# Patient Record
Sex: FEMALE | Race: WHITE | NOT HISPANIC OR LATINO | URBAN - NONMETROPOLITAN AREA
[De-identification: names, ages, dates, MRNs, and addresses within clinical notes are randomized per-mention and may not be internally consistent; named-entity substitution may affect disease eponyms.]

---

## 2019-03-14 ENCOUNTER — IMPORTED ENCOUNTER (OUTPATIENT)
Dept: URBAN - NONMETROPOLITAN AREA CLINIC 1 | Facility: CLINIC | Age: 64
End: 2019-03-14

## 2019-03-14 PROCEDURE — 92004 COMPRE OPH EXAM NEW PT 1/>: CPT

## 2019-03-14 PROCEDURE — 92015 DETERMINE REFRACTIVE STATE: CPT

## 2019-03-14 NOTE — PATIENT DISCUSSION
Compound Hyperopic Astigmatism OU w/Presbyopia-  discussed findings w/patient-  new spectacle Rx issued-  continue to monitor yearly or prnCataracts OU-  discussed findings w/patient-  no treatment indicated at this time-  UV protection recommended-  continue to monitor yearly or prnFuch's Corneal Dystrophy OU-  discussed findings w/patient-  start Rhett 128 gtts QID OU -  continue to monitorPapilloma x 2 OD-  discussed findings w/patient-  patient requests evaluation for removal at this time-  continue to monitor as per Alcus Manisha

## 2019-04-08 PROBLEM — H52.223: Noted: 2019-04-08

## 2019-04-08 PROBLEM — H02.821: Noted: 2019-04-08

## 2019-04-08 PROBLEM — H52.03: Noted: 2019-04-08

## 2019-04-08 PROBLEM — H02.822: Noted: 2019-04-08

## 2019-04-08 PROBLEM — H18.51: Noted: 2019-04-08

## 2019-04-08 PROBLEM — H52.4: Noted: 2019-04-08

## 2019-04-08 PROBLEM — H25.13: Noted: 2019-04-08

## 2019-04-09 ENCOUNTER — IMPORTED ENCOUNTER (OUTPATIENT)
Dept: URBAN - NONMETROPOLITAN AREA CLINIC 1 | Facility: CLINIC | Age: 64
End: 2019-04-09

## 2019-04-09 PROBLEM — H52.223: Noted: 2019-04-09

## 2019-04-09 PROBLEM — H25.13: Noted: 2019-04-09

## 2019-04-09 PROBLEM — H02.822: Noted: 2019-04-09

## 2019-04-09 PROBLEM — H52.4: Noted: 2019-04-09

## 2019-04-09 PROBLEM — H18.51: Noted: 2019-04-09

## 2019-04-09 PROBLEM — H02.821: Noted: 2019-04-09

## 2019-04-09 PROBLEM — H52.03: Noted: 2019-04-09

## 2019-04-09 PROCEDURE — 99213 OFFICE O/P EST LOW 20 MIN: CPT

## 2019-04-09 NOTE — PATIENT DISCUSSION
Fuch's Corneal Dystrophy OU-  discussed findings w/patient-  patient complains of his eyes aching since she started the drops and ointment will have the patient d/c drops and jessa for 2 weeks then recheck her Rx at next appointment -  d/c Rhett 128 gtts -  d/c Rhett 128 jessa -  start Systane Complete BID OU samples -  RTC 2 week f/u w/ Refraction Cataracts OU-  discussed findings w/patient-  no treatment indicated at this time-  UV protection recommended-  continue to monitor yearly or prnPapilloma x 2 OD-  discussed findings w/patient-  patient requests evaluation for removal at this time-  continue to monitor as per JSCompound Hyperopic Astigmatism OU w/Presbyopia-  discussed findings w/patient-  will recheck Rx at next appointment -  monitor 2 week f/u w/ Refraction

## 2019-04-23 ENCOUNTER — IMPORTED ENCOUNTER (OUTPATIENT)
Dept: URBAN - NONMETROPOLITAN AREA CLINIC 1 | Facility: CLINIC | Age: 64
End: 2019-04-23

## 2019-04-23 PROCEDURE — 99213 OFFICE O/P EST LOW 20 MIN: CPT

## 2019-04-23 NOTE — PATIENT DISCUSSION
Fuch's Corneal Dystrophy OU-  discussed findings w/patient-  patient has no pain-  d/c Rhett 128 gtts -  d/c Rhett 128 jessa -  continue Systane Complete BID OU samples -  start Freshkote tid-  RTC 2-3  week f/u FuchsCataracts OU-  discussed findings w/patient-  no treatment indicated at this time-  UV protection recommended-  continue to monitor yearly or prnPapilloma x 2 OD-  discussed findings w/patient-  patient requests evaluation for removal at this time-  continue to monitor as per Pita [facility]

## 2019-04-29 ENCOUNTER — IMPORTED ENCOUNTER (OUTPATIENT)
Dept: URBAN - NONMETROPOLITAN AREA CLINIC 1 | Facility: CLINIC | Age: 64
End: 2019-04-29

## 2019-04-29 PROBLEM — H52.4: Noted: 2019-04-29

## 2019-04-29 PROBLEM — H25.13: Noted: 2019-04-29

## 2019-04-29 PROBLEM — H52.223: Noted: 2019-04-29

## 2019-04-29 PROBLEM — H52.03: Noted: 2019-04-29

## 2019-04-29 PROBLEM — L82.0: Noted: 2019-04-29

## 2019-04-29 PROBLEM — H18.51: Noted: 2019-04-29

## 2019-04-29 PROCEDURE — 99212 OFFICE O/P EST SF 10 MIN: CPT

## 2019-04-29 NOTE — PATIENT DISCUSSION
Excision of Keratosis: -Excision of a lid keratosis  involves the surgical removal of part or all of a lesion. Risk include infection inflammation bleeding tightness around the suture and the need for more surgery. I have discussed the above procedure possible alternative methods of treatment and risk factors involved with the patient and/or family members. The patient wishes to proceed with excision of lid keratosis x 2 on right upper lid and 3rd keratosis on right lower lid margin. Continue to follow up w/ Dr. Sammi Mota for treatment of fuchs dystrophy.

## 2019-05-23 ENCOUNTER — IMPORTED ENCOUNTER (OUTPATIENT)
Dept: URBAN - NONMETROPOLITAN AREA CLINIC 1 | Facility: CLINIC | Age: 64
End: 2019-05-23

## 2019-05-23 PROBLEM — H52.4: Noted: 2019-05-23

## 2019-05-23 PROBLEM — H18.51: Noted: 2019-05-23

## 2019-05-23 PROBLEM — H52.223: Noted: 2019-05-23

## 2019-05-23 PROBLEM — H25.13: Noted: 2019-05-23

## 2019-05-23 PROBLEM — H52.03: Noted: 2019-05-23

## 2019-05-23 PROCEDURE — 92015 DETERMINE REFRACTIVE STATE: CPT

## 2019-05-23 PROCEDURE — 99213 OFFICE O/P EST LOW 20 MIN: CPT

## 2019-05-23 NOTE — PATIENT DISCUSSION
Fuch's Dystrophy-  discussed findings w/patient-  continue Fresh Kote QID OU-  continue Rhett 128 5% QHS OU-  monitor as scheduled or prnCompound Hyperopic Astigmatism OU w/Presbyopia-  discussed findings w/patient-  new spectacle Rx issued-  monitor yearly or prn

## 2019-07-22 ENCOUNTER — IMPORTED ENCOUNTER (OUTPATIENT)
Dept: URBAN - NONMETROPOLITAN AREA CLINIC 1 | Facility: CLINIC | Age: 64
End: 2019-07-22

## 2019-07-22 PROCEDURE — 67840 REMOVE EYELID LESION: CPT

## 2019-07-22 NOTE — PATIENT DISCUSSION
Keratosis x 1 RUL w/ second keratosis x 1 RLL. R & B's discussed . Pt elects to have procedure today. 1 week suture removal w/ Dr. Mando Hwang.

## 2019-07-23 PROBLEM — H52.223: Noted: 2019-07-23

## 2019-07-23 PROBLEM — H25.13: Noted: 2019-07-23

## 2019-07-23 PROBLEM — H52.4: Noted: 2019-07-23

## 2019-07-23 PROBLEM — H18.51: Noted: 2019-07-23

## 2019-07-23 PROBLEM — H52.03: Noted: 2019-07-23

## 2019-07-23 PROBLEM — L82.0: Noted: 2019-07-23

## 2019-07-30 ENCOUNTER — IMPORTED ENCOUNTER (OUTPATIENT)
Dept: URBAN - NONMETROPOLITAN AREA CLINIC 1 | Facility: CLINIC | Age: 64
End: 2019-07-30

## 2019-07-30 PROBLEM — H52.03: Noted: 2019-07-23

## 2019-07-30 PROBLEM — H52.223: Noted: 2019-07-23

## 2019-07-30 PROBLEM — H18.51: Noted: 2019-07-23

## 2019-07-30 PROBLEM — H52.4: Noted: 2019-07-23

## 2019-07-30 PROBLEM — H25.13: Noted: 2019-07-23

## 2019-07-30 PROBLEM — Z48.89: Noted: 2019-07-30

## 2019-07-30 PROCEDURE — 99024 POSTOP FOLLOW-UP VISIT: CPT

## 2019-07-30 NOTE — PATIENT DISCUSSION
s/p Keratosis x 1 RUL w/ second keratosis x 1 RLL-  discussed findings w/patient-  sutures not found on clincial exam-  d/c Erythromycin jessa-  continue Freshkote samples issued -  monitor in March for Routine

## 2021-03-22 LAB — MAMMOGRAPHY, EXTERNAL: NORMAL

## 2021-06-25 ENCOUNTER — APPOINTMENT (OUTPATIENT)
Dept: CT IMAGING | Age: 66
End: 2021-06-25
Attending: STUDENT IN AN ORGANIZED HEALTH CARE EDUCATION/TRAINING PROGRAM
Payer: COMMERCIAL

## 2021-06-25 ENCOUNTER — HOSPITAL ENCOUNTER (EMERGENCY)
Age: 66
Discharge: HOME | End: 2021-06-25
Attending: EMERGENCY MEDICINE
Payer: COMMERCIAL

## 2021-06-25 VITALS
RESPIRATION RATE: 16 BRPM | DIASTOLIC BLOOD PRESSURE: 83 MMHG | TEMPERATURE: 96.8 F | SYSTOLIC BLOOD PRESSURE: 174 MMHG | OXYGEN SATURATION: 96 % | WEIGHT: 230 LBS | BODY MASS INDEX: 34.07 KG/M2 | HEART RATE: 87 BPM | HEIGHT: 69 IN

## 2021-06-25 DIAGNOSIS — M54.16 LUMBAR RADICULOPATHY: Primary | ICD-10-CM

## 2021-06-25 LAB
ALBUMIN SERPL-MCNC: 3.9 G/DL (ref 3.5–5)
ALBUMIN/GLOB SERPL: 1.4 {RATIO} (ref 1.1–2.2)
ALP SERPL-CCNC: 125 U/L (ref 45–117)
ALT SERPL-CCNC: 20 U/L (ref 12–78)
ANION GAP SERPL CALC-SCNC: 5 MMOL/L (ref 5–15)
APPEARANCE UR: CLEAR
AST SERPL-CCNC: 10 U/L (ref 15–37)
BACTERIA URNS QL MICRO: NEGATIVE /HPF
BASOPHILS # BLD: 0 K/UL (ref 0–0.1)
BASOPHILS NFR BLD: 0 % (ref 0–1)
BILIRUB SERPL-MCNC: 0.3 MG/DL (ref 0.2–1)
BILIRUB UR QL: NEGATIVE
BUN SERPL-MCNC: 17 MG/DL (ref 6–20)
BUN/CREAT SERPL: 20 (ref 12–20)
CALCIUM SERPL-MCNC: 9.1 MG/DL (ref 8.5–10.1)
CHLORIDE SERPL-SCNC: 105 MMOL/L (ref 97–108)
CO2 SERPL-SCNC: 33 MMOL/L (ref 21–32)
COLOR UR: ABNORMAL
CREAT SERPL-MCNC: 0.85 MG/DL (ref 0.55–1.02)
DIFFERENTIAL METHOD BLD: NORMAL
EOSINOPHIL # BLD: 0.1 K/UL (ref 0–0.4)
EOSINOPHIL NFR BLD: 2 % (ref 0–7)
EPITH CASTS URNS QL MICRO: ABNORMAL /LPF
ERYTHROCYTE [DISTWIDTH] IN BLOOD BY AUTOMATED COUNT: 12.2 % (ref 11.5–14.5)
GLOBULIN SER CALC-MCNC: 2.8 G/DL (ref 2–4)
GLUCOSE SERPL-MCNC: 99 MG/DL (ref 65–100)
GLUCOSE UR STRIP.AUTO-MCNC: NEGATIVE MG/DL
HCT VFR BLD AUTO: 40.3 % (ref 35–47)
HGB BLD-MCNC: 13.8 G/DL (ref 11.5–16)
HGB UR QL STRIP: NEGATIVE
HYALINE CASTS URNS QL MICRO: ABNORMAL /LPF (ref 0–5)
IMM GRANULOCYTES # BLD AUTO: 0 K/UL (ref 0–0.04)
IMM GRANULOCYTES NFR BLD AUTO: 0 % (ref 0–0.5)
KETONES UR QL STRIP.AUTO: ABNORMAL MG/DL
LEUKOCYTE ESTERASE UR QL STRIP.AUTO: NEGATIVE
LYMPHOCYTES # BLD: 1.4 K/UL (ref 0.8–3.5)
LYMPHOCYTES NFR BLD: 25 % (ref 12–49)
MCH RBC QN AUTO: 32.5 PG (ref 26–34)
MCHC RBC AUTO-ENTMCNC: 34.2 G/DL (ref 30–36.5)
MCV RBC AUTO: 95 FL (ref 80–99)
MONOCYTES # BLD: 0.7 K/UL (ref 0–1)
MONOCYTES NFR BLD: 13 % (ref 5–13)
NEUTS SEG # BLD: 3.4 K/UL (ref 1.8–8)
NEUTS SEG NFR BLD: 60 % (ref 32–75)
NITRITE UR QL STRIP.AUTO: NEGATIVE
NRBC # BLD: 0 K/UL (ref 0–0.01)
NRBC BLD-RTO: 0 PER 100 WBC
PH UR STRIP: 5 [PH] (ref 5–8)
PLATELET # BLD AUTO: 206 K/UL (ref 150–400)
PMV BLD AUTO: 10 FL (ref 8.9–12.9)
POTASSIUM SERPL-SCNC: 3.2 MMOL/L (ref 3.5–5.1)
PROT SERPL-MCNC: 6.7 G/DL (ref 6.4–8.2)
PROT UR STRIP-MCNC: NEGATIVE MG/DL
RBC # BLD AUTO: 4.24 M/UL (ref 3.8–5.2)
RBC #/AREA URNS HPF: ABNORMAL /HPF (ref 0–5)
SODIUM SERPL-SCNC: 143 MMOL/L (ref 136–145)
SP GR UR REFRACTOMETRY: 1.03 (ref 1–1.03)
UR CULT HOLD, URHOLD: NORMAL
UROBILINOGEN UR QL STRIP.AUTO: 0.2 EU/DL (ref 0.2–1)
WBC # BLD AUTO: 5.7 K/UL (ref 3.6–11)
WBC URNS QL MICRO: ABNORMAL /HPF (ref 0–4)

## 2021-06-25 PROCEDURE — 80053 COMPREHEN METABOLIC PANEL: CPT

## 2021-06-25 PROCEDURE — 81001 URINALYSIS AUTO W/SCOPE: CPT

## 2021-06-25 PROCEDURE — 99283 EMERGENCY DEPT VISIT LOW MDM: CPT

## 2021-06-25 PROCEDURE — 99281 EMR DPT VST MAYX REQ PHY/QHP: CPT

## 2021-06-25 PROCEDURE — 74177 CT ABD & PELVIS W/CONTRAST: CPT

## 2021-06-25 PROCEDURE — 74011000636 HC RX REV CODE- 636: Performed by: RADIOLOGY

## 2021-06-25 PROCEDURE — 85025 COMPLETE CBC W/AUTO DIFF WBC: CPT

## 2021-06-25 PROCEDURE — 36415 COLL VENOUS BLD VENIPUNCTURE: CPT

## 2021-06-25 RX ORDER — HYDROCODONE BITARTRATE AND ACETAMINOPHEN 5; 325 MG/1; MG/1
1 TABLET ORAL
Qty: 6 TABLET | Refills: 0 | Status: SHIPPED | OUTPATIENT
Start: 2021-06-25 | End: 2021-06-28

## 2021-06-25 RX ORDER — PREDNISONE 20 MG/1
TABLET ORAL
Qty: 20 TABLET | Refills: 0 | Status: SHIPPED | OUTPATIENT
Start: 2021-06-25 | End: 2022-02-18 | Stop reason: ALTCHOICE

## 2021-06-25 RX ADMIN — IOPAMIDOL 100 ML: 755 INJECTION, SOLUTION INTRAVENOUS at 17:23

## 2021-06-25 NOTE — ED PROVIDER NOTES
67-year-old female presents with left back pain that radiates to her left leg. She has had numbness and tingling in her left toes. She was recently treated for UTI with Keflex. She has been on nitrofurantoin for suppression for the past 2 years. She denies any saddle anesthesia. She denies any fevers or chills. She does state that she is been having a hard time urinating. She denies any bowel incontinence. Flank Pain          No past medical history on file. No past surgical history on file. No family history on file. Social History     Socioeconomic History    Marital status:      Spouse name: Not on file    Number of children: Not on file    Years of education: Not on file    Highest education level: Not on file   Occupational History    Not on file   Tobacco Use    Smoking status: Not on file   Substance and Sexual Activity    Alcohol use: Not on file    Drug use: Not on file    Sexual activity: Not on file   Other Topics Concern    Not on file   Social History Narrative    Not on file     Social Determinants of Health     Financial Resource Strain:     Difficulty of Paying Living Expenses:    Food Insecurity:     Worried About Running Out of Food in the Last Year:     [address] in the Last Year:    Transportation Needs:     Lack of Transportation (Medical):  Lack of Transportation (Non-Medical):    Physical Activity:     Days of Exercise per Week:     Minutes of Exercise per Session:    Stress:     Feeling of Stress :    Social Connections:     Frequency of Communication with Friends and Family:     Frequency of Social Gatherings with Friends and Family:     Attends Buddhist Services:     Active Member of Clubs or Organizations:     Attends Club or Organization Meetings:     Marital Status:    Intimate Partner Violence:     Fear of Current or Ex-Partner:     Emotionally Abused:     Physically Abused:     Sexually Abused:           ALLERGIES: Patient has no known allergies. Review of Systems   Genitourinary: Positive for flank pain. All other systems reviewed and are negative. Vitals:    06/25/21 1512   BP: (!) 174/83   Pulse: 87   Resp: 16   Temp: 96.8 °F (36 °C)   SpO2: 96%   Weight: 104.3 kg (230 lb)   Height: 5' 9\" (1.753 m)            Physical Exam  Vitals and nursing note reviewed. Constitutional:       General: She is not in acute distress. HENT:      Head: Normocephalic and atraumatic. Mouth/Throat:      Mouth: Mucous membranes are moist.   Eyes:      General: No scleral icterus. Conjunctiva/sclera: Conjunctivae normal.      Pupils: Pupils are equal, round, and reactive to light. Neck:      Trachea: No tracheal deviation. Cardiovascular:      Rate and Rhythm: Normal rate and regular rhythm. Pulmonary:      Effort: Pulmonary effort is normal. No respiratory distress. Breath sounds: No wheezing or rales. Abdominal:      General: There is no distension. Palpations: Abdomen is soft. Tenderness: There is no abdominal tenderness. Genitourinary:     Comments: deferred  Musculoskeletal:         General: Tenderness (Left lumbar musculature) present. No deformity. Cervical back: Neck supple. Skin:     General: Skin is warm and dry. Neurological:      General: No focal deficit present. Mental Status: She is alert. Sensory: No sensory deficit. Motor: No weakness. Comments: Normal gait   Psychiatric:         Mood and Affect: Mood normal.          MDM  Number of Diagnoses or Management Options  Lumbar radiculopathy  Diagnosis management comments: Postvoid residual normal.  Patient likely suffering from herniated disc versus spinal stenosis. Severe disease on CT. Refer to Ortho. Procedures      7:47 PM  Patient re-evaluated. All questions answered. Patient appropriate for discharge. Given return precautions and follow up instructions.      LABORATORY TESTS:  Labs Reviewed METABOLIC PANEL, COMPREHENSIVE - Abnormal; Notable for the following components:       Result Value    Potassium 3.2 (*)     CO2 33 (*)     AST (SGOT) 10 (*)     Alk. phosphatase 125 (*)     All other components within normal limits   URINALYSIS W/MICROSCOPIC - Abnormal; Notable for the following components:    Ketone TRACE (*)     All other components within normal limits   URINE CULTURE HOLD SAMPLE   CBC WITH AUTOMATED DIFF   SAMPLES BEING HELD       IMAGING RESULTS:  CT ABD PELV W CONT   Final Result   No evidence for hydronephrosis or pyelonephritis or abscess   Incidental hepatic steatosis   Severe degenerative disc disease and osteoarthritis as described          MEDICATIONS GIVEN:  Medications   iopamidoL (ISOVUE-370) 76 % injection 100 mL (100 mL IntraVENous Given 6/25/21 8853)       IMPRESSION:  1. Lumbar radiculopathy        PLAN:  1. Current Discharge Medication List      START taking these medications    Details   predniSONE (DELTASONE) 20 mg tablet Take 3 tablets once daily for 3 days, Take 2 tablets once daily for 3 days, Take 1 tablets once daily for 3 days, Take 1/2 tablet once daily for 3 days  Qty: 20 Tablet, Refills: 0  Start date: 6/25/2021           2. Follow-up Information     Follow up With Specialties Details Why Contact Info    Thomas Upton MD Orthopedic Surgery Schedule an appointment as soon as possible for a visit   [id number] [facility] [phone]      [facility] EMERGENCY DEPT Emergency Medicine  If symptoms worsen or new concerns [address] [place] [address]  [phone]        3. Return to ED for new or worsening symptoms       Emerald Manzo MD

## 2021-06-25 NOTE — ED TRIAGE NOTES
Pt arrives with the c.c of left flank pain for the last couple weeks pt [facility] with [facility]i and started on abx treatment took seven day course; pt reports still having left side flank pain that radiates to grown and left leg.

## 2022-02-17 NOTE — PROGRESS NOTES
[address] [facility], [place] [address]   Office [phone], Fax [phone]    Subjective:     Chief Complaint   Patient presents with   [address]     Patient is coming in to establish care. Needs pre-op for hip surgery, left - March 22nd, 2022. Dr. Ted [facility]es at [facility] in [place], [address] [facility] (Fax: [phone]). Patient last PCP in [place], [place] last physical 2 years ago. History provided by patient     Brown Severance is a 73 y.o. female with PMHx epilepsy, recurrent UTIs, endometriosis, OA presents to establish care. Previous PCP was in [place] and last physical was about 2 years ago. Her only concerns today LUE edema for years (worsening recently) and facial diaphoresis for many years. Of note she is establishing care because she needs a pre-op visit in the future for L hip arthroplasty. She is also seeing a cardiologist for clearance because she was told she had an \"irregular EKG\" and a \"slight murmur\". They have ordered an echo, which will be done next week. Epilepsy  - Follows with Dr. Salena Catherine, neurologist at [place] [address] with lamictal 100mg tid, lyrica 75mg tid   - Has focal seizures about once per month  - Had laser ablation surgery to R hippocampus about 5 years ago- which made seizures worse but since following with Dr. Salena Catherine has made medication adjustments and overall is doing better     Recurrent UTIs  - On prophylactic macrobid       Medication reviewed. Current Outpatient Medications:     lamoTRIgine (LaMICtal) 100 mg tablet, TAKE 3 TABLETS BY MOUTH DAILY. FINAL DOSE: 1AM, 2PM, Disp: , Rfl:     pregabalin (LYRICA) 75 mg capsule, TAKE 3 CAPSULES BY MOUTH DAILY. 2 IN MORNING, 1 IN EVENING, Disp: , Rfl:     nitrofurantoin, macrocrystal-monohydrate, (MACROBID) 100 mg capsule, Take 100 mg by mouth daily. , Disp: , Rfl:     meloxicam (MOBIC) 15 mg tablet, Take 15 mg by mouth daily. , Disp: , Rfl:     hydroCHLOROthiazide (HYDRODIURIL) 25 mg tablet, , Disp: , Rfl:    aspirin (ASPIRIN) 325 mg tablet, Take 325 mg by mouth., Disp: , Rfl:      Allergy reviewed. No Known Allergies     Past Medical History:   Diagnosis Date    Arthritis     Seizure ([address]        Social History     Socioeconomic History    Marital status:    Tobacco Use    Smoking status: Former Smoker     Packs/day: 2.00     Years: 30.00     Pack years: 60.00     Quit date: 2012     Years since quitting: 10.1    Smokeless tobacco: Never Used   Vaping Use    Vaping Use: Never used   Substance and Sexual Activity    Alcohol use: Yes     Comment: occ    Drug use: Not Currently    Sexual activity: Yes     Review of Systems   Constitutional: Negative for chills and fever. HENT: Negative for congestion and sore throat. Respiratory: Negative for cough. Cardiovascular: Negative for chest pain, palpitations and leg swelling. Gastrointestinal: Negative for abdominal pain, nausea and vomiting. Genitourinary: Negative for dysuria, frequency and urgency. Skin: Negative for rash. Neurological: Negative for dizziness and headaches. Psychiatric/Behavioral: Negative for depression. The patient is not nervous/anxious. Objective:   Vitals - reviewed  Visit Vitals  /75 (BP 1 Location: Right arm, BP Patient Position: Sitting, BP Cuff Size: Large adult)   Pulse 77   Temp 98.5 °F (36.9 °C) (Oral)   Resp 20   Ht 5' 9\" (1.753 m)   Wt 218 lb (98.9 kg)   SpO2 95%   BMI 32.19 kg/m²        Physical exam:   GEN: NAD. Alert. Well nourished. EYES:  Conjunctiva clear  NECK:  Supple; no masses; thyroid normal           LUNGS: Respirations unlabored; CTAB. no wheeze, rales, rhonchi   CARDIOVASCULAR: Regular, rate, and rhythm without murmurs, gallops or rubs   NEUROLOGIC:  No focal neurologic deficits. Strength and sensation grossly intact. MSK: FROM in all extremities (both passive and active). Good tone. No vertebral tenderness. EXT: Well perfused. LUE edematous compared to RUE. No erythema. PSYCH: appropriate mood and affect. Good insight and judgement. Cooperative. SKIN: No obvious rashes or lesions. Assessment and orders:       ICD-10-CM ICD-9-CM    1. Encounter to establish care  Z76.89 V65.8    2. Edema of left upper extremity  R60.0 782.3 DUPLEX UPPER EXT VENOUS LEFT   3. Encounter for lipid screening for cardiovascular disease  Z13.220 V77.91 LIPID PANEL    Z13.6 V81.2 LIPID PANEL   4. Obesity (BMI 30-39. 9)  E66.9 278.00 HEMOGLOBIN A1C WITH EAG      METABOLIC PANEL, BASIC      REFERRAL TO DIETITIAN      METABOLIC PANEL, BASIC      HEMOGLOBIN A1C WITH EAG   5. Benign hypertension  I77 349.5 METABOLIC PANEL, BASIC      METABOLIC PANEL, BASIC   6. Diaphoresis  R61 780.8 TSH 3RD GENERATION      TSH 3RD GENERATION   7. Chronic fatigue  R53.82 780.79 CBC W/O DIFF      CBC W/O DIFF   8. EKG abnormality  R94.31 794.31    9. Seizures ([address]  R56.9 780.39        Encounter to establish care  - This provider to be patients PCP  - Release of records signed   - Labs as above  - Mychart set up    LUE edema  - Duplex    Diaphoresis: unclear etiology, had hysterectomy 30 years ago so unlikely hot flashes 2/2 menopausal state  - TSH    Seizures  - Follow up with neurology    EKG abnormality  - Follow up with cards      Follow up in 2 weeks for preop for L hip arthroplasty    Pt was discussed with Dr Deidre Aguiar (attending physician). I have reviewed patient medical and social history and medications. I have reviewed pertinent labs results and other data. I have discussed the diagnosis with the patient and the intended plan as seen in the above orders. The patient has received an after-visit summary and questions were answered concerning future plans. I have discussed medication side effects and warnings with the patient as well.     Annalee Montoya,   Resident [facility]  02/18/22

## 2022-02-18 ENCOUNTER — OFFICE VISIT (OUTPATIENT)
Dept: FAMILY MEDICINE CLINIC | Age: 67
End: 2022-02-18
Payer: COMMERCIAL

## 2022-02-18 VITALS
RESPIRATION RATE: 20 BRPM | DIASTOLIC BLOOD PRESSURE: 75 MMHG | HEART RATE: 77 BPM | TEMPERATURE: 98.5 F | HEIGHT: 69 IN | BODY MASS INDEX: 32.29 KG/M2 | OXYGEN SATURATION: 95 % | SYSTOLIC BLOOD PRESSURE: 111 MMHG | WEIGHT: 218 LBS

## 2022-02-18 DIAGNOSIS — R56.9 SEIZURES (HCC): ICD-10-CM

## 2022-02-18 DIAGNOSIS — R94.31 EKG ABNORMALITY: ICD-10-CM

## 2022-02-18 DIAGNOSIS — R61 DIAPHORESIS: ICD-10-CM

## 2022-02-18 DIAGNOSIS — Z13.6 ENCOUNTER FOR LIPID SCREENING FOR CARDIOVASCULAR DISEASE: ICD-10-CM

## 2022-02-18 DIAGNOSIS — R53.82 CHRONIC FATIGUE: ICD-10-CM

## 2022-02-18 DIAGNOSIS — Z76.89 ENCOUNTER TO ESTABLISH CARE: Primary | ICD-10-CM

## 2022-02-18 DIAGNOSIS — Z13.220 ENCOUNTER FOR LIPID SCREENING FOR CARDIOVASCULAR DISEASE: ICD-10-CM

## 2022-02-18 DIAGNOSIS — R60.0 EDEMA OF LEFT UPPER EXTREMITY: ICD-10-CM

## 2022-02-18 DIAGNOSIS — I10 BENIGN HYPERTENSION: ICD-10-CM

## 2022-02-18 DIAGNOSIS — E66.9 OBESITY (BMI 30-39.9): ICD-10-CM

## 2022-02-18 PROBLEM — G40.109 FOCAL EPILEPSY (HCC): Status: ACTIVE | Noted: 2021-08-10

## 2022-02-18 PROBLEM — N80.9 ENDOMETRIOSIS: Status: ACTIVE | Noted: 2022-02-18

## 2022-02-18 PROBLEM — E78.5 HYPERLIPIDEMIA: Status: ACTIVE | Noted: 2021-08-10

## 2022-02-18 LAB
ANION GAP SERPL CALC-SCNC: 6 MMOL/L (ref 5–15)
BUN SERPL-MCNC: 23 MG/DL (ref 6–20)
BUN/CREAT SERPL: 34 (ref 12–20)
CALCIUM SERPL-MCNC: 10.1 MG/DL (ref 8.5–10.1)
CHLORIDE SERPL-SCNC: 103 MMOL/L (ref 97–108)
CHOLEST SERPL-MCNC: 224 MG/DL
CO2 SERPL-SCNC: 31 MMOL/L (ref 21–32)
CREAT SERPL-MCNC: 0.67 MG/DL (ref 0.55–1.02)
ERYTHROCYTE [DISTWIDTH] IN BLOOD BY AUTOMATED COUNT: 12.7 % (ref 11.5–14.5)
EST. AVERAGE GLUCOSE BLD GHB EST-MCNC: 103 MG/DL
GLUCOSE SERPL-MCNC: 112 MG/DL (ref 65–100)
HBA1C MFR BLD: 5.2 % (ref 4–5.6)
HCT VFR BLD AUTO: 45 % (ref 35–47)
HDLC SERPL-MCNC: 61 MG/DL
HDLC SERPL: 3.7 {RATIO} (ref 0–5)
HGB BLD-MCNC: 15.1 G/DL (ref 11.5–16)
LDLC SERPL CALC-MCNC: 123.8 MG/DL (ref 0–100)
MCH RBC QN AUTO: 30.6 PG (ref 26–34)
MCHC RBC AUTO-ENTMCNC: 33.6 G/DL (ref 30–36.5)
MCV RBC AUTO: 91.3 FL (ref 80–99)
NRBC # BLD: 0 K/UL (ref 0–0.01)
NRBC BLD-RTO: 0 PER 100 WBC
PLATELET # BLD AUTO: 222 K/UL (ref 150–400)
PMV BLD AUTO: 10.5 FL (ref 8.9–12.9)
POTASSIUM SERPL-SCNC: 3.5 MMOL/L (ref 3.5–5.1)
RBC # BLD AUTO: 4.93 M/UL (ref 3.8–5.2)
SODIUM SERPL-SCNC: 140 MMOL/L (ref 136–145)
TRIGL SERPL-MCNC: 196 MG/DL (ref ?–150)
TSH SERPL DL<=0.05 MIU/L-ACNC: 1.16 UIU/ML (ref 0.36–3.74)
VLDLC SERPL CALC-MCNC: 39.2 MG/DL
WBC # BLD AUTO: 5.2 K/UL (ref 3.6–11)

## 2022-02-18 PROCEDURE — 99203 OFFICE O/P NEW LOW 30 MIN: CPT | Performed by: STUDENT IN AN ORGANIZED HEALTH CARE EDUCATION/TRAINING PROGRAM

## 2022-02-18 RX ORDER — MELOXICAM 15 MG/1
15 TABLET ORAL DAILY
COMMUNITY
Start: 2021-12-15

## 2022-02-18 RX ORDER — HYDROCHLOROTHIAZIDE 25 MG/1
50 TABLET ORAL 2 TIMES DAILY
COMMUNITY
Start: 2022-02-16

## 2022-02-18 RX ORDER — NITROFURANTOIN 25; 75 MG/1; MG/1
100 CAPSULE ORAL DAILY
COMMUNITY
Start: 2022-02-02

## 2022-02-18 RX ORDER — PREGABALIN 75 MG/1
CAPSULE ORAL
COMMUNITY
Start: 2022-01-06

## 2022-02-18 RX ORDER — LAMOTRIGINE 100 MG/1
TABLET ORAL
COMMUNITY
Start: 2022-02-01

## 2022-02-18 RX ORDER — ASPIRIN 325 MG
325 TABLET ORAL
COMMUNITY

## 2022-02-18 NOTE — PROGRESS NOTES
Identified Patient with two Patient identifiers (Name and ). Two Patient Identifiers confirmed. Reviewed record in preparation for visit and have obtained necessary documentation. Chief Complaint   Patient presents with   [address]     Patient is coming in to establish care. Needs pre-op for hip surgery, left - 2022. Dr. Michelle Parson at [facility] in [place], [address] [facility] (Fax: [phone]). Patient last PCP in [place], [place] last physical 2 years ago. Visit Vitals  /75 (BP 1 Location: Right arm, BP Patient Position: Sitting, BP Cuff Size: Large adult)   Pulse 77   Temp 98.5 °F (36.9 °C) (Oral)   Resp 20   Ht 5' 9\" (1.753 m)   Wt 218 lb (98.9 kg)   SpO2 95%   BMI 32.19 kg/m²       1. Have you been to the ER, urgent care clinic since your last visit? Hospitalized since your last visit? No NEW PATIENT    2. Have you seen or consulted any other health care providers outside of the [address] [place] [address] since your last visit? Include any pap smears or colon screening.  No NEW PATIENT

## 2022-02-18 NOTE — PATIENT INSTRUCTIONS
Learning About the [facility] Diet  What is the Mediterranean diet? The Mediterranean diet is a style of eating rather than a diet plan. It features foods eaten in [place], [place], [place] and [place], and other countries along the [facility]. It emphasizes eating foods like fish, fruits, vegetables, beans, high-fiber breads and whole grains, nuts, and olive oil. This style of eating includes limited red meat, cheese, and sweets. Why choose the Mediterranean diet? A Mediterranean-style diet may improve heart health. It contains more fat than other heart-healthy diets. But the fats are mainly from nuts, unsaturated oils (such as fish oils and olive oil), and certain nut or seed oils (such as canola, soybean, or flaxseed oil). These fats may help protect the heart and blood vessels. How can you get started on the Mediterranean diet? Here are some things you can do to switch to a more Mediterranean way of eating. What to eat  · Eat a variety of fruits and vegetables each day, such as grapes, blueberries, tomatoes, broccoli, peppers, figs, olives, spinach, eggplant, beans, lentils, and chickpeas. · Eat a variety of whole-grain foods each day, such as oats, brown rice, and whole wheat bread, pasta, and couscous. · Eat fish at least 2 times a week. Try tuna, salmon, mackerel, lake trout, herring, or sardines. · Eat moderate amounts of low-fat dairy products, such as milk, cheese, or yogurt. · Eat moderate amounts of poultry and eggs. · Choose healthy (unsaturated) fats, such as nuts, olive oil, and certain nut or seed oils like canola, soybean, and flaxseed. · Limit unhealthy (saturated) fats, such as butter, palm oil, and coconut oil. And limit fats found in animal products, such as meat and dairy products made with whole milk. Try to eat red meat only a few times a month in very small amounts. · Limit sweets and desserts to only a few times a week.  This includes sugar-sweetened drinks like soda. The Mediterranean diet may also include red wine with your meal1 glass each day for women and up to 2 glasses a day for men. Tips for eating at home  · Use herbs, spices, garlic, lemon zest, and citrus juice instead of salt to add flavor to foods. · Add avocado slices to your sandwich instead of clarke. · Have fish for lunch or dinner instead of red meat. Brush the fish with olive oil, and broil or grill it. · Sprinkle your salad with seeds or nuts instead of cheese. · Cook with olive or canola oil instead of butter or oils that are high in saturated fat. · Switch from 2% milk or whole milk to 1% or fat-free milk. · Dip raw vegetables in a vinaigrette dressing or hummus instead of dips made from mayonnaise or sour cream.  · Have a piece of fruit for dessert instead of a piece of cake. Try baked apples, or have some dried fruit. Tips for eating out  · Try broiled, grilled, baked, or poached fish instead of having it fried or breaded. · Ask your  to have your meals prepared with olive oil instead of butter. · Order dishes made with marinara sauce or sauces made from olive oil. Avoid sauces made from cream or mayonnaise. · Choose whole-grain breads, whole wheat pasta and pizza crust, brown rice, beans, and lentils. · Cut back on butter or margarine on bread. Instead, you can dip your bread in a small amount of olive oil. · Ask for a side salad or grilled vegetables instead of french fries or chips. Where can you learn more? Go to http://www.kiser.com/  Enter O407 in the search box to learn more about \"Learning About the Mediterranean Diet. \"  Current as of: December 17, 2020               Content Version: 13.0  © 0271-1886 Healthwise[employer]. Care instructions adapted under license by [employer] (which disclaims liability or warranty for this information).  If you have questions about a medical condition or this instruction, always ask your healthcare professional. [address] any warranty or liability for your use of this information.

## 2022-02-21 NOTE — PROGRESS NOTES
CBC wnl  BMP with mildly elevated glucose of 112 but A1C wnl  Lipid panel abn - ASCVD 5.7% with . Work on lifestyle modifications. TSH nl.

## 2022-02-25 ENCOUNTER — HOSPITAL ENCOUNTER (OUTPATIENT)
Dept: VASCULAR SURGERY | Age: 67
Discharge: HOME | End: 2022-02-25
Attending: STUDENT IN AN ORGANIZED HEALTH CARE EDUCATION/TRAINING PROGRAM
Payer: COMMERCIAL

## 2022-02-25 DIAGNOSIS — R60.0 EDEMA OF LEFT UPPER EXTREMITY: ICD-10-CM

## 2022-02-25 PROCEDURE — 93971 EXTREMITY STUDY: CPT

## 2022-03-01 ENCOUNTER — DOCUMENTATION ONLY (OUTPATIENT)
Dept: FAMILY MEDICINE CLINIC | Age: 67
End: 2022-03-01

## 2022-03-01 NOTE — PROGRESS NOTES
Received records from cardiologist, Dr. Sumaya Neff. He noted 2/6 ERIN L upper sternal border and ordered an echo. EKG was also attached to the note that was faxed over. EKG NSR, LAFB, LVH, poor r wave progression, no acute ST changes. Full note placed in scan pile.      Tri Reyes, [address] [place] [address] Resident

## 2022-03-09 ENCOUNTER — OFFICE VISIT (OUTPATIENT)
Dept: FAMILY MEDICINE CLINIC | Age: 67
End: 2022-03-09
Payer: COMMERCIAL

## 2022-03-09 VITALS
HEART RATE: 79 BPM | WEIGHT: 220 LBS | BODY MASS INDEX: 32.58 KG/M2 | SYSTOLIC BLOOD PRESSURE: 138 MMHG | RESPIRATION RATE: 16 BRPM | OXYGEN SATURATION: 96 % | HEIGHT: 69 IN | DIASTOLIC BLOOD PRESSURE: 68 MMHG

## 2022-03-09 DIAGNOSIS — M16.12 ARTHRITIS OF LEFT HIP: Primary | ICD-10-CM

## 2022-03-09 DIAGNOSIS — Z01.818 PRE-OP EXAM: ICD-10-CM

## 2022-03-09 DIAGNOSIS — E78.5 HYPERLIPIDEMIA, UNSPECIFIED HYPERLIPIDEMIA TYPE: ICD-10-CM

## 2022-03-09 PROBLEM — Z86.73 HISTORY OF TIAS: Status: ACTIVE | Noted: 2022-03-09

## 2022-03-09 PROCEDURE — 1090F PRES/ABSN URINE INCON ASSESS: CPT | Performed by: STUDENT IN AN ORGANIZED HEALTH CARE EDUCATION/TRAINING PROGRAM

## 2022-03-09 PROCEDURE — G8536 NO DOC ELDER MAL SCRN: HCPCS | Performed by: STUDENT IN AN ORGANIZED HEALTH CARE EDUCATION/TRAINING PROGRAM

## 2022-03-09 PROCEDURE — G9899 SCRN MAM PERF RSLTS DOC: HCPCS | Performed by: STUDENT IN AN ORGANIZED HEALTH CARE EDUCATION/TRAINING PROGRAM

## 2022-03-09 PROCEDURE — 99213 OFFICE O/P EST LOW 20 MIN: CPT | Performed by: STUDENT IN AN ORGANIZED HEALTH CARE EDUCATION/TRAINING PROGRAM

## 2022-03-09 PROCEDURE — G8432 DEP SCR NOT DOC, RNG: HCPCS | Performed by: STUDENT IN AN ORGANIZED HEALTH CARE EDUCATION/TRAINING PROGRAM

## 2022-03-09 PROCEDURE — 1101F PT FALLS ASSESS-DOCD LE1/YR: CPT | Performed by: STUDENT IN AN ORGANIZED HEALTH CARE EDUCATION/TRAINING PROGRAM

## 2022-03-09 PROCEDURE — G8427 DOCREV CUR MEDS BY ELIG CLIN: HCPCS | Performed by: STUDENT IN AN ORGANIZED HEALTH CARE EDUCATION/TRAINING PROGRAM

## 2022-03-09 PROCEDURE — G8754 DIAS BP LESS 90: HCPCS | Performed by: STUDENT IN AN ORGANIZED HEALTH CARE EDUCATION/TRAINING PROGRAM

## 2022-03-09 PROCEDURE — 3017F COLORECTAL CA SCREEN DOC REV: CPT | Performed by: STUDENT IN AN ORGANIZED HEALTH CARE EDUCATION/TRAINING PROGRAM

## 2022-03-09 PROCEDURE — G8752 SYS BP LESS 140: HCPCS | Performed by: STUDENT IN AN ORGANIZED HEALTH CARE EDUCATION/TRAINING PROGRAM

## 2022-03-09 PROCEDURE — G8400 PT W/DXA NO RESULTS DOC: HCPCS | Performed by: STUDENT IN AN ORGANIZED HEALTH CARE EDUCATION/TRAINING PROGRAM

## 2022-03-09 PROCEDURE — G8417 CALC BMI ABV UP PARAM F/U: HCPCS | Performed by: STUDENT IN AN ORGANIZED HEALTH CARE EDUCATION/TRAINING PROGRAM

## 2022-03-09 RX ORDER — ATORVASTATIN CALCIUM 40 MG/1
40 TABLET, FILM COATED ORAL DAILY
Qty: 30 TABLET | Refills: 2 | Status: SHIPPED | OUTPATIENT
Start: 2022-03-09 | End: 2022-06-07

## 2022-03-09 RX ORDER — ESTRADIOL 0.1 MG/G
CREAM VAGINAL
COMMUNITY
Start: 2021-09-01

## 2022-03-09 NOTE — PROGRESS NOTES
Lisa Jerome is an 73 y.o. female who presents for preoperative clearance. Planned procedure: L Hip replacement  Surgeon: Dr. Juan Carlos Rucker     History of injury: no injury- indication for surgery is arthritis     Risk Assessment using Revised Mera cardiac risk index (RCRI):  High-risk type of surgery (examples include vascular surgery and any open intraperitoneal or intrathoracic procedures): none  History of ischemic heart disease (i.e. MI or a positive exercise test, current complaint of chest pain, use of nitrate therapy, or ECG with pathological Q waves): none (of note- was seen by cardiology also and cleared for surgery)  History of HF: none   History of cerebrovascular disease: previously has had TIAs  Diabetes mellitus requiring treatment with insulin: none  Preoperative serum creatinine >2.0 mg/dL (177 µmol/L): none    Assessment of Cardiac Functional Status:   Can take care of self, such as eat, dress, or use the toilet (1 MET): yes  Can walk up a flight of steps or a hill or walk on level ground at 3 to 4 mph (4 METs): yes with difficulty   Can do heavy work around the house such as scrubbing floors or lifting or moving heavy furniture or climb two flights of stairs (between 4 and 10 METs): no  Can participate in strenuous sports such as swimming, singles tennis, football, basketball, and skiing (>10 METs): no    Allergies - reviewed:   No Known Allergies      Medications - reviewed:   Current Outpatient Medications   Medication Sig    mv,Ca,min-folic acid-vit K1 438-31 mcg tab Take 1 Tablet by mouth daily.  estradioL (ESTRACE) 0.01 % (0.1 mg/gram) vaginal cream APPLY A SMALL AMOUNT INTO VAGINA TWICE A WEEK    atorvastatin (LIPITOR) 40 mg tablet Take 1 Tablet by mouth daily.  lamoTRIgine (LaMICtal) 100 mg tablet TAKE 3 TABLETS BY MOUTH DAILY. FINAL DOSE: 1AM, 2PM    pregabalin (LYRICA) 75 mg capsule TAKE 3 CAPSULES BY MOUTH DAILY.  2 IN MORNING, 1 IN EVENING    nitrofurantoin, macrocrystal-monohydrate, (MACROBID) 100 mg capsule Take 100 mg by mouth daily.  meloxicam (MOBIC) 15 mg tablet Take 15 mg by mouth daily.  hydroCHLOROthiazide (HYDRODIURIL) 25 mg tablet     aspirin (ASPIRIN) 325 mg tablet Take 325 mg by mouth. No current facility-administered medications for this visit. Past Medical History - reviewed:  Past Medical History:   Diagnosis Date    Arthritis     Seizure ([facility] [address]      Past Surgical History - reviewed:   Past Surgical History:   Procedure Laterality Date    HX CHOLECYSTECTOMY      HX DENTAL TRANSPLANT      HX HYSTERECTOMY      HX ORTHOPAEDIC      knee left and right     Social History - reviewed:  Social History     Socioeconomic History    Marital status:      Spouse name: Not on file    Number of children: Not on file    Years of education: Not on file    Highest education level: Not on file   Occupational History    Not on file   Tobacco Use    Smoking status: Former Smoker     Packs/day: 2.00     Years: 30.00     Pack years: 60.00     Quit date: 2012     Years since quitting: 10.1    Smokeless tobacco: Never Used   Vaping Use    Vaping Use: Never used   Substance and Sexual Activity    Alcohol use: Yes     Comment: occ    Drug use: Not Currently    Sexual activity: Yes   Other Topics Concern    Not on file   Social History Narrative    Not on file     Social Determinants of Health     Financial Resource Strain:     Difficulty of Paying Living Expenses: Not on file   Food Insecurity:     Worried About [address] in the Last Year: Not on file    [address] in the Last Year: Not on file   Transportation Needs:     Lack of Transportation (Medical): Not on file    Lack of Transportation (Non-Medical):  Not on file   Physical Activity:     Days of Exercise per Week: Not on file    Minutes of Exercise per Session: Not on file   Stress:     Feeling of Stress : Not on file   Social Connections:     Frequency of Communication with Friends and Family: Not on file    Frequency of Social Gatherings with Friends and Family: Not on file    Attends Denominational Services: Not on file    Active Member of Clubs or Organizations: Not on file    Attends Club or Organization Meetings: Not on file    Marital Status: Not on file   Intimate Partner Violence:     Fear of Current or Ex-Partner: Not on file    Emotionally Abused: Not on file    Physically Abused: Not on file    Sexually Abused: Not on file   Housing Stability:     Unable to Pay for Housing in the Last Year: Not on file    Number of [place] in the Last Year: Not on file    Unstable Housing in the Last Year: Not on file     Family History - reviewed:  Family History   Problem Relation Age of Onset    Cancer Mother     Heart Disease Mother     Cancer Father      Immunizations - reviewed:   Immunization History   Administered Date(s) Administered    COVID-19, Rashaad Avitia, Primary or Immunocompromised Series, MRNA, PF, 100mcg/0.5mL 01/25/2022    COVID-19, Pfizer Purple top, DILUTE for use, 12+ yrs, 30mcg/0.3mL dose 04/29/2021, 05/20/2021     ROS  Review of Systems   Constitutional: Negative for chills and fever. HENT: Negative for congestion and sore throat. Respiratory: Negative for cough and shortness of breath. Cardiovascular: Negative for chest pain and palpitations. Gastrointestinal: Negative for abdominal pain, constipation, diarrhea, nausea and vomiting. Genitourinary: Negative for dysuria and urgency. Skin: Negative for rash. Neurological: Negative for dizziness and headaches. Physical Exam  Visit Vitals  /68 (BP 1 Location: Left arm, BP Patient Position: Sitting, BP Cuff Size: Adult long)   Pulse 79   Resp 16   Ht 5' 9\" (1.753 m)   Wt 220 lb (99.8 kg)   SpO2 96%   BMI 32.49 kg/m²       Physical Exam  Constitutional:       Appearance: Normal appearance. HENT:      Head: Normocephalic and atraumatic.       Mouth/Throat: Mouth: Mucous membranes are moist.      Pharynx: No oropharyngeal exudate. Eyes:      General: No scleral icterus. Conjunctiva/sclera: Conjunctivae normal.   Cardiovascular:      Rate and Rhythm: Normal rate and regular rhythm. Heart sounds: Murmur heard. Pulmonary:      Effort: Pulmonary effort is normal.      Breath sounds: Normal breath sounds. No wheezing, rhonchi or rales. Abdominal:      General: Abdomen is flat. There is no distension. Palpations: Abdomen is soft. Tenderness: There is no abdominal tenderness. There is no guarding. Skin:     General: Skin is warm and dry. Neurological:      General: No focal deficit present. Mental Status: She is alert and oriented to person, place, and time. Cranial Nerves: No cranial nerve deficit. Psychiatric:         Mood and Affect: Mood normal.         Behavior: Behavior normal.       Assessment/Plan    ICD-10-CM ICD-9-CM    1. Arthritis of left hip  M16.12 716.95    2. Pre-op exam  Z01.818 V72.84 CBC W/O DIFF      METABOLIC PANEL, BASIC   3. Hyperlipidemia, unspecified hyperlipidemia type  E78.5 272.4 atorvastatin (LIPITOR) 40 mg tablet       Miguel Dnucan is scheduled to have noncardiac surgery and has been evaluated for the risk of a cardiovascular perioperative cardiac event. female is cleared for surgery and the estimated risk of an adverse cardiac event using the Revised Mera Cardiac Risk Index (RCRI) is 6%. She has also been seen by cardiology recently due to an abnormal EKG. She has followed up with them and been cleared for surgery by them as well. They told her the EKG was not worrisome, they also did an echocardiogram for a slight murmur that they heard but she was told the results were normal. We will check a CBC, BMP today per request of preop forms and will send results with this note once they are back. We did also discuss her HLD and ASCVD risk of 8.8%.  She has previously been on a statin but was stopped due to her cholesterol improving in the past. She is agreeable to restart lipitor today. We will recheck lipid panel in 2-3 months. I have discussed the diagnosis with the patient and the intended plan as seen in the above orders. The patient has received an after-visit summary and questions were answered concerning future plans. I have discussed medication side effects and warnings with the patient as well.       Patrick Clifton, DO

## 2022-03-09 NOTE — PROGRESS NOTES
Chief Complaint   Patient presents with    Pre-op Exam     L hip replacement scheduled for 3/22/22.      Visit Vitals  /68 (BP 1 Location: Left arm, BP Patient Position: Sitting, BP Cuff Size: Adult long)   Pulse 79   Resp 16   Ht 5' 9\" (1.753 m)   Wt 220 lb (99.8 kg)   SpO2 96%   BMI 32.49 kg/m²

## 2022-03-10 LAB
ANION GAP SERPL CALC-SCNC: 3 MMOL/L (ref 5–15)
BUN SERPL-MCNC: 21 MG/DL (ref 6–20)
BUN/CREAT SERPL: 29 (ref 12–20)
CALCIUM SERPL-MCNC: 9.9 MG/DL (ref 8.5–10.1)
CHLORIDE SERPL-SCNC: 101 MMOL/L (ref 97–108)
CO2 SERPL-SCNC: 36 MMOL/L (ref 21–32)
CREAT SERPL-MCNC: 0.72 MG/DL (ref 0.55–1.02)
ERYTHROCYTE [DISTWIDTH] IN BLOOD BY AUTOMATED COUNT: 12.5 % (ref 11.5–14.5)
GLUCOSE SERPL-MCNC: 106 MG/DL (ref 65–100)
HCT VFR BLD AUTO: 44.5 % (ref 35–47)
HGB BLD-MCNC: 14.6 G/DL (ref 11.5–16)
MCH RBC QN AUTO: 30.5 PG (ref 26–34)
MCHC RBC AUTO-ENTMCNC: 32.8 G/DL (ref 30–36.5)
MCV RBC AUTO: 93.1 FL (ref 80–99)
NRBC # BLD: 0 K/UL (ref 0–0.01)
NRBC BLD-RTO: 0 PER 100 WBC
PLATELET # BLD AUTO: 221 K/UL (ref 150–400)
PMV BLD AUTO: 10.8 FL (ref 8.9–12.9)
POTASSIUM SERPL-SCNC: 3.6 MMOL/L (ref 3.5–5.1)
RBC # BLD AUTO: 4.78 M/UL (ref 3.8–5.2)
SODIUM SERPL-SCNC: 140 MMOL/L (ref 136–145)
WBC # BLD AUTO: 5 K/UL (ref 3.6–11)

## 2022-03-18 PROBLEM — I10 BENIGN HYPERTENSION: Status: ACTIVE | Noted: 2021-08-10

## 2022-03-18 PROBLEM — Z86.73 HISTORY OF TIAS: Status: ACTIVE | Noted: 2022-03-09

## 2022-03-19 PROBLEM — G40.109 FOCAL EPILEPSY (HCC): Status: ACTIVE | Noted: 2021-08-10

## 2022-03-20 PROBLEM — N80.9 ENDOMETRIOSIS: Status: ACTIVE | Noted: 2022-02-18

## 2022-03-20 PROBLEM — E78.5 HYPERLIPIDEMIA: Status: ACTIVE | Noted: 2021-08-10

## 2022-04-10 ASSESSMENT — VISUAL ACUITY
OD_SC: 20/30
OD_SC: 20/30-2
OD_SC: 20/40
OU_SC: 20/25-2
OS_SC: 20/20
OS_SC: 20/40+
OD_PH: 20/25-2
OU_SC: 20/30-2
OS_SC: 20/50-
OD_SC: 20/40-
OS_SC: 20/40
OD_SC: 20/30-1
OS_SC: 20/30-1
OU_CC: 20/30+
OS_SC: 20/30-1
OS_SC: 20/25-2
OS_GLARE: 20/400
OD_SC: 20/40-1

## 2022-04-10 ASSESSMENT — TONOMETRY
OS_IOP_MMHG: 14
OD_IOP_MMHG: 16
OS_IOP_MMHG: 14
OD_IOP_MMHG: 16
OS_IOP_MMHG: 15
OD_IOP_MMHG: 14
OS_IOP_MMHG: 12
OD_IOP_MMHG: 12

## 2022-04-21 ENCOUNTER — OFFICE VISIT (OUTPATIENT)
Dept: FAMILY MEDICINE CLINIC | Age: 67
End: 2022-04-21
Payer: COMMERCIAL

## 2022-04-21 VITALS
BODY MASS INDEX: 32.94 KG/M2 | WEIGHT: 222.4 LBS | RESPIRATION RATE: 16 BRPM | HEIGHT: 69 IN | HEART RATE: 77 BPM | DIASTOLIC BLOOD PRESSURE: 76 MMHG | SYSTOLIC BLOOD PRESSURE: 118 MMHG | OXYGEN SATURATION: 95 %

## 2022-04-21 DIAGNOSIS — Z12.31 ENCOUNTER FOR SCREENING MAMMOGRAM FOR MALIGNANT NEOPLASM OF BREAST: ICD-10-CM

## 2022-04-21 DIAGNOSIS — N95.1 HOT FLASHES DUE TO MENOPAUSE: ICD-10-CM

## 2022-04-21 DIAGNOSIS — M79.89 LEFT UPPER EXTREMITY SWELLING: Primary | ICD-10-CM

## 2022-04-21 PROBLEM — F33.0 MAJOR DEPRESSIVE DISORDER, RECURRENT, MILD (HCC): Status: ACTIVE | Noted: 2022-04-21

## 2022-04-21 PROCEDURE — 99214 OFFICE O/P EST MOD 30 MIN: CPT | Performed by: STUDENT IN AN ORGANIZED HEALTH CARE EDUCATION/TRAINING PROGRAM

## 2022-04-21 RX ORDER — VENLAFAXINE HYDROCHLORIDE 37.5 MG/1
37.5 CAPSULE, EXTENDED RELEASE ORAL DAILY
Qty: 30 CAPSULE | Refills: 1 | Status: SHIPPED | OUTPATIENT
Start: 2022-04-21 | End: 2022-06-15

## 2022-04-21 NOTE — PROGRESS NOTES
[address] [facility] [address] [place] [address]   Office [phone], Fax [phone]    Subjective:     Chief Complaint   Patient presents with    Swelling     History provided by patient     Griffin Grajeda is a 73 y.o. female presents for LUE edema and hot flashes. LUE edema  - Present for years but worsening over the last few months. No injury or previous surgeries to that shoulder or chest wall   - Had duplex that was negative for DVT previously  - No erythema or skin changes  - Last mammogram just over 1 year ago and normal per patient     Hot flashes  - Started at 62, worse in the past 2 years  - LMP in her 35s after ?partial hysterectomy. She thinks her ovaries may have been left behind but she is not 100% sure  - TSH recently wnl      Medication reviewed. Current Outpatient Medications:     venlafaxine-SR (EFFEXOR-XR) 37.5 mg capsule, Take 1 Capsule by mouth daily. , Disp: 30 Capsule, Rfl: 1    mv,Ca,min-folic acid-vit K1 860-95 mcg tab, Take 1 Tablet by mouth daily. , Disp: , Rfl:     atorvastatin (LIPITOR) 40 mg tablet, Take 1 Tablet by mouth daily. , Disp: 30 Tablet, Rfl: 2    lamoTRIgine (LaMICtal) 100 mg tablet, TAKE 3 TABLETS BY MOUTH DAILY. FINAL DOSE: 1AM, 2PM, Disp: , Rfl:     pregabalin (LYRICA) 75 mg capsule, TAKE 3 CAPSULES BY MOUTH DAILY. 2 IN MORNING, 1 IN EVENING, Disp: , Rfl:     nitrofurantoin, macrocrystal-monohydrate, (MACROBID) 100 mg capsule, Take 100 mg by mouth daily. , Disp: , Rfl:     meloxicam (MOBIC) 15 mg tablet, Take 15 mg by mouth daily. , Disp: , Rfl:     hydroCHLOROthiazide (HYDRODIURIL) 25 mg tablet, Take 50 mg by mouth two (2) times a day., Disp: , Rfl:     aspirin (ASPIRIN) 325 mg tablet, Take 325 mg by mouth., Disp: , Rfl:     estradioL (ESTRACE) 0.01 % (0.1 mg/gram) vaginal cream, APPLY A SMALL AMOUNT INTO VAGINA TWICE A WEEK, Disp: , Rfl:      Allergy reviewed.   No Known Allergies     Past Medical History:   Diagnosis Date    Arthritis     Seizure ([facility] [address] Social History     Socioeconomic History    Marital status:    Tobacco Use    Smoking status: Former Smoker     Packs/day: 2.00     Years: 30.00     Pack years: 60.00     Quit date: 2012     Years since quitting: 10.3    Smokeless tobacco: Never Used   Vaping Use    Vaping Use: Never used   Substance and Sexual Activity    Alcohol use: Yes     Comment: occ    Drug use: Not Currently    Sexual activity: Yes       Review of Systems   Constitutional: Negative for chills and fever.        + hot flashes   HENT: Negative for congestion and sore throat. Respiratory: Negative for cough and shortness of breath. Cardiovascular: Negative for chest pain, palpitations and leg swelling. Gastrointestinal: Negative for abdominal pain, diarrhea, nausea and vomiting. Musculoskeletal: Negative for myalgias. Skin: Negative for rash. Neurological: Negative for dizziness and headaches. Objective:   Vitals - reviewed  Visit Vitals  /76 (BP 1 Location: Right arm, BP Patient Position: Sitting)   Pulse 77   Resp 16   Ht 5' 9\" (1.753 m)   Wt 222 lb 6.4 oz (100.9 kg)   SpO2 95%   BMI 32.84 kg/m²        Physical exam:   GEN: NAD. Alert. Well nourished. EYES:  Conjunctiva clear  NECK:  Supple; no masses; thyroid normal           LUNGS: Respirations unlabored; CTAB. no wheeze, rales, rhonchi   CARDIOVASCULAR: Regular, rate, and rhythm. + 2/6 murmur JOSE border  ABDOMEN: Soft; NT, ND. +bowel sounds; no rebound tenderness, no guarding. no masses or organomegaly  NEUROLOGIC:  No focal neurologic deficits. Strength and sensation grossly intact. MSK: FROM in all extremities (both passive and active). Good tone. + LUE non-pitting edema  EXT: Well perfused. No edema. No erythema. PSYCH: appropriate mood and affect. Good insight and judgement. Cooperative. SKIN: No obvious rashes or lesions. Assessment and orders:       ICD-10-CM ICD-9-CM    1.  Left upper extremity swelling  M79.89 729.81 REFERRAL TO LYMPHEDEMA CLINIC   2. Hot flashes due to menopause  N95.1 627.2 venlafaxine-SR (EFFEXOR-XR) 37.5 mg capsule   3. Encounter for screening mammogram for malignant neoplasm of breast  Z12.31 V76.12 YUMIKO 3D CHUY W MAMMO BI SCREENING INCL CAD     LUE edema: Likely 2/2 lymphedema   - Compression sleeve to LUE   - Referral to lymphedema clinic  - Mammogram to rule out breast cancer as etiology. Unlikely as patient had normal mammo a little over 1 year ago     Hot flashes: TSH recently wnl, possibly 2/2 menopausal symptoms  - Effexor 37.5mg daily      Follow up in 4 weeks    Pt was discussed and seen with Dr Thao Singh (attending physician). I have reviewed patient medical and social history and medications. I have reviewed pertinent labs results and other data. I have discussed the diagnosis with the patient and the intended plan as seen in the above orders. The patient has received an after-visit summary and questions were answered concerning future plans. I have discussed medication side effects and warnings with the patient as well.     Bev Castellon,   Resident [facility]  04/21/22

## 2022-04-21 NOTE — PATIENT INSTRUCTIONS
Lymphedema: Care Instructions  Your Care Instructions     Lymphedema is fluid that builds up in the arms or legs. It is often caused by surgery to remove lymph nodes during cancer treatment, especially breast cancer surgery, which can cause fluid to build up in the arm. It can happen after radiation treatment to an area that involves lymph nodes. It also can be caused by a fractured bone or surgery to fix a fracture. And some medicines also can cause lymphedema. Some people get it for unknown reasons. Normally, lymph nodes trap bacteria and other substances as fluid flows through them. Then, the white cells in the body's defense, or immune, system can destroy the substances. But if there are few or no lymph nodesor if the lymph system in an arm or leg has been damagedfluid can build up in the affected arm or leg. You can take simple steps at home to help treat or prevent fluid buildup. Treatment may include raising the arm or leg to let gravity drain the fluid. You also can wear compression stockings or sleeves. Follow-up care is a key part of your treatment and safety. Be sure to make and go to all appointments, and call your doctor if you are having problems. It's also a good idea to know your test results and keep a list of the medicines you take. How can you care for yourself at home? · Wear a compression stocking or sleeve as your doctor suggests. It can help keep fluid from pooling in an arm or leg. Wear it during air travel. · Prop up the swollen arm or leg on a pillow anytime you sit or lie down. Try to keep it above the level of your heart. This will help reduce swelling. · Avoid crossing your legs if your legs are swollen. · Get some exercise on most days of the week. Increase the intensity of exercise slowly. Water aerobics can help reduce swelling by helping fluid move around. Wear your compression stocking or sleeve during exercise, but not during water exercise.   · See a physical therapist. He or she can teach you how to do self-massage to help fluid move around. You also can learn what activities would be best for you. · Keep your feet clean and wear clean socks or stockings every day. Check your feet often for signs of infection, such as redness or heat. Do not walk barefoot. · If you have had lymph nodes removed from under your arm:  ? Do not have blood drawn from the arm on the side of the lymph node surgery. ? Do not allow a blood pressure cuff to be placed on that arm. If you are in the hospital, make sure your nurse and other hospital staff know of your condition. ? Wear gloves when gardening or doing other activities that may lead to cuts on your fingers or hands. · If you have had lymph nodes removed from your groin:  ? Bathe your feet daily in lukewarm, not hot, water. Use a mild soap that has a moisturizer, or use a moisturizer separately. ? Check your feet for blisters or cuts. ? Wear comfortable and supportive shoes that fit properly. ? Wear the correct size of panty hose and stockings. Avoid garters or knee-high or thigh-high stockings. · Ask your doctor how to treat any cuts, scratches, insect bites, or other injuries that may occur. · Use sunscreen and insect repellent when outdoors to protect your skin from sunburn and insect bites. · Wear medical alert jewelry that says you have lymphedema. You can buy these at most drugstores and on the Internet. When should you call for help? Call your doctor now or seek immediate medical care if:    · You have signs of infection, such as:  ? Increased pain, swelling, warmth, or redness. ? Red streaks leading from the area. ? Pus draining from the area. ? A fever. Watch closely for changes in your health, and be sure to contact your doctor if:    · You have new or worse symptoms from lymphedema.     · You do not get better as expected. Where can you learn more?   Go to http://www.gray.com/  Enter V398 in the search box to learn more about \"Lymphedema: Care Instructions. \"  Current as of: September 8, 2021               Content Version: 13.2  © 6331-7936 Healthwise, Incorporated. Care instructions adapted under license by [employer] (which disclaims liability or warranty for this information). If you have questions about a medical condition or this instruction, always ask your healthcare professional. [place] [address] any warranty or liability for your use of this information.

## 2022-04-21 NOTE — PROGRESS NOTES
Arslan Olsen is a 73 y.o. female    Chief Complaint   Patient presents with    Swelling       1. Have you been to the ER, urgent care clinic since your last visit? Hospitalized since your last visit? No  2. Have you seen or consulted any other health care providers outside of the [address] [place] [address] since your last visit? Include any pap smears or colon screening.  No    Visit Vitals  /76 (BP 1 Location: Right arm, BP Patient Position: Sitting)   Pulse 77   Resp 16   Ht 5' 9\" (1.753 m)   Wt 222 lb 6.4 oz (100.9 kg)   SpO2 95%   BMI 32.84 kg/m²       Health Maintenance Due   Topic Date Due    Hepatitis C Screening  Never done    DTaP/Tdap/Td series (1 - Tdap) Never done    Colorectal Cancer Screening Combo  Never done    Shingrix Vaccine Age 50> (1 of 2) Never done    Low dose CT lung screening  Never done    Bone Densitometry (Dexa) Screening  Never done    Pneumococcal 65+ years (1 - PCV) Never done       Patient states edema has been worse within last h, noticed it around her surgery

## 2022-05-04 ENCOUNTER — TELEPHONE (OUTPATIENT)
Dept: FAMILY MEDICINE CLINIC | Age: 67
End: 2022-05-04

## 2022-05-04 DIAGNOSIS — M79.89 LEFT UPPER EXTREMITY SWELLING: Primary | ICD-10-CM

## 2022-05-04 NOTE — TELEPHONE ENCOUNTER
----- Message from [facility] [place] sent at 5/3/2022  9:55 AM EDT -----  Subject: Message to Provider    QUESTIONS  Information for Provider? Patient referral for Lymphedema clinic needs to   say Occupational Therapy, not PT, office needs a new referral with changes   to be able to schedule patient. Fax number [phone]  ---------------------------------------------------------------------------  --------------  Josias GRADY  What is the best way for the office to contact you? OK to leave message on   voicemail  Preferred Call Back Phone Number? [phone]  ---------------------------------------------------------------------------  --------------  SCRIPT ANSWERS  Relationship to Patient? Third Party  Third Party Type? Physical Therapy Office? Representative Name?  Selma Vargas

## 2022-05-16 ENCOUNTER — HOSPITAL ENCOUNTER (OUTPATIENT)
Dept: MAMMOGRAPHY | Age: 67
Discharge: HOME | End: 2022-05-16
Attending: STUDENT IN AN ORGANIZED HEALTH CARE EDUCATION/TRAINING PROGRAM
Payer: COMMERCIAL

## 2022-05-16 DIAGNOSIS — Z12.31 ENCOUNTER FOR SCREENING MAMMOGRAM FOR MALIGNANT NEOPLASM OF BREAST: ICD-10-CM

## 2022-05-16 PROCEDURE — 77063 BREAST TOMOSYNTHESIS BI: CPT

## 2022-05-20 ENCOUNTER — OFFICE VISIT (OUTPATIENT)
Dept: FAMILY MEDICINE CLINIC | Age: 67
End: 2022-05-20
Payer: COMMERCIAL

## 2022-05-20 VITALS
DIASTOLIC BLOOD PRESSURE: 78 MMHG | HEIGHT: 69 IN | RESPIRATION RATE: 18 BRPM | OXYGEN SATURATION: 96 % | SYSTOLIC BLOOD PRESSURE: 131 MMHG | BODY MASS INDEX: 33.03 KG/M2 | HEART RATE: 84 BPM | WEIGHT: 223 LBS

## 2022-05-20 DIAGNOSIS — N95.1 HOT FLASHES DUE TO MENOPAUSE: Primary | ICD-10-CM

## 2022-05-20 DIAGNOSIS — F33.0 MAJOR DEPRESSIVE DISORDER, RECURRENT, MILD (HCC): ICD-10-CM

## 2022-05-20 DIAGNOSIS — I89.0 LYMPHEDEMA OF ARM: ICD-10-CM

## 2022-05-20 PROCEDURE — 1090F PRES/ABSN URINE INCON ASSESS: CPT | Performed by: STUDENT IN AN ORGANIZED HEALTH CARE EDUCATION/TRAINING PROGRAM

## 2022-05-20 PROCEDURE — G8752 SYS BP LESS 140: HCPCS | Performed by: STUDENT IN AN ORGANIZED HEALTH CARE EDUCATION/TRAINING PROGRAM

## 2022-05-20 PROCEDURE — G8536 NO DOC ELDER MAL SCRN: HCPCS | Performed by: STUDENT IN AN ORGANIZED HEALTH CARE EDUCATION/TRAINING PROGRAM

## 2022-05-20 PROCEDURE — 3017F COLORECTAL CA SCREEN DOC REV: CPT | Performed by: STUDENT IN AN ORGANIZED HEALTH CARE EDUCATION/TRAINING PROGRAM

## 2022-05-20 PROCEDURE — G8400 PT W/DXA NO RESULTS DOC: HCPCS | Performed by: STUDENT IN AN ORGANIZED HEALTH CARE EDUCATION/TRAINING PROGRAM

## 2022-05-20 PROCEDURE — 1101F PT FALLS ASSESS-DOCD LE1/YR: CPT | Performed by: STUDENT IN AN ORGANIZED HEALTH CARE EDUCATION/TRAINING PROGRAM

## 2022-05-20 PROCEDURE — G8417 CALC BMI ABV UP PARAM F/U: HCPCS | Performed by: STUDENT IN AN ORGANIZED HEALTH CARE EDUCATION/TRAINING PROGRAM

## 2022-05-20 PROCEDURE — 99214 OFFICE O/P EST MOD 30 MIN: CPT | Performed by: STUDENT IN AN ORGANIZED HEALTH CARE EDUCATION/TRAINING PROGRAM

## 2022-05-20 PROCEDURE — G8754 DIAS BP LESS 90: HCPCS | Performed by: STUDENT IN AN ORGANIZED HEALTH CARE EDUCATION/TRAINING PROGRAM

## 2022-05-20 PROCEDURE — G8427 DOCREV CUR MEDS BY ELIG CLIN: HCPCS | Performed by: STUDENT IN AN ORGANIZED HEALTH CARE EDUCATION/TRAINING PROGRAM

## 2022-05-20 PROCEDURE — G9899 SCRN MAM PERF RSLTS DOC: HCPCS | Performed by: STUDENT IN AN ORGANIZED HEALTH CARE EDUCATION/TRAINING PROGRAM

## 2022-05-20 PROCEDURE — G9717 DOC PT DX DEP/BP F/U NT REQ: HCPCS | Performed by: STUDENT IN AN ORGANIZED HEALTH CARE EDUCATION/TRAINING PROGRAM

## 2022-05-20 RX ORDER — VENLAFAXINE HYDROCHLORIDE 75 MG/1
CAPSULE, EXTENDED RELEASE ORAL
Qty: 60 CAPSULE | Refills: 0 | Status: SHIPPED | OUTPATIENT
Start: 2022-05-27 | End: 2022-06-15

## 2022-05-20 NOTE — PROGRESS NOTES
[address] [facility] [address] [address]   Office [phone], Fax [phone]    Subjective:     Chief Complaint   Patient presents with    Follow-up     follow up on hot flashes and LUE swelling. had mammo 5/16/22. on the waitlist for [facility] and [facility] lymphedema clinics     History provided by patient     Windy Beach is a 73 y.o. female presents for LUE edema and hot flashes. LUE edema  - Present for years but worsening over the last few months. No injury or previous surgeries to that shoulder or chest wall. Has had duplex that was negative for DVT previously  - Last mammogram 5/2022 and normal  - Was seen 4 weeks ago for this and she was referred to lymphedema clinic and encouraged to wear compression sleeve, which she has not been doing. She is on a waitlist for - 2 month    Hot flashes  - Started at 62, worse in the past 2 years  - LMP in her 35s after ?partial hysterectomy. She thinks her ovaries may have been left behind but she is not 100% sure  - TSH recently wnl  - Started on effexor 37.5 four weeks ago with no improvement of symptoms. She stopped taking it 4 days ago      Medication reviewed. Current Outpatient Medications:     [START ON 5/27/2022] venlafaxine-SR (EFFEXOR-XR) 75 mg capsule, Take 1 tab daily. If no improvement in symptoms after 1 week, you may start taking 2 tabs daily, Disp: 60 Capsule, Rfl: 0    venlafaxine-SR (EFFEXOR-XR) 37.5 mg capsule, Take 1 Capsule by mouth daily. , Disp: 30 Capsule, Rfl: 1    mv,Ca,min-folic acid-vit K1 658-94 mcg tab, Take 1 Tablet by mouth daily. , Disp: , Rfl:     estradioL (ESTRACE) 0.01 % (0.1 mg/gram) vaginal cream, APPLY A SMALL AMOUNT INTO VAGINA TWICE A WEEK, Disp: , Rfl:     atorvastatin (LIPITOR) 40 mg tablet, Take 1 Tablet by mouth daily. , Disp: 30 Tablet, Rfl: 2    lamoTRIgine (LaMICtal) 100 mg tablet, TAKE 3 TABLETS BY MOUTH DAILY.  FINAL DOSE: 1AM, 2PM, Disp: , Rfl:     pregabalin (LYRICA) 75 mg capsule, TAKE 3 CAPSULES BY MOUTH DAILY. 2 IN MORNING, 1 IN EVENING, Disp: , Rfl:     nitrofurantoin, macrocrystal-monohydrate, (MACROBID) 100 mg capsule, Take 100 mg by mouth daily. , Disp: , Rfl:     meloxicam (MOBIC) 15 mg tablet, Take 15 mg by mouth daily. , Disp: , Rfl:     hydroCHLOROthiazide (HYDRODIURIL) 25 mg tablet, Take 50 mg by mouth two (2) times a day., Disp: , Rfl:     aspirin (ASPIRIN) 325 mg tablet, Take 325 mg by mouth., Disp: , Rfl:      Allergy reviewed. No Known Allergies     Past Medical History:   Diagnosis Date    Arthritis     Seizure ([facility] [address]        Social History     Socioeconomic History    Marital status:    Tobacco Use    Smoking status: Former Smoker     Packs/day: 2.00     Years: 30.00     Pack years: 60.00     Quit date: 2012     Years since quitting: 10.3    Smokeless tobacco: Never Used   Vaping Use    Vaping Use: Never used   Substance and Sexual Activity    Alcohol use: Yes     Comment: occ    Drug use: Not Currently    Sexual activity: Yes       Review of Systems   Constitutional: Negative for chills and fever.        + hot flashes   HENT: Negative for congestion and sore throat. Respiratory: Negative for cough and shortness of breath. Cardiovascular: Negative for chest pain, palpitations and leg swelling. Gastrointestinal: Negative for abdominal pain, diarrhea, nausea and vomiting. Musculoskeletal: Negative for myalgias. Skin: Negative for rash. Neurological: Negative for dizziness and headaches. Objective:   Vitals - reviewed  Visit Vitals  /78 (BP 1 Location: Right upper arm, BP Patient Position: Sitting, BP Cuff Size: Large adult)   Pulse 84   Resp 18   Ht 5' 9\" (1.753 m)   Wt 223 lb (101.2 kg)   SpO2 96%   BMI 32.93 kg/m²        Physical exam:   GEN: NAD. Alert. Well nourished. EYES:  Conjunctiva clear  NECK:  Supple; no masses; thyroid normal           LUNGS: Respirations unlabored; CTAB.  no wheeze, rales, rhonchi   CARDIOVASCULAR: Regular, rate, and rhythm. + 2/6 murmur JOSE border  ABDOMEN: Soft; NT, ND. +bowel sounds; no rebound tenderness, no guarding. no masses or organomegaly  NEUROLOGIC:  No focal neurologic deficits. Strength and sensation grossly intact. MSK: FROM in all extremities (both passive and active). Good tone. + LUE non-pitting edema  EXT: Well perfused. No edema. No erythema. PSYCH: appropriate mood and affect. Good insight and judgement. Cooperative. SKIN: No obvious rashes or lesions. Assessment and orders:       ICD-10-CM ICD-9-CM    1. Hot flashes due to menopause  N95.1 627.2 venlafaxine-SR (EFFEXOR-XR) 75 mg capsule   2. Major depressive disorder, recurrent, mild (HCC)  F33.0 296.31    3. Lymphedema of arm  I89.0 457.1      LUE edema: Likely 2/2 lymphedema   - Compression sleeve to LUE   - Referral to lymphedema clinic    Hot flashes: TSH recently wnl, possibly 2/2 menopausal symptoms  - Restart effexor 37.5mg daily for 1 week then increase Effexor to 75mg daily, then can increase to 150mg daily after another week if still feel like symptoms could improve  - Follow up in 4 weeks for hot flashes        Pt was discussed and seen with Dr Ting Cano (attending physician). I have reviewed patient medical and social history and medications. I have reviewed pertinent labs results and other data. I have discussed the diagnosis with the patient and the intended plan as seen in the above orders. The patient has received an after-visit summary and questions were answered concerning future plans. I have discussed medication side effects and warnings with the patient as well.     Enzo Nance DO  Resident [facility]  05/20/22

## 2022-05-20 NOTE — PROGRESS NOTES
Olga Cadena is a 73 y.o. female    Chief Complaint   Patient presents with    Follow-up     follow up on hot flashes and LUE swelling. had mammo 5/16/22. on the waitlist for [facility] and [facility] lymphedema clinics       1. \"Have you been to the ER, urgent care clinic since your last visit? Hospitalized since your last visit? \" No    2. \"Have you seen or consulted any other health care providers outside of the [address] [place] [address] since your last visit? \" No     3. For patients aged 39-70: Has the patient had a colonoscopy / FIT/ Cologuard? No      If the patient is female:    4. For patients aged 41-77: Has the patient had a mammogram within the past 2 years? Yes - no Care Gap present      5. For patients aged 21-65: Has the patient had a pap smear? NA - based on age or sex    Vitals:    05/20/22 1308   BP: 131/78   BP 1 Location: Right upper arm   BP Patient Position: Sitting   BP Cuff Size: Large adult   Pulse: 84   Resp: 18   Height: 5' 9\" (1.753 m)   Weight: 223 lb (101.2 kg)   SpO2: 96%             Health Maintenance Due   Topic Date Due    Hepatitis C Screening  Never done    DTaP/Tdap/Td series (1 - Tdap) Never done    Colorectal Cancer Screening Combo  Never done    Low dose CT lung screening  Never done    Bone Densitometry (Dexa) Screening  Never done    Pneumococcal 65+ years (1 - PCV) Never done         Medication Reconciliation completed, changes noted.   Please update medication list.

## 2022-05-20 NOTE — PATIENT INSTRUCTIONS
Hot Flashes During Menopause: Care Instructions  Overview     A hot flash is a sudden feeling of intense body heat. Your head, neck, and chest may get red. Your heartbeat may speed up, and you may feel anxious. You may find that hot flashes occur more often in warm rooms or during stressful times. Hot flashes and other symptoms are a normal response to the hormone changes that occur before your menstrual cycle goes away completely (menopause). Hot flashes often get better and go away with time. Making lifestyle changes or taking medicine may help with symptoms. Follow-up care is a key part of your treatment and safety. Be sure to make and go to all appointments, and call your doctor if you are having problems. It's also a good idea to know your test results and keep a list of the medicines you take. How can you care for yourself at home? · If you decide to take medicine to treat hot flashes, take it exactly as prescribed. Call your doctor if you think you are having a problem with your medicine. You will get more details on the specific medicine your doctor prescribes. · Learn to meditate. Sit quietly and focus on your breathing. Try to practice each day. Books, classes, and tapes can help you start a program.  · Wear natural fabrics, such as cotton and silk. Dress in layers so you can take off clothes as needed. · Keep the room temperature cool, or use a fan. You are more likely to have a hot flash when you are too warm than when you are cool. · Use fewer blankets when you sleep at night. · Drink cold fluids rather than hot ones. · Limit food and drinks that make your symptoms worse. This may include things like caffeine, alcohol, or spicy foods. · Do not smoke. Smoking can make hot flashes worse. If you need help quitting, talk to your doctor about stop-smoking programs and medicines. These can increase your chances of quitting for good.   · Get at least 30 minutes of exercise on most days of the week. Walking is a good choice. You also may want to do other activities, such as running, swimming, cycling, or playing tennis or team sports. Where can you learn more? Go to http://www.gray.com/  Enter F700 in the search box to learn more about \"Hot Flashes During Menopause: Care Instructions. \"  Current as of: November 22, 2021               Content Version: 13.2  © 6089-5602 Healthwise, [employer] Care instructions adapted under license by [employer] (which disclaims liability or warranty for this information). If you have questions about a medical condition or this instruction, always ask your healthcare professional. [address] any warranty or liability for your use of this information.

## 2022-05-31 ENCOUNTER — HOSPITAL ENCOUNTER (OUTPATIENT)
Dept: NUTRITION | Age: 67
Discharge: HOME | End: 2022-05-31
Payer: COMMERCIAL

## 2022-05-31 DIAGNOSIS — E66.9 OBESITY, UNSPECIFIED CLASSIFICATION, UNSPECIFIED OBESITY TYPE, UNSPECIFIED WHETHER SERIOUS COMORBIDITY PRESENT: ICD-10-CM

## 2022-05-31 DIAGNOSIS — E78.5 HYPERLIPIDEMIA, UNSPECIFIED HYPERLIPIDEMIA TYPE: ICD-10-CM

## 2022-05-31 PROCEDURE — 97802 MEDICAL NUTRITION INDIV IN: CPT | Performed by: DIETITIAN, REGISTERED

## 2022-05-31 NOTE — PROGRESS NOTES
[address] [facility]     Nutrition Assessment - Medical Nutrition Therapy   Outpatient Initial Evaluation         Patient Name: Alirio Scott : 1955   Treatment Diagnosis: E66.9 (ICD-10-CM) - Obesity, unspecified   Referral Source: Bella Kumar DO Start of [facility] [place]): 2022     In time:   930am             Out time:   1030am   Total Treatment Time (min):   60     Gender: female Age: 73 y.o. Ht: 69 In Wt: 223 lb 101.2 kg   BMI: 33 (class I obesity)  BMR   Male  BMR Female 1621     Past Medical History:  Patient Active Problem List   Diagnosis Code    Endometriosis N80.9    Benign hypertension I10    Hyperlipidemia E78.5    Focal epilepsy ([address] G40.109    Osteoarthritis of knee M17.10    Seizures ([facility] [address] R56.9    History of TIAs Z86.73    Major depressive disorder, recurrent, mild F33.0    Lymphedema of arm I89.0        Pertinent Medications:     Current Outpatient Medications:     venlafaxine-SR (EFFEXOR-XR) 75 mg capsule, Take 1 tab daily. If no improvement in symptoms after 1 week, you may start taking 2 tabs daily, Disp: 60 Capsule, Rfl: 0    venlafaxine-SR (EFFEXOR-XR) 37.5 mg capsule, Take 1 Capsule by mouth daily. , Disp: 30 Capsule, Rfl: 1    mv,Ca,min-folic acid-vit K1 172-65 mcg tab, Take 1 Tablet by mouth daily. , Disp: , Rfl:     estradioL (ESTRACE) 0.01 % (0.1 mg/gram) vaginal cream, APPLY A SMALL AMOUNT INTO VAGINA TWICE A WEEK, Disp: , Rfl:     atorvastatin (LIPITOR) 40 mg tablet, Take 1 Tablet by mouth daily. , Disp: 30 Tablet, Rfl: 2    lamoTRIgine (LaMICtal) 100 mg tablet, TAKE 3 TABLETS BY MOUTH DAILY. FINAL DOSE: 1AM, 2PM, Disp: , Rfl:     pregabalin (LYRICA) 75 mg capsule, TAKE 3 CAPSULES BY MOUTH DAILY. 2 IN MORNING, 1 IN EVENING, Disp: , Rfl:     nitrofurantoin, macrocrystal-monohydrate, (MACROBID) 100 mg capsule, Take 100 mg by mouth daily. , Disp: , Rfl:     meloxicam (MOBIC) 15 mg tablet, Take 15 mg by mouth daily. , Disp: , Rfl:   hydroCHLOROthiazide (HYDRODIURIL) 25 mg tablet, Take 50 mg by mouth two (2) times a day., Disp: , Rfl:     aspirin (ASPIRIN) 325 mg tablet, Take 325 mg by mouth., Disp: , Rfl:      Biochemical Data:   Lab Results   Component Value Date/Time    Hemoglobin A1c 5.2 02/18/2022 10:30 AM     Lab Results   Component Value Date/Time    Sodium 140 03/09/2022 11:35 AM    Potassium 3.6 03/09/2022 11:35 AM    Chloride 101 03/09/2022 11:35 AM    CO2 36 (H) 03/09/2022 11:35 AM    Anion gap 3 (L) 03/09/2022 11:35 AM    Glucose 106 (H) 03/09/2022 11:35 AM    BUN 21 (H) 03/09/2022 11:35 AM    Creatinine 0.72 03/09/2022 11:35 AM    BUN/Creatinine ratio 29 (H) 03/09/2022 11:35 AM    GFR est AA >60 03/09/2022 11:35 AM    GFR est non-AA >60 03/09/2022 11:35 AM    Calcium 9.9 03/09/2022 11:35 AM    Bilirubin, total 0.3 06/25/2021 03:34 PM    Alk. phosphatase 125 (H) 06/25/2021 03:34 PM    Protein, total 6.7 06/25/2021 03:34 PM    Albumin 3.9 06/25/2021 03:34 PM    Globulin 2.8 06/25/2021 03:34 PM    A-G Ratio 1.4 06/25/2021 03:34 PM    ALT (SGPT) 20 06/25/2021 03:34 PM    AST (SGOT) 10 (L) 06/25/2021 03:34 PM     Lab Results   Component Value Date/Time    Cholesterol, total 224 (H) 02/18/2022 10:30 AM    HDL Cholesterol 61 02/18/2022 10:30 AM    LDL, calculated 123.8 (H) 02/18/2022 10:30 AM    VLDL, calculated 39.2 02/18/2022 10:30 AM    Triglyceride 196 (H) 02/18/2022 10:30 AM    CHOL/HDL Ratio 3.7 02/18/2022 10:30 AM        Assessment:   Pt is a 74 yo female seen today for obesity. Pt recent lab results show chol 224 (H),  (H), and  (H). Pt and  noted that she has been weight stable over the past 3 years. Pt has had difficulty losing weight since her hysterectomy. Pt has recently changed how she has been eating, working to cut out snack foods like chips. Pt is now only eating 2 meals a day and has been drinking more water or sugar free drink choices.    Recent hip surgery and has just finished up 6 weeks of physical therapy. Pt is still very limited in terms of mobility but has been attempting to get in some walking each day. Pt has also recently joined the [facility] and plans to start doing exercises within some of their programming. Pt is also limited with arthritis which seems to be worsening. Lots of swelling on left side alyson body and wearing compression sleeve, water pill and lyphedema clinic (waiting to get in). Wt Readings from Last 3 Encounters:   05/20/22 101.2 kg (223 lb)   04/21/22 100.9 kg (222 lb 6.4 oz)   03/09/22 99.8 kg (220 lb)            Food & Nutrition: B- 10am: Spinach, banana, pear, strawberry, greek yogurt as a smoothie   S-   L-   S-   D- 7pm:  Fish and rice with vegetable, salad; Salad and vegetable with every meal   S- Ice cream, small piece of pie   Drinks: coffee w 2 tbsp cream, 1.5 big glasses of water daily, green tea with truvia      Pt recently cut back on intake of chips as she was having many servings of chips throughout the day. Estimate Needs:    Equation( [x] MSJ ; []  HBE; [] Rosenda Pizarro; [] other)  * Activity Factor (1.3) - 500   Calories:  1600 Protein: 100 Carbs: 180 Fat: 53   Kcal/day  g/day  g/day  g/day        percent: 25  45  30               Nutrition Diagnosis Obesity R/T excessive energy intake AEB BMI >30     Food and nutrition related knowledge deficit R/T lack of prior education for healthy weight loss AEB request for counseling   Nutrition Intervention &  Education: Educated pt on the basics of healthy weight loss and lowering cholesterol and the rationale for dietary modifications and increased activity. Educated pt on lean proteins, healthy fats, non-starchy vegetables, and complex carbohydrate food sources. Discussed limiting carbohydrates, label reading, meal timing, and appropriate serving sizes. Encouraged pt to avoid sugary beverages. Reviewed meal builder tool, calorie and macro breakdown as well as exercise parameters.    Discussed low sodium recommendations and foods associated with inflammation   Handouts Provided: []  Carbohydrates  []  Protein  [x]  Non-starchy Vegatbles  []  Food Label  []  Meal and Snack Ideas  []  Food Journals []  Diabetes  []  Cholesterol  []  Sodium  []  Gen Nutr Guidelines  []  SBGM Guidelines  [x]  Others: Inflammation, Meal builder    Information Reviewed with: Pt &     Readiness to Change Stage: []  Pre-contemplative    []  Contemplative  [x]  Preparation               []  Action                  []  Maintenance   Potential Barriers to Learning: []  Decline in memory    []  Language barrier   []  Other:  []  Emotional                  [x]  Limited mobility     []  None  []  Lack of motivation     [] Vision, hearing or cognitive impairment   Expected Compliance: Pt compliance is fair given the other medical challenges pt is currently having to prioritize. Nutritional Goal - To promote lifestyle changes to result in:    [x]  Weight loss  []  Improved diabetic control  [x]  Decreased cholesterol levels  []  Decreased blood pressure  []  Weight maintenance []  Preventing any interactions associated with food allergies  []  Adequate weight gain toward goal weight  []  Other:        Patient Goals:   - Improve consistency of meal timing by going no longer than 4 hours without a meal or snack   - Improve physical activity w/goal to do light exercise for 30 minutes 5 times per week.   - Incorporate more protein foods into meals and snacks during the day      Dietitian Signature: Harlin Cockayne, RDN Date: 5/31/2022   Follow-up: Call as needed Time: 930 AM

## 2022-06-05 DIAGNOSIS — E78.5 HYPERLIPIDEMIA, UNSPECIFIED HYPERLIPIDEMIA TYPE: ICD-10-CM

## 2022-06-07 DIAGNOSIS — E78.5 HYPERLIPIDEMIA, UNSPECIFIED HYPERLIPIDEMIA TYPE: Primary | ICD-10-CM

## 2022-06-07 RX ORDER — ATORVASTATIN CALCIUM 40 MG/1
TABLET, FILM COATED ORAL
Qty: 90 TABLET | Refills: 0 | Status: SHIPPED | OUTPATIENT
Start: 2022-06-07

## 2022-06-09 ENCOUNTER — TELEPHONE (OUTPATIENT)
Dept: FAMILY MEDICINE CLINIC | Age: 67
End: 2022-06-09

## 2022-06-09 ENCOUNTER — LAB ONLY (OUTPATIENT)
Dept: FAMILY MEDICINE CLINIC | Age: 67
End: 2022-06-09

## 2022-06-09 DIAGNOSIS — E78.5 HYPERLIPIDEMIA, UNSPECIFIED HYPERLIPIDEMIA TYPE: ICD-10-CM

## 2022-06-09 LAB
CHOLEST SERPL-MCNC: 189 MG/DL
HDLC SERPL-MCNC: 43 MG/DL
HDLC SERPL: 4.4 {RATIO} (ref 0–5)
LDLC SERPL CALC-MCNC: 89 MG/DL (ref 0–100)
TRIGL SERPL-MCNC: 285 MG/DL (ref ?–150)
VLDLC SERPL CALC-MCNC: 57 MG/DL

## 2022-06-09 NOTE — TELEPHONE ENCOUNTER
Called Pt to schedule lab appt per Dr. Precious Roper. PT hung up the 1st time. The 2nd time I called back it went to  and Left a message asking the pt to call us back to schedule her Lab appt.

## 2022-06-09 NOTE — TELEPHONE ENCOUNTER
----- Message from Claudia Malhotra DO sent at 6/7/2022  8:22 AM EDT -----  Regarding: schedule lab visit  Please schedule patient lab visit to recheck cholesterol as soon as she is able. Thanks!

## 2022-06-21 ENCOUNTER — OFFICE VISIT (OUTPATIENT)
Dept: FAMILY MEDICINE CLINIC | Age: 67
End: 2022-06-21
Payer: COMMERCIAL

## 2022-06-21 VITALS
WEIGHT: 218 LBS | HEART RATE: 83 BPM | SYSTOLIC BLOOD PRESSURE: 149 MMHG | OXYGEN SATURATION: 95 % | BODY MASS INDEX: 32.19 KG/M2 | DIASTOLIC BLOOD PRESSURE: 81 MMHG

## 2022-06-21 DIAGNOSIS — I89.0 LYMPHEDEMA OF ARM: ICD-10-CM

## 2022-06-21 DIAGNOSIS — N95.1 HOT FLASHES DUE TO MENOPAUSE: Primary | ICD-10-CM

## 2022-06-21 PROCEDURE — G8754 DIAS BP LESS 90: HCPCS | Performed by: STUDENT IN AN ORGANIZED HEALTH CARE EDUCATION/TRAINING PROGRAM

## 2022-06-21 PROCEDURE — 1123F ACP DISCUSS/DSCN MKR DOCD: CPT | Performed by: STUDENT IN AN ORGANIZED HEALTH CARE EDUCATION/TRAINING PROGRAM

## 2022-06-21 PROCEDURE — G9717 DOC PT DX DEP/BP F/U NT REQ: HCPCS | Performed by: STUDENT IN AN ORGANIZED HEALTH CARE EDUCATION/TRAINING PROGRAM

## 2022-06-21 PROCEDURE — 1101F PT FALLS ASSESS-DOCD LE1/YR: CPT | Performed by: STUDENT IN AN ORGANIZED HEALTH CARE EDUCATION/TRAINING PROGRAM

## 2022-06-21 PROCEDURE — G8427 DOCREV CUR MEDS BY ELIG CLIN: HCPCS | Performed by: STUDENT IN AN ORGANIZED HEALTH CARE EDUCATION/TRAINING PROGRAM

## 2022-06-21 PROCEDURE — 1090F PRES/ABSN URINE INCON ASSESS: CPT | Performed by: STUDENT IN AN ORGANIZED HEALTH CARE EDUCATION/TRAINING PROGRAM

## 2022-06-21 PROCEDURE — G8400 PT W/DXA NO RESULTS DOC: HCPCS | Performed by: STUDENT IN AN ORGANIZED HEALTH CARE EDUCATION/TRAINING PROGRAM

## 2022-06-21 PROCEDURE — G8753 SYS BP > OR = 140: HCPCS | Performed by: STUDENT IN AN ORGANIZED HEALTH CARE EDUCATION/TRAINING PROGRAM

## 2022-06-21 PROCEDURE — G8417 CALC BMI ABV UP PARAM F/U: HCPCS | Performed by: STUDENT IN AN ORGANIZED HEALTH CARE EDUCATION/TRAINING PROGRAM

## 2022-06-21 PROCEDURE — G9899 SCRN MAM PERF RSLTS DOC: HCPCS | Performed by: STUDENT IN AN ORGANIZED HEALTH CARE EDUCATION/TRAINING PROGRAM

## 2022-06-21 PROCEDURE — 3017F COLORECTAL CA SCREEN DOC REV: CPT | Performed by: STUDENT IN AN ORGANIZED HEALTH CARE EDUCATION/TRAINING PROGRAM

## 2022-06-21 PROCEDURE — G8536 NO DOC ELDER MAL SCRN: HCPCS | Performed by: STUDENT IN AN ORGANIZED HEALTH CARE EDUCATION/TRAINING PROGRAM

## 2022-06-21 PROCEDURE — 99214 OFFICE O/P EST MOD 30 MIN: CPT | Performed by: STUDENT IN AN ORGANIZED HEALTH CARE EDUCATION/TRAINING PROGRAM

## 2022-06-21 RX ORDER — VENLAFAXINE HYDROCHLORIDE 150 MG/1
150 CAPSULE, EXTENDED RELEASE ORAL DAILY
Qty: 90 CAPSULE | Refills: 1 | Status: SHIPPED | OUTPATIENT
Start: 2022-06-21 | End: 2022-07-14

## 2022-06-21 RX ORDER — CLONIDINE HYDROCHLORIDE 0.1 MG/1
0.05 TABLET ORAL 2 TIMES DAILY
Qty: 30 TABLET | Refills: 0 | Status: SHIPPED | OUTPATIENT
Start: 2022-06-21 | End: 2022-07-20 | Stop reason: SDUPTHER

## 2022-06-21 NOTE — PATIENT INSTRUCTIONS
Hot Flashes During Menopause: Care Instructions  Overview     A hot flash is a sudden feeling of intense body heat. Your head, neck, and chest may get red. Your heartbeat may speed up, and you may feel anxious. You may find that hot flashes occur more often in warm rooms or during stressful times. Hot flashes and other symptoms are a normal response to the hormone changes that occur before your menstrual cycle goes away completely (menopause). Hot flashes often get better and go away with time. Making lifestyle changes or taking medicine may help with symptoms. Follow-up care is a key part of your treatment and safety. Be sure to make and go to all appointments, and call your doctor if you are having problems. It's also a good idea to know your test results and keep a list of the medicines you take. How can you care for yourself at home? · If you decide to take medicine to treat hot flashes, take it exactly as prescribed. Call your doctor if you think you are having a problem with your medicine. You will get more details on the specific medicine your doctor prescribes. · Learn to meditate. Sit quietly and focus on your breathing. Try to practice each day. Books, classes, and tapes can help you start a program.  · Wear natural fabrics, such as cotton and silk. Dress in layers so you can take off clothes as needed. · Keep the room temperature cool, or use a fan. You are more likely to have a hot flash when you are too warm than when you are cool. · Use fewer blankets when you sleep at night. · Drink cold fluids rather than hot ones. · Limit food and drinks that make your symptoms worse. This may include things like caffeine, alcohol, or spicy foods. · Do not smoke. Smoking can make hot flashes worse. If you need help quitting, talk to your doctor about stop-smoking programs and medicines. These can increase your chances of quitting for good.   · Get at least 30 minutes of exercise on most days of the week. Walking is a good choice. You also may want to do other activities, such as running, swimming, cycling, or playing tennis or team sports. Where can you learn more? Go to http://www.gray.com/  Enter F700 in the search box to learn more about \"Hot Flashes During Menopause: Care Instructions. \"  Current as of: November 22, 2021               Content Version: 13.2  © 7275-9344 Healthwise, [employer]. Care instructions adapted under license by [employer] (which disclaims liability or warranty for this information). If you have questions about a medical condition or this instruction, always ask your healthcare professional. [address] any warranty or liability for your use of this information.

## 2022-06-21 NOTE — PROGRESS NOTES
[address] [facility] [address] [facility]   Office [phone], Fax [phone]    Subjective:     Chief Complaint   Patient presents with    Follow-up     hot flashes     History provided by patient     Gale Massey is a 73 y.o. female presents for follow up of lymphedema and hot flashes.      Hot flashes  - Started at 62, worse in the past 2 years  - LMP in her 35s after ?partial hysterectomy. She thinks her ovaries may have been left behind but she is not 100% sure  - TSH recently wnl  - Started on effexor a few weeks ago and has titrated herself up to 150 mg daily with minimal to no improvement of her hot flashes, but does feel an improvement of her mood and irritability. LUE edema  - Present for years but worsening over the last few months. No injury or previous surgeries to that shoulder or chest wall. Has had duplex that was negative for DVT previously  - Last mammogram 5/2022 and normal  - Has been wearing a compression sleeve, with an improvement of her symptoms. She is also on a waitlist for [facility]- should here from them soon        Medication reviewed. Current Outpatient Medications:     cloNIDine HCL (CATAPRES) 0.1 mg tablet, Take 0.5 Tablets by mouth two (2) times a day for 30 days. , Disp: 30 Tablet, Rfl: 0    venlafaxine-SR (EFFEXOR-XR) 150 mg capsule, Take 1 Capsule by mouth daily. , Disp: 90 Capsule, Rfl: 1    atorvastatin (LIPITOR) 40 mg tablet, TAKE 1 TABLET BY MOUTH EVERY DAY, Disp: 90 Tablet, Rfl: 0    mv,Ca,min-folic acid-vit K1 311-33 mcg tab, Take 1 Tablet by mouth daily. , Disp: , Rfl:     estradioL (ESTRACE) 0.01 % (0.1 mg/gram) vaginal cream, APPLY A SMALL AMOUNT INTO VAGINA TWICE A WEEK, Disp: , Rfl:     lamoTRIgine (LaMICtal) 100 mg tablet, TAKE 3 TABLETS BY MOUTH DAILY. FINAL DOSE: 1AM, 2PM, Disp: , Rfl:     pregabalin (LYRICA) 75 mg capsule, TAKE 3 CAPSULES BY MOUTH DAILY.  2 IN MORNING, 1 IN EVENING, Disp: , Rfl:     nitrofurantoin, macrocrystal-monohydrate, (MACROBID) 100 mg capsule, Take 100 mg by mouth daily. , Disp: , Rfl:     meloxicam (MOBIC) 15 mg tablet, Take 15 mg by mouth daily. , Disp: , Rfl:     hydroCHLOROthiazide (HYDRODIURIL) 25 mg tablet, Take 50 mg by mouth two (2) times a day., Disp: , Rfl:     aspirin (ASPIRIN) 325 mg tablet, Take 325 mg by mouth., Disp: , Rfl:      Allergy reviewed. No Known Allergies     Past Medical History:   Diagnosis Date    Arthritis     Seizure ([facility] [address]        Social History     Socioeconomic History    Marital status:    Tobacco Use    Smoking status: Former Smoker     Packs/day: 2.00     Years: 30.00     Pack years: 60.00     Quit date: 2012     Years since quitting: 10.4    Smokeless tobacco: Never Used   Vaping Use    Vaping Use: Never used   Substance and Sexual Activity    Alcohol use: Yes     Comment: occ    Drug use: Not Currently    Sexual activity: Yes       Review of Systems   Constitutional: Negative for chills, fever and malaise/fatigue. HENT: Negative for congestion and sore throat. Respiratory: Negative for cough, sputum production and shortness of breath. Cardiovascular: Negative for chest pain and palpitations. Gastrointestinal: Negative for abdominal pain, diarrhea, nausea and vomiting. Musculoskeletal: Negative for myalgias. Skin: Negative for rash. Neurological: Negative for dizziness and headaches. Objective:   Vitals - reviewed  Visit Vitals  BP (!) 149/81 (BP 1 Location: Right arm, BP Patient Position: Sitting, BP Cuff Size: Adult)   Pulse 83   Wt 218 lb (98.9 kg)   SpO2 95%   BMI 32.19 kg/m²        Physical exam:   GEN: NAD. Alert. Well nourished. EYES:  Conjunctiva clear      LUNGS: Respirations unlabored; CTAB. no wheeze, rales, rhonchi   CARDIOVASCULAR: Regular, rate, and rhythm without murmurs, gallops or rubs   NEUROLOGIC:  No focal neurologic deficits. Strength and sensation grossly intact. MSK: FROM in all extremities (both passive and active). Good tone. No vertebral tenderness. EXT: Well perfused. LUE>RUE, has compression sleeves on both arms  PSYCH: appropriate mood and affect. Good insight and judgement. Cooperative. SKIN: No obvious rashes or lesions. Assessment and orders:       ICD-10-CM ICD-9-CM    1. Hot flashes due to menopause  N95.1 627.2 cloNIDine HCL (CATAPRES) 0.1 mg tablet      venlafaxine-SR (EFFEXOR-XR) 150 mg capsule   2. Lymphedema of arm  I89.0 457.1      Hot flashes: TSH recently wnl, possibly 2/2 menopausal symptoms vs temperature instability   - Continue effexor at 150mg daily since it has been helping her mood  - Start clonidine 0.05mg bid to try and help with hot flashes (will also help with elevated BP noted on 2 occasions at today's visit)  - Follow up in 4 weeks     LUE edema: Likely 2/2 lymphedema   - Continue compression sleeves  - Follow up with lymphedema clinic       Pt was discussed with Dr Jessie Corona (attending physician). I have reviewed patient medical and social history and medications. I have reviewed pertinent labs results and other data. I have discussed the diagnosis with the patient and the intended plan as seen in the above orders. The patient has received an after-visit summary and questions were answered concerning future plans. I have discussed medication side effects and warnings with the patient as well.     Eleno Quan DO  Resident [facility]  06/21/22

## 2022-06-21 NOTE — PROGRESS NOTES
Chief Complaint   Patient presents with    Follow-up     hot flashes     Visit Vitals  BP (!) 158/94 (BP 1 Location: Right arm, BP Patient Position: Sitting, BP Cuff Size: Adult)   Pulse 83   Wt 218 lb (98.9 kg)   SpO2 95%   BMI 32.19 kg/m²

## 2022-07-20 ENCOUNTER — OFFICE VISIT (OUTPATIENT)
Dept: FAMILY MEDICINE CLINIC | Age: 67
End: 2022-07-20
Payer: COMMERCIAL

## 2022-07-20 VITALS
BODY MASS INDEX: 32.67 KG/M2 | OXYGEN SATURATION: 98 % | WEIGHT: 221.25 LBS | SYSTOLIC BLOOD PRESSURE: 121 MMHG | HEART RATE: 70 BPM | DIASTOLIC BLOOD PRESSURE: 77 MMHG

## 2022-07-20 DIAGNOSIS — N95.1 HOT FLASHES DUE TO MENOPAUSE: Primary | ICD-10-CM

## 2022-07-20 DIAGNOSIS — K59.00 CONSTIPATION, UNSPECIFIED CONSTIPATION TYPE: ICD-10-CM

## 2022-07-20 PROBLEM — R23.2 HOT FLASHES: Status: ACTIVE | Noted: 2022-07-20

## 2022-07-20 PROCEDURE — 99214 OFFICE O/P EST MOD 30 MIN: CPT | Performed by: STUDENT IN AN ORGANIZED HEALTH CARE EDUCATION/TRAINING PROGRAM

## 2022-07-20 PROCEDURE — 1101F PT FALLS ASSESS-DOCD LE1/YR: CPT | Performed by: STUDENT IN AN ORGANIZED HEALTH CARE EDUCATION/TRAINING PROGRAM

## 2022-07-20 PROCEDURE — 1090F PRES/ABSN URINE INCON ASSESS: CPT | Performed by: STUDENT IN AN ORGANIZED HEALTH CARE EDUCATION/TRAINING PROGRAM

## 2022-07-20 PROCEDURE — G8417 CALC BMI ABV UP PARAM F/U: HCPCS | Performed by: STUDENT IN AN ORGANIZED HEALTH CARE EDUCATION/TRAINING PROGRAM

## 2022-07-20 PROCEDURE — G8752 SYS BP LESS 140: HCPCS | Performed by: STUDENT IN AN ORGANIZED HEALTH CARE EDUCATION/TRAINING PROGRAM

## 2022-07-20 PROCEDURE — 3017F COLORECTAL CA SCREEN DOC REV: CPT | Performed by: STUDENT IN AN ORGANIZED HEALTH CARE EDUCATION/TRAINING PROGRAM

## 2022-07-20 PROCEDURE — G8400 PT W/DXA NO RESULTS DOC: HCPCS | Performed by: STUDENT IN AN ORGANIZED HEALTH CARE EDUCATION/TRAINING PROGRAM

## 2022-07-20 PROCEDURE — G9717 DOC PT DX DEP/BP F/U NT REQ: HCPCS | Performed by: STUDENT IN AN ORGANIZED HEALTH CARE EDUCATION/TRAINING PROGRAM

## 2022-07-20 PROCEDURE — G9899 SCRN MAM PERF RSLTS DOC: HCPCS | Performed by: STUDENT IN AN ORGANIZED HEALTH CARE EDUCATION/TRAINING PROGRAM

## 2022-07-20 PROCEDURE — G8754 DIAS BP LESS 90: HCPCS | Performed by: STUDENT IN AN ORGANIZED HEALTH CARE EDUCATION/TRAINING PROGRAM

## 2022-07-20 PROCEDURE — G8536 NO DOC ELDER MAL SCRN: HCPCS | Performed by: STUDENT IN AN ORGANIZED HEALTH CARE EDUCATION/TRAINING PROGRAM

## 2022-07-20 PROCEDURE — G8427 DOCREV CUR MEDS BY ELIG CLIN: HCPCS | Performed by: STUDENT IN AN ORGANIZED HEALTH CARE EDUCATION/TRAINING PROGRAM

## 2022-07-20 PROCEDURE — 1123F ACP DISCUSS/DSCN MKR DOCD: CPT | Performed by: STUDENT IN AN ORGANIZED HEALTH CARE EDUCATION/TRAINING PROGRAM

## 2022-07-20 RX ORDER — POLYETHYLENE GLYCOL 3350 17 G/17G
17 POWDER, FOR SOLUTION ORAL DAILY
Qty: 30 PACKET | Refills: 2 | Status: SHIPPED | OUTPATIENT
Start: 2022-07-20 | End: 2022-10-18

## 2022-07-20 RX ORDER — CLONIDINE HYDROCHLORIDE 0.1 MG/1
0.05 TABLET ORAL 2 TIMES DAILY
Qty: 90 TABLET | Refills: 1 | Status: SHIPPED | OUTPATIENT
Start: 2022-07-20 | End: 2023-01-16

## 2022-07-20 NOTE — PROGRESS NOTES
[address] [facility] [address] [place] [address]   Office [phone], Fax [phone]    Subjective:     Chief Complaint   Patient presents with    Follow-up     Per hot flashes     History provided by patient     Jason Teresa is a 73 y.o. female presents for follow up of hot flashes. She is also complaining of constipation. Hot flashes  - Started at 62, worse in the past 2 years  - LMP in her 35s after ?partial hysterectomy. She thinks her ovaries may have been left behind but she is not 100% sure  - TSH recently wnl  - Started on effexor 1-2 months ago and has titrated herself up to 150 mg daily with minimal to no improvement of her hot flashes, but does feel an improvement of her mood and irritability. At visit about 4 weeks ago- was also started on clonidine 0.05mg bid for elevated BP and hot flashes. Has noticed significant improvement in hot flashes since addition of clonidine. Constipation  - Been an issue for years  - Stools are described as small, hard pellets. Last BM 4-5 days ago. Still is passing gas. Took mag citrate 4-5 days ago and had a normal BM  - Has tried metamucil previously but it made her feel sick. Has also tried miralax inconsistently with some relief     Medication reviewed. Current Outpatient Medications:     polyethylene glycol (MIRALAX) 17 gram packet, Take 1 Packet by mouth in the morning for 90 days. , Disp: 30 Packet, Rfl: 2    cloNIDine HCL (CATAPRES) 0.1 mg tablet, Take 0.5 Tablets by mouth two (2) times a day for 180 days. , Disp: 90 Tablet, Rfl: 1    venlafaxine-SR (EFFEXOR-XR) 150 mg capsule, Take 1 Capsule by mouth daily. , Disp: 90 Capsule, Rfl: 0    atorvastatin (LIPITOR) 40 mg tablet, TAKE 1 TABLET BY MOUTH EVERY DAY, Disp: 90 Tablet, Rfl: 0    mv,Ca,min-folic acid-vit K1 341-19 mcg tab, Take 1 Tablet by mouth daily. , Disp: , Rfl:     estradioL (ESTRACE) 0.01 % (0.1 mg/gram) vaginal cream, APPLY A SMALL AMOUNT INTO VAGINA TWICE A WEEK, Disp: , Rfl: lamoTRIgine (LaMICtal) 100 mg tablet, TAKE 3 TABLETS BY MOUTH DAILY. FINAL DOSE: 1AM, 2PM, Disp: , Rfl:     pregabalin (LYRICA) 75 mg capsule, TAKE 3 CAPSULES BY MOUTH DAILY. 2 IN MORNING, 1 IN EVENING, Disp: , Rfl:     nitrofurantoin, macrocrystal-monohydrate, (MACROBID) 100 mg capsule, Take 100 mg by mouth daily. , Disp: , Rfl:     meloxicam (MOBIC) 15 mg tablet, Take 15 mg by mouth daily. , Disp: , Rfl:     hydroCHLOROthiazide (HYDRODIURIL) 25 mg tablet, Take 50 mg by mouth two (2) times a day., Disp: , Rfl:     aspirin (ASPIRIN) 325 mg tablet, Take 325 mg by mouth., Disp: , Rfl:      Allergy reviewed. No Known Allergies     Past Medical History:   Diagnosis Date    Arthritis     Seizure ([facility] [address]        Social History     Socioeconomic History    Marital status:    Tobacco Use    Smoking status: Former     Packs/day: 2.00     Years: 30.00     Pack years: 60.00     Types: Cigarettes     Quit date: 2012     Years since quitting: 10.5    Smokeless tobacco: Never   Vaping Use    Vaping Use: Never used   Substance and Sexual Activity    Alcohol use: Yes     Comment: occ    Drug use: Not Currently    Sexual activity: Yes       Review of Systems   Constitutional:  Negative for chills, fever and malaise/fatigue. HENT:  Negative for congestion and sore throat. Respiratory:  Negative for cough, sputum production and shortness of breath. Cardiovascular:  Negative for chest pain and palpitations. Gastrointestinal:  Positive for constipation. Negative for abdominal pain, blood in stool, diarrhea, nausea and vomiting. Musculoskeletal:  Negative for myalgias. Skin:  Negative for rash. Neurological:  Negative for dizziness and headaches. Objective:   Vitals - reviewed  Visit Vitals  /77   Pulse 70   Wt 221 lb 4 oz (100.4 kg)   SpO2 98%   BMI 32.67 kg/m²        Physical exam:   GEN: NAD. Alert. Well nourished. EYES:  Conjunctiva clear      LUNGS: Respirations unlabored; CTAB.  no wheeze, rales, rhonchi   CARDIOVASCULAR: Regular, rate, and rhythm without murmurs, gallops or rubs   NEUROLOGIC:  No focal neurologic deficits. Strength and sensation grossly intact. MSK: FROM in all extremities (both passive and active). Good tone. No vertebral tenderness. EXT: Well perfused. LUE>RUE, has compression sleeves on both arms  PSYCH: appropriate mood and affect. Good insight and judgement. Cooperative. SKIN: No obvious rashes or lesions. Assessment and orders:       ICD-10-CM ICD-9-CM    1. Hot flashes due to menopause  N95.1 627.2 cloNIDine HCL (CATAPRES) 0.1 mg tablet      2. Constipation, unspecified constipation type  K59.00 564.00 polyethylene glycol (MIRALAX) 17 gram packet          Hot flashes: TSH recently wnl, possibly 2/2 menopausal symptoms vs temperature instability   - Continue effexor at 150mg daily since it has been helping her mood  - Continue clonidine 0.05mg bid    Constipation  - Miralax daily with goal of soft stools every 1-2 days. Discussed titrating miralax on her own with that goal in mind  - Maintain adequate hydration     Follow up in 4 weeks for annual physical     Pt was discussed with Dr Analilia Gillette (attending physician). I have reviewed patient medical and social history and medications. I have reviewed pertinent labs results and other data. I have discussed the diagnosis with the patient and the intended plan as seen in the above orders. The patient has received an after-visit summary and questions were answered concerning future plans. I have discussed medication side effects and warnings with the patient as well.     Ronna Davis DO  Resident [facility]  07/21/22

## 2022-07-20 NOTE — PROGRESS NOTES
Chief Complaint   Patient presents with    Follow-up     Per hot flashes     Visit Vitals  /77   Pulse 70   Wt 221 lb 4 oz (100.4 kg)   SpO2 98%   BMI 32.67 kg/m²

## 2022-07-21 ENCOUNTER — HOSPITAL ENCOUNTER (OUTPATIENT)
Dept: PHYSICAL THERAPY | Age: 67
Discharge: HOME | End: 2022-07-21
Payer: COMMERCIAL

## 2022-07-21 VITALS
BODY MASS INDEX: 32.58 KG/M2 | OXYGEN SATURATION: 96 % | HEART RATE: 80 BPM | HEIGHT: 69 IN | DIASTOLIC BLOOD PRESSURE: 98 MMHG | WEIGHT: 220 LBS | SYSTOLIC BLOOD PRESSURE: 148 MMHG

## 2022-07-21 PROCEDURE — 97530 THERAPEUTIC ACTIVITIES: CPT

## 2022-07-21 PROCEDURE — 97162 PT EVAL MOD COMPLEX 30 MIN: CPT

## 2022-07-21 PROCEDURE — 97140 MANUAL THERAPY 1/> REGIONS: CPT
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
## 2022-07-21 NOTE — PROGRESS NOTES
Statement of Medical Necessity  Page 1 of 2      Kamala Win 1955 Today's Date: 7/21/2022 HORACE: Lifetime   Payor: Charlotte Guerrero / Plan: [place] / Product Type: Medicare /  ME: TBD  Refills: 2               Diagnosis  []   I97.2 Post-Mastectomy Lymphedema []   I87.2 Venous Insufficiency   []   I89.0 Lymphedema, other secondary  []   I83.019 Venous Stasis Ulcer LE, Right   []   I89.9 Unspecified Lymphatic Disorder []   I83.029 Venous Stasis Ulcer LE, Left   [x]   R60.9 Swelling not relieved by elevation [x]   Q82.0 Hereditary/ Congenital Lymphedema   []   C50.211 Malignant neoplasm of breast, Right []   G89.3  Cancer associated pain   []   C50.212 Malignant neoplasm of breast, Left []   L03.115 LE Cellulitis, Right   [x]  M25.519 Pain in unspecified shoulder  []   L03.116 LE Cellulitis, Left                                                           Kamala Win    1955  Page 2 of 2    Physician Order for DME for Diagnosis of Primary lymphedema (Q82.0), Swelling not relieved by elevation (R60.9), Pain in unspecified shoulder (M25.519) as Listed on Statement of Medical Necessity, Page 1        Recommended Product:  Units Upper Extremity Rt Lt Units Lower Extremity Rt Lt   2 Circ-Aid, Ready Wrap, Sigvaris Arm x x  Inelastic binders (Circ-Aid, July Martin)  []Foot   []Below Knee   []Knee   []Thigh      Circ-Aid Ready Wrap, Sigvaris hand    Christopher Dionisio, night use []Full Leg  []Lower Leg      Tribute Arm, night use    Tribute, night use  []Full Leg  []Lower Leg      Christopher Dionisio Arm, night use    Adis Sleeve Leg/ Foot, night use     8 Gradiant Compression Sleeves & Gloves  [x]Custom [x] RM Arm:  [x]CCL1 []CCL2 []CCL3  [x]Custom [x] RM Glove: [x]CCL1 []CCL2 []CCL3   x x  Gradient Compression Stockings   []Custom  []RM Lower Extremity:   []CCL1       []CCL2         []CCL3   []Knee       []Thigh        []Waist Length      Adis sleeve arm w/ hand, night use    Tribute Wrap, night use      Compression Bra         1 Compression Vest x x       The above patient was referred for treatment of Lymphedema due to the diagnosis indicated above. Lymphedema is a progressive and chronic condition. It may cause acute infections, muscle atrophy, discomfort, and connective tissue fibrosis with irreversible tissue damage, decreased ROM in the affected limb and diminished functional mobility possibly interfering with independence and ability to work. Recurrent infections and wound complications that commonly occur with Lymphedema often require hospitalization and extensive wound care, thus increasing medical costs. The patient has received complete decongestive therapy which includes manual lymphatic drainage, lymphedema specific exercises, compression bandaging, and hygiene/skin care. Goals of therapy are to reduce the edema and prevent re-accumulation of fluid with its complications. It is medically necessary for this patient to have daytime garments to control this condition. They will need 2 sets (one set to wear and one set to wash for adequate skin care and wearing time). Garments must be replaced every 4-6 months for effectiveness. There are no substitutes available that offer acceptable compression treatment for this Lymphedema patient. If further information is requested, please contact our certified lymphedema therapists at [phone].     [] Cyndi Coronado, PT, DPT, CLT-DOLORES  [] Emanuel Kilpatrick PT, CLT-DOLORES  [] Lalita Hardwick, PT, DPT, CLT-DOLORES  [x] Kulwant Negro PT, DPT, CLT    Printed  Provider Name Kristyn Stratton DO Provider Signature  Date    Provider NPI [id number]

## 2022-07-22 NOTE — PROGRESS NOTES
[address] Medicine Residency Attending Addendum:  Patient encounter was discussed on the day of the encounter with Murali Judge, DO, performing the key elements of the service. I discussed the findings, assessment and plan with the resident and agree with the resident's findings and plan as documented in the resident's note.       Mindy Navarro MD, CAQSM, RMSK

## 2022-07-29 ENCOUNTER — HOSPITAL ENCOUNTER (OUTPATIENT)
Dept: PHYSICAL THERAPY | Age: 67
Discharge: HOME | End: 2022-07-29
Payer: COMMERCIAL

## 2022-07-29 PROCEDURE — 97140 MANUAL THERAPY 1/> REGIONS: CPT

## 2022-07-29 PROCEDURE — 97110 THERAPEUTIC EXERCISES: CPT

## 2022-07-29 PROCEDURE — 97530 THERAPEUTIC ACTIVITIES: CPT

## 2022-07-29 NOTE — PROGRESS NOTES
LYMPHEDEMA PT DAILY TREATMENT NOTE - [facility] 2-15    Patient Name: Duane Madura  Date:2022  : 1955  [x]  Patient  Verified  Payor: Miltonkelsi Caden / Plan: [place] MED[place] PART [place] / Product Type: Medicare /    In AEID:[id number]  Out HYYV:[id number]  Total Treatment Time (min): 84  Total Timed Codes (min): 84  1:1 Treatment Time ([facility] only): 80   Visit #:  2  IE: 2022  PN:2022  Re-Evaluation: 10/13/2022  Treatment Area: Lymphedema, not elsewhere classified [I89.0]  Other specified soft tissue disorders [M79.89]    SUBJECTIVE  Pain Level (0-10 scale): 8/10 R shoulder with palpation, no facial grimace, no withdraw   Any medication changes, allergies to medications, adverse drug reactions, diagnosis change, or new procedure performed?: [x] No    [] Yes (see summary sheet for update)  Subjective functional status/changes:   [x] No changes reported  Pt reports she is ready to have BUEs bandaged. Pt and  report they would like to have her compression garments before the last week of September when they go on their vacation. Pt reports she has R shoulder pain with reaching behind her back, reaching overhead, and sleeping. She also notes she attempted to put on a shirt she used to wear regularly and the L arm sleeve was too tight to wear it, and the R was tight but could fit. Pt also notes her hands swell making it difficult to take off her rings, but sometimes her hands are not swollen and its easy to take her rings off. OBJECTIVE    8 min Therapeutic Exercise:  [] Cordie Genin Exercises [x] Remedial Lymphedema Exercise Program [] Axillary Web Exercise Program      [x] Shoulder ROM Exercises   Rationale: Activate muscle pump to improve lymphatic fluid movement and decrease swelling to improve the patients ability to perform ADL and IADL skills and prevent worsening of swelling over time. AROM shoulder flexion/ABD and elbow flexion/extension, wrist circles, overhead pec stretch.  Instructed to perform with compression donned during HEP. 30  55   Min  Min Manual Therapy  Therapeutic Activity  Kinesiotaping: Not performed       Manual Lymphatic Drainage (MLD):  Area to decongest: UE: bilateral palm wrist elbow mid forearm axilla   Sequence used and effectiveness: Secondary sequence for upper extremity with trunk involvement. Skin/wound care/debridement: Reviewed skin care principles:   Performed skin care with low pH lotion following manual lymph principles. Skin care products  Hygiene  Prevention of cellulitis   Applied multi-layer compression bandaging to: Patient arrived without adequate bandage in place that was applied by caregiver. bilateral  upper extremity    The following multi-layer bandages were applied:  Protouch Stockinette    Padding layer:  Cellona  Fleece    Foam:  10 cm roll    Short stretch bandages:   4 cm  6 cm  8 cm          Rationale: decrease pain, increase ROM, and decrease edema  to improve the patients ability to perform ADLs/IADLs without increased difficulty such as dressing. Improve tolerance for UE related activities and reduce fatigue due to increased limb volume. Vasopneumatic Device:    Body Part: [] R [] L [] Bilateral  Pressure: [] Decreased [] Normal [] Increased  Treatment Cycles: [] 1  [] 2  [] 3   Rationale: To improve lymphatic fluid movement and decrease swelling to improve the patients ability to perform ADL and IADL skills and prevent worsening of swelling over time. With   [x] TE   [x] TA   [] MT   [] SC   [] other: Patient Education: [x] Review HEP    [x] MLD Patient Education Reviewed with patient, as well as demonstration and instruction during MLD portion of the session. [] Progressed/Changed HEP based on:   [x] positioning   [] Kinesiotape   [x] Skin care   [] wound care   [] other:   [] Patient/caregiver in multi-layer bandaging donning principles. , Precautions. , and Handout provided. Patient / caregiver re-demonstrated bandaging.  [] Yes [x] No  Compression bandaging/garment precautions reviewed: [x] Yes  [] No     Other Objective/Functional Measures: LLIS 56/68 82%   Height:     Weight:      BMI:     (36 or greater: adversely affecting lymphedema)    Volumetric Measurements:   Right:  4215.99 mL Left:  4848.01mL   % Difference: 13.04    Dominance: R         Pain Level (0-10 scale) post treatment: 0/10 NPRS       ASSESSMENT/Changes in Function:   Pt tolerating MLB well during session without increased pain or discomfort noted by patient. Educated patient regarding compression bandage management, and safety principles related to multi-layered bandaging. Pt reporting 8/10 pain of RUE and demonstrates continued limitation of RUE internal rotation and flexion increasing difficulty with ADLs including dressing. Pt may benefit from dedicated intervention to address RUE ROM deficits. Visual inspection of limbs without noted wound or injury today. Pt encouraged to schedule appointments next week in order to promote continuity of care and UE compression to promote decongestion/limb volume reduction. Recommend continued MLB with remedial exercise performance in order to promote optimal limb volume reduction. Re-measuring limb volumes in 2-3 sessions. Patient will continue to benefit from skilled PT services to  address functional mobility deficits, address ROM deficits, address swelling, analyze compression product fit and use, assess and modify postural abnormalities, instruct in home lymphedema management program, measure for compression products, and modify and progress therapeutic interventions to attain remaining goals. []  See Plan of Care  []  See progress note/recertification  []  See Discharge Summary         Progress towards goals / Updated goals:  Short term goals  Time frame: 6 weeks  1. Patient will demonstrate knowledge of signs/symptoms of infections/cellulitis and be independent in skin care to prevent cellulitis.   2.  Patient will demonstrate independence in lymphedema home program of therapeutic exercises to improve circulation and decongest limb to improve ADLs. 3.  Patient will tolerate multi-layer bandages (MLB) and show measureable decrease in limb volume by 400ml  and 200ml to allow ordering of home compression system (daytime, nighttime garments and pump as needed). Long term goals  Time frame: 12 weeks  1. Pt will show improvement in the [facility] Lymphedema Assessment Scale by decreasing the score to 25/68 36%  and thus allow improvement in patient's quality of life. 2.  Patient will be independent with don/doff of compression system and use in order to prevent reaccumulation of fluid at discharge. 3.  Pt will be independent in self-MLD and show stable limb volumes showing decongestion and pt. ready for transition to independent restorative phase of lymphedema therapy. PLAN  []  Upgrade activities as tolerated     []  Continue plan of care  []  Update interventions per flow sheet       []  Discharge due to:_  [x]  Other: continue MLB BUEs, re-measure volumes 3 or 4th visit to assess response to MLB, provide print-out of UE ROM and lymphedema remedial exercises.      He Owusu, PT, DPT, CLT  7/29/2022

## 2022-08-01 ENCOUNTER — HOSPITAL ENCOUNTER (OUTPATIENT)
Dept: PHYSICAL THERAPY | Age: 67
Discharge: HOME | End: 2022-08-01
Payer: COMMERCIAL

## 2022-08-01 DIAGNOSIS — I89.0 LYMPHEDEMA OF BOTH UPPER EXTREMITIES: Primary | ICD-10-CM

## 2022-08-01 DIAGNOSIS — R19.00 SWELLING OF ABDOMEN: ICD-10-CM

## 2022-08-01 PROCEDURE — 97140 MANUAL THERAPY 1/> REGIONS: CPT

## 2022-08-01 PROCEDURE — 97530 THERAPEUTIC ACTIVITIES: CPT

## 2022-08-01 NOTE — PROGRESS NOTES
LYMPHEDEMA PT DAILY TREATMENT NOTE - [facility] 2-15    Patient Name: Saji Carrillo  Date:2022  : 1955  [x]  Patient  Verified  Payor: Deanna Bibgene / Plan: [address] / Product Type: PPO /    In time:1211  Out time:1310  Total Treatment Time (min): 59  Total Timed Codes (min): 59  1:1 Treatment Time ( [address] only): 61  Visit #:  3  IE: 2022  PN:2022  Re-Evaluation: 10/13/2022  Treatment Area: Lymphedema, not elsewhere classified [I89.0]  Other specified soft tissue disorders [M79.89]    SUBJECTIVE  Pain Level (0-10 scale): 8/10 R shoulder with palpation, no facial grimace, no withdraw   Any medication changes, allergies to medications, adverse drug reactions, diagnosis change, or new procedure performed?: [x] No    [] Yes (see summary sheet for update)  Subjective functional status/changes:   [x] No changes reported  Pt reports removing bandages just prior to coming in order to shower. C/o finger wraps coming off and asking to use Adrian Copper glove with MLB. No c/o with wearing b/l MLB except difficulty with finger wraps and fastening pants. Also c/o mild itching at wrist and elbow regions. Discussed a trial of coban to use for fingers to allow more flexibility with ADL's. Patient and spouse in agreement and agrees to proceed with treatment today. OBJECTIVE    0 min Therapeutic Exercise:  [] Aliyah Simper Exercises [x] Remedial Lymphedema Exercise Program [] Axillary Web Exercise Program      [x] Shoulder ROM Exercises:  As per prior appointment:  AROM shoulder flexion/ABD and elbow flexion/extension, wrist circles, overhead pec stretch. Instructed to perform with compression donned during HEP. Rationale: Activate muscle pump to improve lymphatic fluid movement and decrease swelling to improve the patients ability to perform ADL and IADL skills and prevent worsening of swelling over time.     35  27   Min  Min Manual Therapy  Therapeutic Activity  Kinesiotaping: Not performed       Manual Lymphatic Drainage (MLD):  Area to decongest: UE: bilateral palm wrist elbow mid forearm axilla   Sequence used and effectiveness: Secondary sequence for upper extremity with trunk involvement. Skin/wound care/debridement: Reviewed skin care principles:   Performed skin care with low pH lotion following manual lymph principles. Skin care products  Hygiene  Prevention of cellulitis  Used combo of Sarna and Eucerin cream   Applied multi-layer compression bandaging to: Patient arrived without adequate bandage in place that was applied by caregiver. Removed prior to appointment to shower  bilateral  upper extremity    The following multi-layer bandages were applied:  Protouch Stockinette  Fingers 1-5 B/L   Coban all fingers    Padding layer:  Cellona  Fleece    Foam:  10 cm roll    Short stretch bandages:   4 cm  6 cm  8 cm  Spiral technique with moderate tension B/L UE. Rationale: decrease pain, increase ROM, and decrease edema  to improve the patients ability to perform ADLs/IADLs without increased difficulty such as dressing. Improve tolerance for UE related activities and reduce fatigue due to increased limb volume. NP   Vasopneumatic Device:    Body Part: [] R [] L [] Bilateral  Pressure: [] Decreased [] Normal [] Increased  Treatment Cycles: [] 1  [] 2  [] 3   Rationale: To improve lymphatic fluid movement and decrease swelling to improve the patients ability to perform ADL and IADL skills and prevent worsening of swelling over time. With   [x] TE   [x] TA   [] MT   [] SC   [] other: Patient Education: [x] Review HEP    [x] MLD Patient Education Reviewed with patient, as well as demonstration and instruction during MLD portion of the session. [] Progressed/Changed HEP based on:   [x] positioning   [] Kinesiotape   [x] Skin care   [] wound care   [] other:   [] Patient/caregiver in multi-layer bandaging donning principles. , Precautions. , and Handout provided.    Patient / caregiver re-demonstrated bandaging. [] Yes  [x] No  Compression bandaging/garment precautions reviewed: [x] Yes  [] No     Other Objective/Functional Measures: LLIS 56/68 82%   Height:     Weight:      BMI:     (36 or greater: adversely affecting lymphedema)    Volumetric Measurements:   Right:  4215.99 mL Left:  4848.01mL   % Difference: 13.04    Dominance: R       Pain Level (0-10 scale) post treatment: 0/10 NPRS       ASSESSMENT/Changes in Function:   Pt tolerating MLB/MLD well during session without increased pain or discomfort noted by patient. Continued educatuation regarding compression bandage management especially with change in finger wraps from Transelast to Coban and safety principles related to multi-layered bandaging. RUE and demonstrates continued limitation of RUE internal rotation and flexion increasing difficulty with ADLs including dressing, and assitance by spouse. Pt may benefit from dedicated intervention to address RUE ROM deficits. Visual inspection of limbs without noted wound or injury today. Pt encouraged to schedule appointments next week in order to promote continuity of care and UE compression to promote decongestion/limb volume reduction. Recommend continued MLB with remedial exercise performance in order to promote optimal limb volume reduction. Re-measuring limb volumes in 2-3 sessions. Patient will continue to benefit from skilled PT services to  address functional mobility deficits, address ROM deficits, address swelling, analyze compression product fit and use, assess and modify postural abnormalities, instruct in home lymphedema management program, measure for compression products, and modify and progress therapeutic interventions to attain remaining goals. []  See Plan of Care  []  See progress note/recertification  []  See Discharge Summary         Progress towards goals / Updated goals:  Short term goals  Time frame: 6 weeks  1.   Patient will demonstrate knowledge of signs/symptoms of infections/cellulitis and be independent in skin care to prevent cellulitis. 2.  Patient will demonstrate independence in lymphedema home program of therapeutic exercises to improve circulation and decongest limb to improve ADLs. 3.  Patient will tolerate multi-layer bandages (MLB) and show measureable decrease in limb volume by 400ml  and 200ml to allow ordering of home compression system (daytime, nighttime garments and pump as needed). Long term goals  Time frame: 12 weeks  1. Pt will show improvement in the [facility] Lymphedema Assessment Scale by decreasing the score to 25/68 36%  and thus allow improvement in patient's quality of life. 2.  Patient will be independent with don/doff of compression system and use in order to prevent reaccumulation of fluid at discharge. 3.  Pt will be independent in self-MLD and show stable limb volumes showing decongestion and pt. ready for transition to independent restorative phase of lymphedema therapy. PLAN  []  Upgrade activities as tolerated     []  Continue plan of care  []  Update interventions per flow sheet       []  Discharge due to:_  [x]  Other: continue MLB BUEs, re-measure volumes 3 or 4th visit to assess response to MLB, provide print-out of UE ROM and lymphedema remedial exercises.      Brad Aguiar, GUMAROT, DPT, CLT  8/1/2022

## 2022-08-05 ENCOUNTER — HOSPITAL ENCOUNTER (OUTPATIENT)
Dept: PHYSICAL THERAPY | Age: 67
Discharge: HOME | End: 2022-08-05
Payer: COMMERCIAL

## 2022-08-05 PROCEDURE — 97140 MANUAL THERAPY 1/> REGIONS: CPT

## 2022-08-05 PROCEDURE — 97110 THERAPEUTIC EXERCISES: CPT

## 2022-08-05 PROCEDURE — 97530 THERAPEUTIC ACTIVITIES: CPT

## 2022-08-05 NOTE — PROGRESS NOTES
LYMPHEDEMA PT DAILY TREATMENT NOTE - [facility]    Patient Name: Haseeb Son  Date:2022  : 1955  [x]  Patient  Verified  Payor: Jolynn Burton / Plan: [address] / Product Type: PPO /    In time:1248  Out time:1407  Total Treatment Time (min): 79  Total Timed Codes (min): 79  1:1 Treatment Time ( [address] only): 78  Visit #:  4  IE: 2022  PN:2022  Re-Evaluation: 10/13/2022  Treatment Area: Lymphedema, not elsewhere classified [I89.0]  Other specified soft tissue disorders [M79.89]    SUBJECTIVE  Pain Level (0-10 scale): 810 R shoulder with palpation, no facial grimace, no withdraw   Any medication changes, allergies to medications, adverse drug reactions, diagnosis change, or new procedure performed?: [x] No    [] Yes (see summary sheet for update)  Subjective functional status/changes:   [x] No changes reported  Pt reports the new finger wraps worked much better and she would like to continue with those. Pt also reports her bandages slipped down a little on R side. She would like to know when she can start to look forward to getting compression garments. OBJECTIVE    8 min Therapeutic Exercise:  [] Mozella Nicholson Exercises [x] Remedial Lymphedema Exercise Program [] Axillary Web Exercise Program      [x] Shoulder ROM Exercises:  As per prior appointment:  AROM shoulder flexion/ABD and elbow flexion/extension, wrist circles, overhead pec stretch. Instructed to perform with compression donned during HEP. Given handout. Rationale: Activate muscle pump to improve lymphatic fluid movement and decrease swelling to improve the patients ability to perform ADL and IADL skills and prevent worsening of swelling over time.     32  40   Min  Min Manual Therapy  Therapeutic Activity  Kinesiotaping: Not performed       Manual Lymphatic Drainage (MLD):  Area to decongest: UE: bilateral palm wrist elbow mid forearm axilla   Sequence used and effectiveness: Secondary sequence for upper extremity with trunk involvement. Skin/wound care/debridement: Reviewed skin care principles:   Performed skin care with low pH lotion following manual lymph principles. Skin care products  Hygiene  Prevention of cellulitis  Used combo of Sarna and Eucerin cream   Applied multi-layer compression bandaging to: Patient arrived without adequate bandage in place that was applied by caregiver. Removed prior to appointment to shower  bilateral  upper extremity    The following multi-layer bandages were applied:  Tg soft  Fingers 1-5 B/L   [place]an all fingers    Padding layer:  Cellona  Fleece    Foam:  10 cm roll    Short stretch bandages:   4 cm  6 cm  8 cm  Spiral technique with moderate tension B/L UE. Rationale: decrease pain, increase ROM, and decrease edema  to improve the patients ability to perform ADLs/IADLs without increased difficulty such as dressing. Improve tolerance for UE related activities and reduce fatigue due to increased limb volume. NP   Vasopneumatic Device:    Body Part: [] R [] L [] Bilateral  Pressure: [] Decreased [] Normal [] Increased  Treatment Cycles: [] 1  [] 2  [] 3   Rationale: To improve lymphatic fluid movement and decrease swelling to improve the patients ability to perform ADL and IADL skills and prevent worsening of swelling over time. With   [x] TE   [x] TA   [x] MT   [] SC   [] other: Patient Education: [x] Review HEP    [x] MLD Patient Education Reviewed with patient, as well as demonstration and instruction during MLD portion of the session. [] Progressed/Changed HEP based on:   [x] positioning   [] Kinesiotape   [x] Skin care   [] wound care   [] other:   [] Patient/caregiver in multi-layer bandaging donning principles. , Precautions. , and Handout provided. Patient / caregiver re-demonstrated bandaging.  [] Yes  [x] No  Compression bandaging/garment precautions reviewed: [x] Yes  [] No     Other Objective/Functional Measures: LLIS 56/68 82%   Height:     Weight:      BMI: (36 or greater: adversely affecting lymphedema)    Volumetric Measurements:   Right:  4215.99 mL Left:  4848.01mL   % Difference: 13.04    Dominance: R       Pain Level (0-10 scale) post treatment: 0/10 NPRS       ASSESSMENT/Changes in Function:   Introduced BUE ROM and lymphatic remedial exercises to address ROM deficits as well as limb volume reduction with compression donned.  present, and patient given handout detailing exercises to promote retention and home performance. Pt tolerating MLB well per reports. Increased height of bandaging into axilla to reduce slippage between sessions. Visual inspection of limbs without noted wound or injury today. Recommend continued MLB with remedial exercise performance in order to promote optimal limb volume reduction. Re-measuring limb volumes next session. Patient will continue to benefit from skilled PT services to  address functional mobility deficits, address ROM deficits, address swelling, analyze compression product fit and use, assess and modify postural abnormalities, instruct in home lymphedema management program, measure for compression products, and modify and progress therapeutic interventions to attain remaining goals. [x]  See Plan of Care  []  See progress note/recertification  []  See Discharge Summary         Progress towards goals / Updated goals:  Short term goals  Time frame: 6 weeks  1. Patient will demonstrate knowledge of signs/symptoms of infections/cellulitis and be independent in skin care to prevent cellulitis. 2.  Patient will demonstrate independence in lymphedema home program of therapeutic exercises to improve circulation and decongest limb to improve ADLs. 3.  Patient will tolerate multi-layer bandages (MLB) and show measureable decrease in limb volume by 400ml  and 200ml to allow ordering of home compression system (daytime, nighttime garments and pump as needed). Long term goals  Time frame: 12 weeks  1.   Pt will show improvement in the LLIS by decreasing the score to 25/68 36%  and thus allow improvement in patient's quality of life. 2.  Patient will be independent with don/doff of compression system and use in order to prevent reaccumulation of fluid at discharge. 3.  Pt will be independent in self-MLD and show stable limb volumes showing decongestion and pt. ready for transition to independent restorative phase of lymphedema therapy.     PLAN  []  Upgrade activities as tolerated     []  Continue plan of care  []  Update interventions per flow sheet       []  Discharge due to:_  [x]  Other: continue MLB BUEs, re-measure volumes next session    Alfredo Fields, PT, DPT, CLT  8/5/2022

## 2022-08-09 ENCOUNTER — HOSPITAL ENCOUNTER (OUTPATIENT)
Dept: PHYSICAL THERAPY | Age: 67
Discharge: HOME | End: 2022-08-09
Payer: COMMERCIAL

## 2022-08-09 PROCEDURE — 97530 THERAPEUTIC ACTIVITIES: CPT

## 2022-08-09 PROCEDURE — 97140 MANUAL THERAPY 1/> REGIONS: CPT

## 2022-08-09 NOTE — PROGRESS NOTES
LYMPHEDEMA PT DAILY TREATMENT NOTE - [facility] 2-15    Patient Name: Cristian Romero  Date:2022  : 1955  [x]  Patient  Verified  Payor: Shabanareese Jose / Plan:  [facility] / Product Type: PPO /    In time:1245  Out time:1416  Total Treatment Time (min): 91  Total Timed Codes (min): 91  1:1 Treatment Time ( [address] only): 91  Visit #:  5  IE: 2022  PN:2022  Re-Evaluation: 10/13/2022  Treatment Area: Lymphedema, not elsewhere classified [I89.0]  Other specified soft tissue disorders [M79.89]    SUBJECTIVE  Pain Level (0-10 scale): 8/10 R shoulder with palpation, no facial grimace, no withdraw   Any medication changes, allergies to medications, adverse drug reactions, diagnosis change, or new procedure performed?: [x] No    [] Yes (see summary sheet for update)  Subjective functional status/changes:   [x] No changes reported  Pt reports irritation and rubbing at outside of R pinky due to bandages getting wet when she washes her hands and then feeling tight after while they are drying. She reports it is very uncomfortable, and her  typically has to re-wrap her arms a couple times before she returns for follow up appointments. She also reports finger wrapping are not staying on either and they bunch up. She reports staying on top of her exercises performing them as prescribed. OBJECTIVE     min Therapeutic Exercise:  [] Kika Floras Exercises [x] Remedial Lymphedema Exercise Program [] Axillary Web Exercise Program      [x] Shoulder ROM Exercises:  As per prior appointment:  AROM shoulder flexion/ABD and elbow flexion/extension, wrist circles, overhead pec stretch. Instructed to perform with compression donned during HEP. Given handout. Rationale: Activate muscle pump to improve lymphatic fluid movement and decrease swelling to improve the patients ability to perform ADL and IADL skills and prevent worsening of swelling over time.     [address] Manual Therapy  Therapeutic Activity  Kinesiotaping: Not performed       Manual Lymphatic Drainage (MLD):  Area to decongest: UE: bilateral palm wrist elbow mid forearm axilla   Sequence used and effectiveness: Secondary sequence for upper extremity with trunk involvement. Skin/wound care/debridement: Reviewed skin care principles:   Performed skin care with low pH lotion following manual lymph principles. Skin care products  Hygiene  Prevention of cellulitis  Used combo of Sarna and Eucerin cream   Applied multi-layer compression bandaging to: Patient arrived without adequate bandage in place that was applied by caregiver. Removed prior to appointment to shower  bilateral  upper extremity    The following multi-layer bandages were applied:  Tg soft  Deferred finger wraps today    Padding layer:  Cellona  Fleece    Foam:  10 cm roll    Short stretch bandages:   4 cm  6 cm  8 cm  10 cm  Spiral technique with moderate tension B/L UE. Rationale: decrease pain, increase ROM, and decrease edema  to improve the patients ability to perform ADLs/IADLs without increased difficulty such as dressing. Improve tolerance for UE related activities and reduce fatigue due to increased limb volume. NP   Vasopneumatic Device:    Body Part: [] R [] L [] Bilateral  Pressure: [] Decreased [] Normal [] Increased  Treatment Cycles: [] 1  [] 2  [] 3   Rationale: To improve lymphatic fluid movement and decrease swelling to improve the patients ability to perform ADL and IADL skills and prevent worsening of swelling over time. With   [x] TE   [x] TA   [x] MT   [] SC   [] other: Patient Education: [x] Review HEP    [x] MLD Patient Education Reviewed with patient, as well as demonstration and instruction during MLD portion of the session. [] Progressed/Changed HEP based on:   [x] positioning   [] Kinesiotape   [x] Skin care   [] wound care   [] other:   [] Patient/caregiver in multi-layer bandaging donning principles. , Precautions. , and Handout provided. Patient / caregiver re-demonstrated bandaging. [] Yes  [x] No  Compression bandaging/garment precautions reviewed: [x] Yes  [] No     Other Objective/Functional Measures: LLIS 56/68 82% impairment  Height:     Weight:      BMI:     (36 or greater: adversely affecting lymphedema)    Volumetric Measurements:   8/9/2022  Right:  5000. 21 mL Left:  4737.27mL   % Difference: 14.21    Dominance: R     IE  Right:  4215.99 mL Left:  4848.01mL   % Difference: 13.04    Dominance: R       Pain Level (0-10 scale) post treatment: 0/10 NPRS       ASSESSMENT/Changes in Function:   Assessed volumetric measures with reduction in B limb volumes R>L noted today. Additional short stretch bandage added for improved upper arm containment, and reduce slippage to reduce likelihood of need for patient's spouse to reapply bandages between sessions. Multiple attempts required in session to address discomfort with MLB at distal UE notably at R hand portion completed during session. Adequate coverage of fleece at R hand and wrist achieved after multiple attempts with patient reporting improved comfort. Recommended leaving UE bandaging in place as long as possible without risk for insult to integument, or causing pain in order to ensure consistency of compression between sessions. May consider proposing 3x/week visits if patient reports she is unable to tolerate MLB prolonged periods between sessions. Patient will continue to benefit from skilled PT services to  address functional mobility deficits, address ROM deficits, address swelling, analyze compression product fit and use, assess and modify postural abnormalities, instruct in home lymphedema management program, measure for compression products, and modify and progress therapeutic interventions to attain remaining goals.      [x]  See Plan of Care  []  See progress note/recertification  []  See Discharge Summary         Progress towards goals / Updated goals:  Short term goals  Time frame: 6 weeks  1. Patient will demonstrate knowledge of signs/symptoms of infections/cellulitis and be independent in skin care to prevent cellulitis. 2.  Patient will demonstrate independence in lymphedema home program of therapeutic exercises to improve circulation and decongest limb to improve ADLs. 3.  Patient will tolerate multi-layer bandages (MLB) and show measureable decrease in limb volume by 400ml  and 200ml to allow ordering of home compression system (daytime, nighttime garments and pump as needed). Long term goals  Time frame: 12 weeks  1. Pt will show improvement in the LLIS by decreasing the score to 25/68 36%  and thus allow improvement in patient's quality of life. 2.  Patient will be independent with don/doff of compression system and use in order to prevent reaccumulation of fluid at discharge. 3.  Pt will be independent in self-MLD and show stable limb volumes showing decongestion and pt. ready for transition to independent restorative phase of lymphedema therapy.     PLAN  []  Upgrade activities as tolerated     []  Continue plan of care  []  Update interventions per flow sheet       []  Discharge due to:_  [x]  Other: continue MLB BUEs, assess tolerance for compression, monitor R MCP joint for irritation from bandages    Dennie Sally, PT, DPT, CLT  8/9/2022

## 2022-08-12 ENCOUNTER — HOSPITAL ENCOUNTER (OUTPATIENT)
Dept: PHYSICAL THERAPY | Age: 67
Discharge: HOME | End: 2022-08-12
Payer: COMMERCIAL

## 2022-08-12 PROCEDURE — 97530 THERAPEUTIC ACTIVITIES: CPT

## 2022-08-12 PROCEDURE — 97140 MANUAL THERAPY 1/> REGIONS: CPT

## 2022-08-12 NOTE — PROGRESS NOTES
LYMPHEDEMA PT DAILY TREATMENT NOTE - [facility] 2-15    Patient Name: Jitendra Loera  Date:2022  : 1955  [x]  Patient  Verified  Payor: Dawood Schulz / Plan:  [facility] / Product Type: PPO /    In time:1235  Out time:1400  Total Treatment Time (min): 85  Total Timed Codes (min): 85  1:1 Treatment Time ( [address] only): 85  Visit #:  6  IE: 2022  PN:2022  Re-Evaluation: 10/13/2022  Treatment Area: Lymphedema, not elsewhere classified [I89.0]  Other specified soft tissue disorders [M79.89]    SUBJECTIVE  Pain Level (0-10 scale): 8/10 R shoulder with palpation, no facial grimace, no withdraw   Any medication changes, allergies to medications, adverse drug reactions, diagnosis change, or new procedure performed?: [x] No    [] Yes (see summary sheet for update)  Subjective functional status/changes:   [x] No changes reported  Pt reports irritation and itching antecubital fossa. She arrives with MLB removed for shower prior to treatment. States she prefers not to wrap fingers. Reports MLB cab unravel at hand. She reports staying on top of her exercises performing them as prescribed. Patient agreeable to proceeding with treatment. OBJECTIVE     min Therapeutic Exercise:  [] General Sago Exercises [x] Remedial Lymphedema Exercise Program [] Axillary Web Exercise Program      [x] Shoulder ROM Exercises:  As per prior appointment:  AROM shoulder flexion/ABD and elbow flexion/extension, wrist circles, overhead pec stretch. Instructed to perform with compression donned during HEP. Given handout. Rationale: Activate muscle pump to improve lymphatic fluid movement and decrease swelling to improve the patients ability to perform ADL and IADL skills and prevent worsening of swelling over time.     39  40   Min  Min Manual Therapy  Therapeutic Activity  Kinesiotaping: Not performed       Manual Lymphatic Drainage (MLD):  Area to decongest: UE: bilateral palm wrist elbow mid forearm axilla   Sequence used and effectiveness: Secondary sequence for upper extremity with trunk involvement. Full sequence bilateral UE. Stim bilateral axillary nodes due to no known insult to nodes. Stim perforating precollector parasternal/intercostal.   Skin/wound care/debridement: Reviewed skin care principles:   Performed skin care with low pH lotion following manual lymph principles. Skin care products  Hygiene  Prevention of cellulitis  Used combo of Sarna and Eucerin cream  Trial of Calmoceptine antecubital fossa bilateral.   Applied multi-layer compression bandaging to: Patient arrived without adequate bandage in place that was applied by caregiver. Removed prior to appointment to shower  bilateral  upper extremity    The following multi-layer bandages were applied:  Protouch stockinette  Deferred finger wraps today    Padding layer:  Cellona  Fleece    Foam:  10 cm roll    Short stretch bandages:   6 cm  8 cm  10 cm x 2--initial bandage applied using herringbone technique upper arm; second bandaging spiral technique  Spiral technique with moderate tension B/L UE    Tubgrip applied over hand to prevent unraveling of MLB, and over tape proximal aspect of UE. Rationale: decrease pain, increase ROM, and decrease edema  to improve the patients ability to perform ADLs/IADLs without increased difficulty such as dressing. Improve tolerance for UE related activities and reduce fatigue due to increased limb volume. NP   Vasopneumatic Device:    Body Part: [] R [] L [] Bilateral  Pressure: [] Decreased [] Normal [] Increased  Treatment Cycles: [] 1  [] 2  [] 3   Rationale: To improve lymphatic fluid movement and decrease swelling to improve the patients ability to perform ADL and IADL skills and prevent worsening of swelling over time.        With   [x] TE   [x] TA   [x] MT   [] SC   [] other: Patient Education: [x] Review HEP    [x] MLD Patient Education Reviewed with patient, as well as demonstration and instruction during MLD portion of the session. [] Progressed/Changed HEP based on:   [x] positioning   [] Kinesiotape   [x] Skin care   [] wound care   [] other:   [] Patient/caregiver in multi-layer bandaging donning principles. , Precautions. , and Handout provided. Patient / caregiver re-demonstrated bandaging. [] Yes  [x] No  Compression bandaging/garment precautions reviewed: [x] Yes  [] No     Other Objective/Functional Measures: LLIS 56/68 82% impairment  Height:     Weight:      BMI:     (36 or greater: adversely affecting lymphedema)    Volumetric Measurements:   8/9/2022  Right:  0256. 21 mL Left:  4737.27mL   % Difference: 14.21    Dominance: R     IE  Right:  4215.99 mL Left:  4848.01mL   % Difference: 13.04    Dominance: R     Affected Side: Bilateral UE, L>R   Left (cm) Right (cm)   Base 3rd finger             Palm 19.2    19.8      Wrist 18    19      Mid Forearm 27.3    26.2      Elbow 31.8    28.7      Axilla 39.5    38.5      Length                  Pain Level (0-10 scale) post treatment: 0/10 NPRS       ASSESSMENT/Changes in Function:   Assessed volumetric measures previous visit with reduction in B limb volumes R>L noted today. Additional short stretch bandage added for improved upper arm containment, and reduce slippage to reduce likelihood of need for patient's spouse to reapply bandages between sessions. Note girth measurements above. Patient reports MLB comfortable after application, adding herringbone technique with first 10 cm bandage in attempt to reduce slippage of MLB, upper arm being more problematic area for patient. Recommended leaving UE bandaging in place as long as possible without risk for insult to integument, or causing pain in order to ensure consistency of compression between sessions. May consider proposing 3x/week visits if patient reports she is unable to tolerate MLB prolonged periods between sessions.      Patient will continue to benefit from skilled PT services to  address functional mobility deficits, address ROM deficits, address swelling, analyze compression product fit and use, assess and modify postural abnormalities, instruct in home lymphedema management program, measure for compression products, and modify and progress therapeutic interventions to attain remaining goals. [x]  See Plan of Care  []  See progress note/recertification  []  See Discharge Summary         Progress towards goals / Updated goals:  Short term goals  Time frame: 6 weeks  1. Patient will demonstrate knowledge of signs/symptoms of infections/cellulitis and be independent in skin care to prevent cellulitis. 2.  Patient will demonstrate independence in lymphedema home program of therapeutic exercises to improve circulation and decongest limb to improve ADLs. 8/12/2022 Patient reports good tolerance of HEP. 3.  Patient will tolerate multi-layer bandages (MLB) and show measureable decrease in limb volume by 400ml  and 200ml to allow ordering of home compression system (daytime, nighttime garments and pump as needed). 8/12/2022 ongoing     Long term goals  Time frame: 12 weeks  1. Pt will show improvement in the LLIS by decreasing the score to 25/68 36%  and thus allow improvement in patient's quality of life. 2.  Patient will be independent with don/doff of compression system and use in order to prevent reaccumulation of fluid at discharge. 3.  Pt will be independent in self-MLD and show stable limb volumes showing decongestion and pt. ready for transition to independent restorative phase of lymphedema therapy.     PLAN  []  Upgrade activities as tolerated     [x]  Continue plan of care  []  Update interventions per flow sheet       []  Discharge due to:_  [x]  Other: continue MLB BUEs, assess tolerance for compression, monitor R MCP joint for irritation from bandages    Henok Braden, PT, [facility]  8/12/2022

## 2022-08-16 ENCOUNTER — HOSPITAL ENCOUNTER (OUTPATIENT)
Dept: PHYSICAL THERAPY | Age: 67
Discharge: HOME | End: 2022-08-16
Payer: COMMERCIAL

## 2022-08-16 PROCEDURE — 97140 MANUAL THERAPY 1/> REGIONS: CPT

## 2022-08-16 PROCEDURE — 97530 THERAPEUTIC ACTIVITIES: CPT

## 2022-08-16 NOTE — PROGRESS NOTES
LYMPHEDEMA PT DAILY TREATMENT NOTE - [facility] 2-15    Patient Name: Sonia Pradhan  Date:2022  : 1955  [x]  Patient  Verified  Payor: Shawna Garland / Plan:  [facility] / Product Type: PPO /    In time:1245  Out time:1433  Total Treatment Time (min): 108  Total Timed Codes (min): 108  1:1 Treatment Time ( [address] only): 108  Visit #:  7  IE: 2022  PN:2022  Re-Evaluation: 10/13/2022    Treatment Area: Primary lymphedema (Q82.0), Swelling not relieved by elevation (R60.9), Pain in unspecified shoulder (M25.519)    SUBJECTIVE  Pain Level (0-10 scale): No reports of pain today  Any medication changes, allergies to medications, adverse drug reactions, diagnosis change, or new procedure performed?: [x] No    [] Yes (see summary sheet for update)  Subjective functional status/changes:   [x] No changes reported  Pt reports bandaging stayed on well, and she had no difficulty with itching. Her  did not have to re-wrap her arm at all between sessions. She also reports she felt like the fluid was moved into her shoulder and out of her arm because she noticed increased size of her shoulder. OBJECTIVE     min Therapeutic Exercise:  [] [place] Exercises [x] Remedial Lymphedema Exercise Program [] Axillary Web Exercise Program      [x] Shoulder ROM Exercises:  As per prior appointment:  AROM shoulder flexion/ABD and elbow flexion/extension, wrist circles, overhead pec stretch. Instructed to perform with compression donned during HEP. Given handout. Rationale: Activate muscle pump to improve lymphatic fluid movement and decrease swelling to improve the patients ability to perform ADL and IADL skills and prevent worsening of swelling over time.     37  65   Min  Min Manual Therapy  Therapeutic Activity  Kinesiotaping: Not performed       Manual Lymphatic Drainage (MLD):  Area to decongest: UE: bilateral palm wrist elbow mid forearm axilla   Sequence used and effectiveness: Secondary sequence for upper extremity with trunk involvement. Full sequence bilateral UE. Stim bilateral axillary nodes due to no known insult to nodes. Skin/wound care/debridement: Reviewed skin care principles:   Performed skin care with low pH lotion following manual lymph principles. Skin care products  Hygiene  Prevention of cellulitis  Used combo of Sarna and Eucerin cream  Continued Calmoceptine antecubital fossa bilateral.   Applied multi-layer compression bandaging to: Patient arrived without adequate bandage in place that was applied by caregiver. Removed prior to appointment to shower  bilateral  upper extremity    The following multi-layer bandages were applied:  Protouch stockinette  Deferred finger wraps today    Padding layer:  Cellona  Fleece    Foam:  10 cm roll    Short stretch bandages:   6 cm  8 cm  10 cm x 2--continued initial bandage applied using herringbone technique upper arm; second bandaging spiral technique  Spiral technique with moderate tension B/L UE        Rationale: decrease pain, increase ROM, and decrease edema  to improve the patients ability to perform ADLs/IADLs without increased difficulty such as dressing. Improve tolerance for UE related activities and reduce fatigue due to increased limb volume. NP   Vasopneumatic Device:    Body Part: [] R [] L [] Bilateral  Pressure: [] Decreased [] Normal [] Increased  Treatment Cycles: [] 1  [] 2  [] 3   Rationale: To improve lymphatic fluid movement and decrease swelling to improve the patients ability to perform ADL and IADL skills and prevent worsening of swelling over time. With   [] TE   [x] TA   [x] MT   [] SC   [] other: Patient Education: [x] Review HEP    [x] MLD Patient Education Reviewed with patient, as well as demonstration and instruction during MLD portion of the session.    [] Progressed/Changed HEP based on:   [x] positioning   [] Kinesiotape   [x] Skin care   [] wound care   [] other:   [] Patient/caregiver in multi-layer bandaging donning principles. , Precautions. , and Handout provided. Patient / caregiver re-demonstrated bandaging. [] Yes  [x] No  Compression bandaging/garment precautions reviewed: [x] Yes  [] No     Other Objective/Functional Measures: LLIS 56/68 82% impairment  Height:     Weight:      BMI:     (36 or greater: adversely affecting lymphedema)    Volumetric Measurements:     Volumes:  Date Right (mL) Left (mL) Volume difference %  Difference   08/16/22 3720.54 4333.59 613.05 14.15   08/09/22 4064.21 4737.27 673.06 14.21   07/21/22 4215.99 4848.01 632.02 13.04         Pain Level (0-10 scale) post treatment: 0/10 NPRS       ASSESSMENT/Changes in Function:  Re-measured volumes again today during session noting improved reduction of approximately 500mL B from initial evaluation. Pt and  will be leaving for vacation in one month and will need compression garment at that time as wrapping while traveling is not an option for them. Will measure for custom flat knit sleeve next session and complete progress note to promote retention of benefits made thus far, and improve long term maintenance. Reduction improved with continuous compression between sessions and reduced need for  re-wrapping compared to previous sessions when patient requires multiple re-wraps by  between sessions. Encouraged patient to adhere to compression between sessions and limit re-wrapping between sessions. Patient will continue to benefit from skilled PT services to  address functional mobility deficits, address ROM deficits, address swelling, analyze compression product fit and use, assess and modify postural abnormalities, instruct in home lymphedema management program, measure for compression products, and modify and progress therapeutic interventions to attain remaining goals.      [x]  See Plan of Care  []  See progress note/recertification  []  See Discharge Summary         Progress towards goals / Updated goals:  Short term goals  Time frame: 6 weeks  1. Patient will demonstrate knowledge of signs/symptoms of infections/cellulitis and be independent in skin care to prevent cellulitis. 2.  Patient will demonstrate independence in lymphedema home program of therapeutic exercises to improve circulation and decongest limb to improve ADLs. 8/12/2022 Patient reports good tolerance of HEP. 3.  Patient will tolerate multi-layer bandages (MLB) and show measureable decrease in limb volume by 400ml  and 200ml to allow ordering of home compression system (daytime, nighttime garments and pump as needed). 8/12/2022 ongoing     Long term goals  Time frame: 12 weeks  1. Pt will show improvement in the LLIS by decreasing the score to 25/68 36%  and thus allow improvement in patient's quality of life. ONGOING  2. Patient will be independent with don/doff of compression system and use in order to prevent reaccumulation of fluid at discharge. ONGOING  3. Pt will be independent in self-MLD and show stable limb volumes showing decongestion and pt. ready for transition to independent restorative phase of lymphedema therapy.  ONGOING    PLAN  []  Upgrade activities as tolerated     [x]  Continue plan of care  []  Update interventions per flow sheet       []  Discharge due to:_  [x]  Other: progress note next session, re-measure volumes, LLIS, measure for compression sleeves    Samira Lowe, PT, DPT, CLT  8/16/2022

## 2022-08-18 ENCOUNTER — OFFICE VISIT (OUTPATIENT)
Dept: FAMILY MEDICINE CLINIC | Age: 67
End: 2022-08-18
Payer: COMMERCIAL

## 2022-08-18 VITALS
RESPIRATION RATE: 18 BRPM | HEIGHT: 69 IN | WEIGHT: 224 LBS | SYSTOLIC BLOOD PRESSURE: 139 MMHG | TEMPERATURE: 98.3 F | OXYGEN SATURATION: 97 % | BODY MASS INDEX: 33.18 KG/M2 | HEART RATE: 73 BPM | DIASTOLIC BLOOD PRESSURE: 89 MMHG

## 2022-08-18 DIAGNOSIS — R30.0 DYSURIA: Primary | ICD-10-CM

## 2022-08-18 LAB
BILIRUB UR QL STRIP: NEGATIVE
GLUCOSE UR-MCNC: NEGATIVE MG/DL
KETONES P FAST UR STRIP-MCNC: NEGATIVE MG/DL
PH UR STRIP: 7 [PH] (ref 4.6–8)
PROT UR QL STRIP: NEGATIVE
SP GR UR STRIP: 1.01 (ref 1–1.03)
UA UROBILINOGEN AMB POC: NORMAL (ref 0.2–1)
URINALYSIS CLARITY POC: NORMAL
URINALYSIS COLOR POC: YELLOW
URINE BLOOD POC: NEGATIVE
URINE LEUKOCYTES POC: NORMAL
URINE NITRITES POC: NEGATIVE

## 2022-08-18 PROCEDURE — G8752 SYS BP LESS 140: HCPCS | Performed by: STUDENT IN AN ORGANIZED HEALTH CARE EDUCATION/TRAINING PROGRAM

## 2022-08-18 PROCEDURE — G9899 SCRN MAM PERF RSLTS DOC: HCPCS | Performed by: STUDENT IN AN ORGANIZED HEALTH CARE EDUCATION/TRAINING PROGRAM

## 2022-08-18 PROCEDURE — 1123F ACP DISCUSS/DSCN MKR DOCD: CPT | Performed by: STUDENT IN AN ORGANIZED HEALTH CARE EDUCATION/TRAINING PROGRAM

## 2022-08-18 PROCEDURE — 1101F PT FALLS ASSESS-DOCD LE1/YR: CPT | Performed by: STUDENT IN AN ORGANIZED HEALTH CARE EDUCATION/TRAINING PROGRAM

## 2022-08-18 PROCEDURE — 81003 URINALYSIS AUTO W/O SCOPE: CPT | Performed by: STUDENT IN AN ORGANIZED HEALTH CARE EDUCATION/TRAINING PROGRAM

## 2022-08-18 PROCEDURE — G8427 DOCREV CUR MEDS BY ELIG CLIN: HCPCS | Performed by: STUDENT IN AN ORGANIZED HEALTH CARE EDUCATION/TRAINING PROGRAM

## 2022-08-18 PROCEDURE — G8754 DIAS BP LESS 90: HCPCS | Performed by: STUDENT IN AN ORGANIZED HEALTH CARE EDUCATION/TRAINING PROGRAM

## 2022-08-18 PROCEDURE — 3017F COLORECTAL CA SCREEN DOC REV: CPT | Performed by: STUDENT IN AN ORGANIZED HEALTH CARE EDUCATION/TRAINING PROGRAM

## 2022-08-18 PROCEDURE — G9717 DOC PT DX DEP/BP F/U NT REQ: HCPCS | Performed by: STUDENT IN AN ORGANIZED HEALTH CARE EDUCATION/TRAINING PROGRAM

## 2022-08-18 PROCEDURE — G8536 NO DOC ELDER MAL SCRN: HCPCS | Performed by: STUDENT IN AN ORGANIZED HEALTH CARE EDUCATION/TRAINING PROGRAM

## 2022-08-18 PROCEDURE — 99213 OFFICE O/P EST LOW 20 MIN: CPT | Performed by: STUDENT IN AN ORGANIZED HEALTH CARE EDUCATION/TRAINING PROGRAM

## 2022-08-18 PROCEDURE — G8417 CALC BMI ABV UP PARAM F/U: HCPCS | Performed by: STUDENT IN AN ORGANIZED HEALTH CARE EDUCATION/TRAINING PROGRAM

## 2022-08-18 PROCEDURE — 1090F PRES/ABSN URINE INCON ASSESS: CPT | Performed by: STUDENT IN AN ORGANIZED HEALTH CARE EDUCATION/TRAINING PROGRAM

## 2022-08-18 PROCEDURE — G8400 PT W/DXA NO RESULTS DOC: HCPCS | Performed by: STUDENT IN AN ORGANIZED HEALTH CARE EDUCATION/TRAINING PROGRAM

## 2022-08-18 RX ORDER — CEPHALEXIN 500 MG/1
500 CAPSULE ORAL 4 TIMES DAILY
Qty: 28 CAPSULE | Refills: 0 | Status: SHIPPED | OUTPATIENT
Start: 2022-08-18 | End: 2022-08-25

## 2022-08-18 NOTE — PROGRESS NOTES
Identified Patient with two Patient identifiers (Name and ). Two Patient Identifiers confirmed. Reviewed record in preparation for visit and have obtained necessary documentation. Chief Complaint   Patient presents with    Dysuria     And urgency/retention since this weekend. Visit Vitals  /89 (BP 1 Location: Right leg, BP Patient Position: Sitting, BP Cuff Size: Large adult)   Pulse 73   Temp 98.3 °F (36.8 °C) (Oral)   Resp 18   Ht 5' 9\" (1.753 m)   Wt 224 lb (101.6 kg)   SpO2 97%   BMI 33.08 kg/m²       1. Have you been to the ER, urgent care clinic since your last visit? Hospitalized since your last visit? No    2. Have you seen or consulted any other health care providers outside of the [address] [place] [address] since your last visit? Include any pap smears or colon screening.  No Problem: Prexisting or High Potential for Compromised Skin Integrity  Goal: Skin integrity is maintained or improved  Description: INTERVENTIONS:  - Identify patients at risk for skin breakdown  - Assess and monitor skin integrity  - Assess and monitor nutrition and hydration status  - Monitor labs   - Assess for incontinence   - Turn and reposition patient  - Assist with mobility/ambulation  - Relieve pressure over bony prominences  - Avoid friction and shearing  - Provide appropriate hygiene as needed including keeping skin clean and dry  - Evaluate need for skin moisturizer/barrier cream  - Collaborate with interdisciplinary team   - Patient/family teaching  - Consider wound care consult   Outcome: Progressing     Problem: PAIN - ADULT  Goal: Verbalizes/displays adequate comfort level or baseline comfort level  Description: Interventions:  - Encourage patient to monitor pain and request assistance  - Assess pain using appropriate pain scale  - Administer analgesics based on type and severity of pain and evaluate response  - Implement non-pharmacological measures as appropriate and evaluate response  - Consider cultural and social influences on pain and pain management  - Notify physician/advanced practitioner if interventions unsuccessful or patient reports new pain  Outcome: Progressing     Problem: INFECTION - ADULT  Goal: Absence or prevention of progression during hospitalization  Description: INTERVENTIONS:  - Assess and monitor for signs and symptoms of infection  - Monitor lab/diagnostic results  - Monitor all insertion sites, i e  indwelling lines, tubes, and drains  - Monitor endotracheal if appropriate and nasal secretions for changes in amount and color  - [place] appropriate cooling/warming therapies per order  - Administer medications as ordered  - Instruct and encourage patient and family to use good hand hygiene technique  - Identify and instruct in appropriate isolation precautions for identified infection/condition  Outcome: Progressing  Goal: Absence of fever/infection during neutropenic period  Description: INTERVENTIONS:  - Monitor WBC    Outcome: Progressing     Problem: SAFETY ADULT  Goal: Patient will remain free of falls  Description: INTERVENTIONS:  - Educate patient/family on patient safety including physical limitations  - Instruct patient to call for assistance with activity   - Consult OT/PT to assist with strengthening/mobility   - Keep Call bell within reach  - Keep bed low and locked with side rails adjusted as appropriate  - Keep care items and personal belongings within reach  - Initiate and maintain comfort rounds  - Make Fall Risk Sign visible to staff  - Offer Toileting every  Hours, in advance of need  - Initiate/Maintain alarm  - Obtain necessary fall risk management equipment:   - Apply yellow socks and bracelet for high fall risk patients  - Consider moving patient to room near nurses station  Outcome: Progressing  Goal: Maintain or return to baseline ADL function  Description: INTERVENTIONS:  -  Assess patient's ability to carry out ADLs; assess patient's baseline for ADL function and identify physical deficits which impact ability to perform ADLs (bathing, care of mouth/teeth, toileting, grooming, dressing, etc )  - Assess/evaluate cause of self-care deficits   - Assess range of motion  - Assess patient's mobility; develop plan if impaired  - Assess patient's need for assistive devices and provide as appropriate  - Encourage maximum independence but intervene and supervise when necessary  - Involve family in performance of ADLs  - Assess for home care needs following discharge   - Consider OT consult to assist with ADL evaluation and planning for discharge  - Provide patient education as appropriate  Outcome: Progressing  Goal: Maintains/Returns to pre admission functional level  Description: INTERVENTIONS:  - Perform BMAT or MOVE assessment daily    - Set and communicate daily mobility goal to care team and patient/family/caregiver  - Collaborate with rehabilitation services on mobility goals if consulted  - Perform Range of Motion  times a day  - Reposition patient every  hours  - Dangle patient  times a day  - Stand patient  times a day  - Ambulate patient  times a day  - Out of bed to chair times a day   - Out of bed for meals  times a day  - Out of bed for toileting  - Record patient progress and toleration of activity level   Outcome: Progressing     Problem: DISCHARGE PLANNING  Goal: Discharge to home or other facility with appropriate resources  Description: INTERVENTIONS:  - Identify barriers to discharge w/patient and caregiver  - Arrange for needed discharge resources and transportation as appropriate  - Identify discharge learning needs (meds, wound care, etc )  - Arrange for interpretive services to assist at discharge as needed  - Refer to Case Management Department for coordinating discharge planning if the patient needs post-hospital services based on physician/advanced practitioner order or complex needs related to functional status, cognitive ability, or social support system  Outcome: Progressing     Problem: Knowledge Deficit  Goal: Patient/family/caregiver demonstrates understanding of disease process, treatment plan, medications, and discharge instructions  Description: Complete learning assessment and assess knowledge base    Interventions:  - Provide teaching at level of understanding  - Provide teaching via preferred learning methods  Outcome: Progressing

## 2022-08-18 NOTE — PROGRESS NOTES
[address] [facility] [address] [address]   Office [phone], Fax [phone]      Chief Complaint:     Jason Teresa is a 73 y.o. female that presents for:    Chief Complaint   Patient presents with    Dysuria     And urgency/retention since this weekend. Assessment/Plan:   I personally reviewed the following Pertinent Labs/Studies:   - Encounter Notes from 2/2022, Labs from 2/2022      1. Dysuria  -     AMB POC URINALYSIS DIP STICK AUTO W/O MICRO  -     CULTURE, URINE; Future  -     cephALEXin (KEFLEX) 500 mg capsule; Take 1 Capsule by mouth four (4) times daily for 7 days. , Normal, Disp-28 Capsule, R-0   - Pt w/ significant hx of recurrent UTI - per her knowledge she does not have hx of resistant bacteria  - Will tx w/ keflex for 7 days and send urine for culture  - Pt instructed to stop macrobid while on keflex  - If UTI become more frequent also advised to follow back up w/ urology  - Discussed return precautions if pt develops fever, chills, N/V      Follow up: Follow-up and Dispositions    Return if symptoms worsen or fail to improve.           Subjective:   HPI:  Jason Teresa is a 73 y.o. female that presents for:    Dysuria x4 days  Pt notes frequency, dysuria, hesitance  Urine is also darker and foul smelling  Has hx recurrent UTI, was getting them multiple times a year but established w/ [facility] Dr. López Backers  They started her on daily macrobid and d-mannose w/ cranberry  Prev f/w [facility]  Denies fever, chills, flank pain, N/V, vaginal discharge    Health Maintenance:  Health Maintenance Due   Topic Date Due    Hepatitis C Screening  Never done    DTaP/Tdap/Td series (1 - Tdap) Never done    Colorectal Cancer Screening Combo  Never done    Low dose CT lung screening  Never done    Bone Densitometry (Dexa) Screening  Never done    Pneumococcal 65+ years (1 - PCV) Never done    COVID-19 Vaccine (4 - Booster for Pfizer series) 05/25/2022        ROS:   Review of Systems   All other systems reviewed and are negative. Past medical history, social history, and medications personally reviewed. Past Medical History:   Diagnosis Date    Arthritis     Seizure ([address]         Allergies personally reviewed. No Known Allergies     Objective:   Vitals reviewed. Visit Vitals  /89 (BP 1 Location: Right leg, BP Patient Position: Sitting, BP Cuff Size: Large adult)   Pulse 73   Temp 98.3 °F (36.8 °C) (Oral)   Resp 18   Ht 5' 9\" (1.753 m)   Wt 224 lb (101.6 kg)   SpO2 97%   BMI 33.08 kg/m²        Physical Exam    General AO x 3. No distress. Not diaphoretic. No jaundice. No cyanosis. No pallor. HEENT Normocephalic, atraumatic. Neck supple, no cervical adenopathy. EOMI. Cardio  Normal rate, regular rhythm. No murmur, rubs, or gallop. Pulmonary Effort normal. No accessory muscle use. No wheezes, rales, or rhonchi. Abdominal Soft. Bowel sounds normal. Mild TTP suprapubic, negative CVA tenderness. No distension. MSK  FROM, no joint swelling or tenderness. Neurological CN II-XII grossly intact. No focal deficits. Skin  No rash. Pt was discussed with Dr. Chicho Dc (attending physician). I have reviewed pertinent labs results and other data. I have discussed the diagnosis with the patient and the intended plan as seen in the above orders. The patient has received an after-visit summary and questions were answered concerning future plans. I have discussed medication side effects and warnings with the patient as well.     Roger Valderrama MD  Resident [address] [place] [address]  08/18/22

## 2022-08-19 ENCOUNTER — HOSPITAL ENCOUNTER (OUTPATIENT)
Dept: PHYSICAL THERAPY | Age: 67
Discharge: HOME | End: 2022-08-19
Payer: COMMERCIAL

## 2022-08-19 PROCEDURE — 97530 THERAPEUTIC ACTIVITIES: CPT

## 2022-08-19 PROCEDURE — 97760 ORTHOTIC MGMT&TRAING 1ST ENC: CPT

## 2022-08-19 NOTE — PROGRESS NOTES
LYMPHEDEMA PT DAILY TREATMENT NOTE - [facility] 2-15    Patient Name: Jitendra Loera  Date:2022  : 1955  [x]  Patient  Verified  Payor: Dawood Schulz / Plan:  [facility] [place] / Product Type: PPO /    In time: 813  Out time:   Total Treatment Time (min): 107  Total Timed Codes (min): 107  1:1 Treatment Time ( [address] only): 107  Visit #:  8  IE: 2022  PN:2022  Re-Evaluation: 10/13/2022    Treatment Area: Primary lymphedema (Q82.0), Swelling not relieved by elevation (R60.9), Pain in unspecified shoulder (M25.519)    SUBJECTIVE  Pain Level (0-10 scale): No reports of pain today  Any medication changes, allergies to medications, adverse drug reactions, diagnosis change, or new procedure performed?: [x] No    [] Yes (see summary sheet for update)  Subjective functional status/changes:   [x] No changes reported  Pt reports bandaging required re-wrapping yesterday due to slippage and the hand piece coming undone. Pt notes the most difficulty with the hand piece because it gets wet when she washes her hands and comes loose the more she uses her hands. She notes some itching as well at the elbow and top of the bandage. OBJECTIVE     min Therapeutic Exercise:  [] General Sago Exercises [x] Remedial Lymphedema Exercise Program [] Axillary Web Exercise Program      [x] Shoulder ROM Exercises:  As per prior appointment:  AROM shoulder flexion/ABD and elbow flexion/extension, wrist circles, overhead pec stretch. Instructed to perform with compression donned during HEP. Given handout. Rationale: Activate muscle pump to improve lymphatic fluid movement and decrease swelling to improve the patients ability to perform ADL and IADL skills and prevent worsening of swelling over time.       72  44 Min  Min  Min Manual Therapy  Therapeutic Activity  Orthotic Fit/Measurement  Kinesiotaping Not performed     Manual Lymphatic Drainage (MLD): - Not Performed  Area to decongest: UE: bilateral palm wrist elbow mid forearm axilla   Sequence used and effectiveness: Secondary sequence for upper extremity with trunk involvement. Full sequence bilateral UE. Stim bilateral axillary nodes due to no known insult to nodes. Skin/wound care/debridement: Reviewed skin care principles:   Performed skin care with low pH lotion following manual lymph principles. Skin care products  Hygiene  Prevention of cellulitis  Used combo of Sarna and Eucerin cream  Continued Calmoceptine antecubital fossa bilateral.   Applied multi-layer compression bandaging to:  -Performed Patient arrived without adequate bandage in place that was applied by caregiver. Removed prior to appointment to shower  bilateral  upper extremity    The following multi-layer bandages were applied:  Protouch stockinette  Deferred finger wraps today    Padding layer:  Cellona  Fleece    Foam:  10 cm roll    Short stretch bandages:   6 cm  8 cm  10 cm x 2--continued initial bandage applied using herringbone technique upper arm; second bandaging spiral technique  Spiral technique with moderate tension B/L UE       Orthotic Fit/Measurement    Upper/Lower Extremity Compression: Measured for the following compression products:    UE: bilateral palm wrist elbow mid forearm axilla bilateral upper extremity: Arm sleeve and Gauntlet    Style: Circular knit gauntlet and Flat-knit sleeves    Brand: [email or web address]    Type: Custom: Juzo Expert 30[address] B Non-Custom: Size: Large Gauntlet 2301    Vendor: [email or web address]    Education: Educated patient in compression garment donning and doffing. Daily wear schedule. Garment lifespan. Return and reordering process (by bringing prior garments into the clinic). Patient/family demonstrated donning and doffing with additional instructions needed for safe product use. Rationale: decrease pain, increase ROM, and decrease edema  to improve the patients ability to perform ADLs/IADLs without increased difficulty such as dressing.  Improve tolerance for UE related activities and reduce fatigue due to increased limb volume. NP   Vasopneumatic Device:    Body Part: [] R [] L [] Bilateral  Pressure: [] Decreased [] Normal [] Increased  Treatment Cycles: [] 1  [] 2  [] 3   Rationale: To improve lymphatic fluid movement and decrease swelling to improve the patients ability to perform ADL and IADL skills and prevent worsening of swelling over time. With   [] TE   [x] TA   [] MT   [] SC   [x] other: Patient Education: [x] Review HEP    [x] MLD Patient Education Reviewed with patient, as well as demonstration and instruction during MLD portion of the session. [] Progressed/Changed HEP based on:   [x] positioning   [] Kinesiotape   [x] Skin care   [] wound care   [] other:   [] Patient/caregiver in multi-layer bandaging donning principles. , Precautions. , and Handout provided. Patient / caregiver re-demonstrated bandaging. [] Yes  [x] No  Compression bandaging/garment precautions reviewed: [x] Yes  [] No     Other Objective/Functional Measures: LLIS 56/68 82% impairment  Height:     Weight:      BMI:     (36 or greater: adversely affecting lymphedema)    Volumetric Measurements:     Volumes:  Date Right (mL) Left (mL) Volume difference %  Difference   8/19/22 3,780.04 4,287. 59 507.55 11.84   08/16/22 3720.54 4333.59 613.05 14.15   08/09/22 4064.21 4737.27 673.06 14.21   07/21/22 4215.99 4848.01 632.02 13.04         Pain Level (0-10 scale) post treatment: 0/10 NPRS       ASSESSMENT/Changes in Function:  Pt volumes continue to reduce, L>R, and measured for custom flat knit compression sleeve during session as patient and spouse reported they will be going on vacation in late September and she will need a compression sleeve as bandaging will not be feasible while traveling. PT reiterated importance of compression garment wear during travel especially while flying, and pt/family verbalized understanding.  Opted to measure for off the shelf gauntlet as patient is unsure if she will want to utilize gauntlet at all times. Educated pt and family that gauntlet wear is important to maintain reduction in swelling of hands. Continued with MLB to maintain volume reduction. Will continue to follow up for insurance authorization and MD signatures needed, as obtaining garments can be lengthy process and patient is under time constraints due to travel plans. Patient will continue to benefit from skilled PT services to  address functional mobility deficits, address ROM deficits, address swelling, analyze compression product fit and use, assess and modify postural abnormalities, instruct in home lymphedema management program, measure for compression products, and modify and progress therapeutic interventions to attain remaining goals. [x]  See Plan of Care  []  See progress note/recertification  []  See Discharge Summary         Progress towards goals / Updated goals:  Short term goals  Time frame: 6 weeks  1. Patient will demonstrate knowledge of signs/symptoms of infections/cellulitis and be independent in skin care to prevent cellulitis. 2.  Patient will demonstrate independence in lymphedema home program of therapeutic exercises to improve circulation and decongest limb to improve ADLs. 8/12/2022 Patient reports good tolerance of HEP. 3.  Patient will tolerate multi-layer bandages (MLB) and show measureable decrease in limb volume by 400ml  and 200ml to allow ordering of home compression system (daytime, nighttime garments and pump as needed). 8/12/2022 ongoing     Long term goals  Time frame: 12 weeks  1. Pt will show improvement in the LLIS by decreasing the score to 25/68 36%  and thus allow improvement in patient's quality of life. ONGOING  2. Patient will be independent with don/doff of compression system and use in order to prevent reaccumulation of fluid at discharge. ONGOING  3.   Pt will be independent in self-MLD and show stable limb volumes showing decongestion and pt. ready for transition to independent restorative phase of lymphedema therapy.  ONGOING    PLAN  []  Upgrade activities as tolerated     [x]  Continue plan of care  []  Update interventions per flow sheet       []  Discharge due to:_  [x]  Other: progress note next session (administer LLIS), re-measure volumes      Samira Lowe, PT, DPT, CLT  8/19/2022

## 2022-08-23 ENCOUNTER — HOSPITAL ENCOUNTER (OUTPATIENT)
Dept: PHYSICAL THERAPY | Age: 67
Discharge: HOME | End: 2022-08-23
Payer: COMMERCIAL

## 2022-08-23 PROCEDURE — 97530 THERAPEUTIC ACTIVITIES: CPT

## 2022-08-23 NOTE — PROGRESS NOTES
LYMPHEDEMA PT PROGRESS NOTE - [facility] 15    Patient Name: Jason Teresa  Date:2022  : 1955  [x]  Patient  Verified  Payor: BLUE CROSS / Plan: [employer] [facility] [place] / Product Type: PPO /    In time: 4918 Out time: 1453  Total Treatment Time (min): 38  Total Timed Codes (min): 38  1:1 Treatment Time ([facility] only): 45  Visit #:  9  IE: 2022  PN:2022  Re-Evaluation: 10/13/2022    Treatment Area: Primary lymphedema (Q82.0), Swelling not relieved by elevation (R60.9), Pain in unspecified shoulder (M25.519)    SUBJECTIVE  Pain Level (0-10 scale): No reports of pain today  Any medication changes, allergies to medications, adverse drug reactions, diagnosis change, or new procedure performed?: [x] No    [] Yes (see summary sheet for update)  Subjective functional status/changes:   [x] No changes reported  Pt reports it is her birthday and she would like to not be bandaged today. She reports she would like to go to dinner with her family without being bandaged. Spouse is present and he reports he is comfortable performing MLD and bandaging at home. Spouse reports he often has to re-wrap patient, and he is comfortable to perform. Pt does report noting reduction in forearm and upper arm volumes since IE, however she feels it gets full at her shoulder sometimes. Pt and spouse report they would like to wait for insurance authorization for bandages versus paying out of pocket. OBJECTIVE     min Therapeutic Exercise:  [] Maddy Shaggy Exercises [x] Remedial Lymphedema Exercise Program [] Axillary Web Exercise Program      [x] Shoulder ROM Exercises:  As per prior appointment:  AROM shoulder flexion/ABD and elbow flexion/extension, wrist circles, overhead pec stretch. Instructed to perform with compression donned during HEP. Given handout. Rationale:  Activate muscle pump to improve lymphatic fluid movement and decrease swelling to improve the patients ability to perform ADL and IADL skills and prevent worsening of swelling over time. 45   Min  Min  Min Manual Therapy  Therapeutic Activity  Orthotic Fit/Measurement  Kinesiotaping Not performed     Manual Lymphatic Drainage (MLD): - Not Performed  Area to decongest: UE: bilateral palm wrist elbow mid forearm axilla   Sequence used and effectiveness: Secondary sequence for upper extremity with trunk involvement. Full sequence bilateral UE. Stim bilateral axillary nodes due to no known insult to nodes. Skin/wound care/debridement: Reviewed skin care principles:   Performed skin care with low pH lotion following manual lymph principles. Skin care products  Hygiene  Prevention of cellulitis  Used combo of Sarna and Eucerin cream  Continued Calmoceptine antecubital fossa bilateral.   Applied multi-layer compression bandaging to:  -Performed Patient arrived without adequate bandage in place that was applied by caregiver. Removed prior to appointment to shower  bilateral  upper extremity    The following multi-layer bandages were applied:  Protouch stockinette  Deferred finger wraps today    Padding layer:  Cellona  Fleece    Foam:  10 cm roll    Short stretch bandages:   6 cm  8 cm  10 cm x 2--continued initial bandage applied using herringbone technique upper arm; second bandaging spiral technique  Spiral technique with moderate tension B/L UE       Orthotic Fit/Measurement    Upper/Lower Extremity Compression: Measured for the following compression products:    UE: bilateral palm wrist elbow mid forearm axilla bilateral upper extremity: Arm sleeve and Gauntlet    Style: Circular knit gauntlet and Flat-knit sleeves    Brand: [employer]    Type: Custom: [employer] Expert 3021 B Non-Custom: Size: Large Gauntlet 2301    Vendor: [employer]    Education: Educated patient in compression garment donning and doffing. Daily wear schedule. Garment lifespan. Return and reordering process (by bringing prior garments into the clinic).     Patient/family demonstrated donning and doffing with additional instructions needed for safe product use. Therapeutic Activity 8/23/2022:  Pt and spouse educated extensively regarding MLD sequencing with handouts provided to promote optimal maintenance of volume reductions with home management. Printouts detail sequencing, with images, and with written sequencing. Demonstrated in session sequencing to spouse and patient with both verbalizing understanding. MLB application demonstrated and covered with spouse and patient to ensure accuracy and safety of MLB donning to promote volume maintenance and self management at home with assistance from spouse as caregiver. Spouse attentive and verbalizes understanding of short stretch bandage application principles to maximize favorable response, and minimize adverse reactions. Reviewed skin care principles, and MLB precautions for safety. Rationale: decrease pain, increase ROM, and decrease edema  to improve the patients ability to perform ADLs/IADLs without increased difficulty such as dressing. Improve tolerance for UE related activities and reduce fatigue due to increased limb volume. NP   Vasopneumatic Device:    Body Part: [] R [] L [] Bilateral  Pressure: [] Decreased [] Normal [] Increased  Treatment Cycles: [] 1  [] 2  [] 3   Rationale: To improve lymphatic fluid movement and decrease swelling to improve the patients ability to perform ADL and IADL skills and prevent worsening of swelling over time. With   [] TE   [x] TA   [] MT   [] SC   [] other: Patient Education: [x] Review HEP    [x] MLD Patient Education Reviewed with patient, as well as demonstration and instruction during MLD portion of the session. [] Progressed/Changed HEP based on:   [x] positioning   [] Kinesiotape   [x] Skin care   [] wound care   [] other:   [] Patient/caregiver in multi-layer bandaging donning principles. , Precautions. , and Handout provided. Patient / caregiver re-demonstrated bandaging.  [x] Yes  [] No  Compression bandaging/garment precautions reviewed: [x] Yes  [] No     Other Objective/Functional Measures: LLIS 36/68 53% impairment  Height:     Weight:      BMI:     (36 or greater: adversely affecting lymphedema)    Volumetric Measurements:     Volumes:  Date Right (mL) Left (mL) Volume difference %  Difference   8/19/22 3,780.04 4,287. 59 507.55 11.84   08/16/22 3720.54 4333.59 613.05 14.15   08/09/22 4064.21 4737.27 673.06 14.21   07/21/22 4215.99 4848.01 632.02 13.04         Pain Level (0-10 scale) post treatment: 0/10 NPRS       ASSESSMENT/Changes in Function:  Pt followed for 30 day progress note with patient meeting 3/6 short term and long term goals since IE. Previous measurements reveal 500-600ml reduction in limb volume B demonstrating favorable response to CDT thus far. LLIS impairment scores improved from 86% to 53% impairments since IE demonstrating a significant reduction in perceived impairment due to swelling. Pt and spouse educated in depth regarding at home MLD and MLB management to promote self management of condition for long term management. Spouse able to effectively verbalize understanding of MLD and MLB principles with good accuracy, and verbalizes confidence in ability to assist with management at home. Pt and spouse to self manage for one week and return for follow up at which time therapist will measure volumes to ensure adequate management is maintained while compression garments are ordered from DME. Pt and spouse educated that without adequate compression there is possibility that volumes may increase and extend the time required for multi-layer bandaging to return to appropriate size for custom garments. Pt and spouse verbalize understanding of risk. Will re-measure volumes next session to ensure adequate home management.      Patient will continue to benefit from skilled PT services to  address functional mobility deficits, address ROM deficits, address swelling, analyze compression product fit and use, assess and modify postural abnormalities, instruct in home lymphedema management program, measure for compression products, and modify and progress therapeutic interventions to attain remaining goals. [x]  See Plan of Care  []  See progress note/recertification  []  See Discharge Summary         Progress towards goals / Updated goals:  Short term goals  Time frame: 6 weeks  1. Patient will demonstrate knowledge of signs/symptoms of infections/cellulitis and be independent in skin care to prevent cellulitis. MET 8/23/22  2. Patient will demonstrate independence in lymphedema home program of therapeutic exercises to improve circulation and decongest limb to improve ADLs. 8/12/2022 Patient reports good tolerance of HEP. 3.  Patient will tolerate multi-layer bandages (MLB) and show measureable decrease in limb volume by 400ml  and 200ml to allow ordering of home compression system (daytime, nighttime garments and pump as needed). 8/23/22 MET     Long term goals  Time frame: 12 weeks  1. Pt will show improvement in the LLIS by decreasing the score to 25/68 36%  and thus allow improvement in patient's quality of life. ONGOING 8/23/22  2. Patient will be independent with don/doff of compression system and use in order to prevent reaccumulation of fluid at discharge. ONGOING 8/23/22  3. Pt will be independent in self-MLD and show stable limb volumes showing decongestion and pt. ready for transition to independent restorative phase of lymphedema therapy.  ONGOING 8/23/22    PLAN  []  Upgrade activities as tolerated     [x]  Continue plan of care  []  Update interventions per flow sheet       []  Discharge due to:_  [x]  Other:  re-measure volumes      Hannah Meza, PT, DPT, CLT  8/23/2022

## 2022-08-29 ENCOUNTER — TELEPHONE (OUTPATIENT)
Dept: FAMILY MEDICINE CLINIC | Age: 67
End: 2022-08-29

## 2022-08-29 NOTE — TELEPHONE ENCOUNTER
----- Message from Deven Castillo sent at 8/29/2022  2:08 PM EDT -----  Subject: Message to Provider    QUESTIONS  Information for Provider? Pt seen Karen Ramos on 09/20/2022 and finished her RX   and still has UTI symptoms. please give Call back and advise.   ---------------------------------------------------------------------------  --------------  [id number] [employer]  [phone]; OK to leave message on voicemail  ---------------------------------------------------------------------------  --------------  SCRIPT ANSWERS  Relationship to Patient? Other  Representative Name? Gillian Ricks  Additional information verified (besides Name and Date of Birth)? Phone   Number  Have you recently (14 days) seen a provider for this problem?  Yes

## 2022-08-30 ENCOUNTER — HOSPITAL ENCOUNTER (OUTPATIENT)
Dept: PHYSICAL THERAPY | Age: 67
Discharge: HOME | End: 2022-08-30
Payer: COMMERCIAL

## 2022-08-30 PROCEDURE — 97530 THERAPEUTIC ACTIVITIES: CPT

## 2022-08-30 NOTE — PROGRESS NOTES
LYMPHEDEMA PT PROGRESS NOTE - [facility] 2-15    Patient Name: Sue Barrera  Date:2022  : 1955  [x]  Patient  Verified  Payor: Ron Garza / Plan: 086 [facility] [place] / Product Type: PPO /    In time: 4361 Out time: 1448  Total Treatment Time (min): 34  Total Timed Codes (min): 34  1:1 Treatment Time (MC only): 34  Visit #:  10  IE: 2022  PN:2022  Re-Evaluation: 10/13/2022    Treatment Area: Primary lymphedema (Q82.0), Swelling not relieved by elevation (R60.9), Pain in unspecified shoulder (M25.519)    SUBJECTIVE  Pain Level (0-10 scale): No reports of pain today  Any medication changes, allergies to medications, adverse drug reactions, diagnosis change, or new procedure performed?: [x] No    [] Yes (see summary sheet for update)  Subjective functional status/changes:   [x] No changes reported  Pt and spouse report they have been compliant with bandaging at home to maintain volumes. Pt notes her  wraps her arm daily and she showers daily. No issues with tolerance of bandaging, and spouse denies questions regarding bandaging process. Pt requests to continue home management at this time until garments have been received. OBJECTIVE     min Therapeutic Exercise:  [] Rashaad Wilcox Exercises [x] Remedial Lymphedema Exercise Program [] Axillary Web Exercise Program      [x] Shoulder ROM Exercises:  As per prior appointment:  AROM shoulder flexion/ABD and elbow flexion/extension, wrist circles, overhead pec stretch. Instructed to perform with compression donned during HEP. Given handout. Rationale: Activate muscle pump to improve lymphatic fluid movement and decrease swelling to improve the patients ability to perform ADL and IADL skills and prevent worsening of swelling over time.       0 [facility] Manual Therapy  Therapeutic Activity  Orthotic Fit/Measurement  Kinesiotaping Not performed     Manual Lymphatic Drainage (MLD): - Not Performed  Area to decongest: UE: bilateral palm wrist elbow mid forearm axilla   Sequence used and effectiveness: Secondary sequence for upper extremity with trunk involvement. Full sequence bilateral UE. Stim bilateral axillary nodes due to no known insult to nodes. Skin/wound care/debridement: Reviewed skin care principles:   Performed skin care with low pH lotion following manual lymph principles. Skin care products  Hygiene  Prevention of cellulitis  Used combo of Sarna and Eucerin cream  Continued Calmoceptine antecubital fossa bilateral.   Applied multi-layer compression bandaging to:  -Performed Patient arrived without adequate bandage in place that was applied by caregiver. Removed prior to appointment to shower  bilateral  upper extremity    The following multi-layer bandages were applied:  Protouch stockinette  Deferred finger wraps today    Padding layer:  Cellona  Fleece    Foam:  10 cm roll    Short stretch bandages:   6 cm  8 cm  10 cm x 2--continued initial bandage applied using herringbone technique upper arm; second bandaging spiral technique  Spiral technique with moderate tension B/L UE       Orthotic Fit/Measurement    Upper/Lower Extremity Compression: Measured for the following compression products:    UE: bilateral palm wrist elbow mid forearm axilla bilateral upper extremity: Arm sleeve and Gauntlet    Style: Circular knit gauntlet and Flat-knit sleeves    Brand: Fuelzee    Type: Custom: VerbalizeItzo Expert 3021 B Non-Custom: Size: Large Gauntlet 2301    Vendor: [employer]    Education: Educated patient in compression garment donning and doffing. Daily wear schedule. Garment lifespan. Return and reordering process (by bringing prior garments into the clinic). Patient/family demonstrated donning and doffing with additional instructions needed for safe product use.     Therapeutic Activity   8/23/2022:  Pt and spouse educated extensively regarding MLD sequencing with handouts provided to promote optimal maintenance of volume reductions with home management. Printouts detail sequencing, with images, and with written sequencing. Demonstrated in session sequencing to spouse and patient with both verbalizing understanding. MLB application demonstrated and covered with spouse and patient to ensure accuracy and safety of MLB donning to promote volume maintenance and self management at home with assistance from spouse as caregiver. Spouse attentive and verbalizes understanding of short stretch bandage application principles to maximize favorable response, and minimize adverse reactions. Reviewed skin care principles, and MLB precautions for safety. 8/30/2022  Volumes re-measured to assess efficacy of home bandaging and compliance with home program to aid in transition to home management until garments are received for fitting. Reviewed skin care principles, and bandaging schedule for optimal maintenance until DME is received. Rationale: decrease pain, increase ROM, and decrease edema  to improve the patients ability to perform ADLs/IADLs without increased difficulty such as dressing. Improve tolerance for UE related activities and reduce fatigue due to increased limb volume. NP   Vasopneumatic Device:    Body Part: [] R [] L [] Bilateral  Pressure: [] Decreased [] Normal [] Increased  Treatment Cycles: [] 1  [] 2  [] 3   Rationale: To improve lymphatic fluid movement and decrease swelling to improve the patients ability to perform ADL and IADL skills and prevent worsening of swelling over time. With   [] TE   [x] TA   [] MT   [] SC   [] other: Patient Education: [x] Review HEP    [x] MLD Patient Education Reviewed with patient, as well as demonstration and instruction during MLD portion of the session. [] Progressed/Changed HEP based on:   [x] positioning   [] Kinesiotape   [x] Skin care   [] wound care   [] other:   [] Patient/caregiver in multi-layer bandaging donning principles. , Precautions. , and Handout provided.    Patient / caregiver re-demonstrated bandaging. [x] Yes  [] No  Compression bandaging/garment precautions reviewed: [x] Yes  [] No     Other Objective/Functional Measures: LLIS 36/68 53% impairment  Height:     Weight:      BMI:     (36 or greater: adversely affecting lymphedema)    Volumetric Measurements:     Volumes:  Date Right (mL) Left (mL) Volume difference %  Difference   8/30/2022 3573.45 4159.17 585.72 14.08   8/19/22 3,780.04 4,287. 59 507.55 11.84   08/16/22 3720.54 4333.59 613.05 14.15   08/09/22 4064.21 4737.27 673.06 14.21   07/21/22 4215.99 4848.01 632.02 13.04         Pain Level (0-10 scale) post treatment: 0/10 NPRS       ASSESSMENT/Changes in Function:  Pt and caregiver have been self managing at home with daily bandage changes for 11 days. Volumetric measures reveal slight reduction of volume on LUE, and approximately 200ml reduction of RUE at this time since performing home self management demonstrating good maintenance of condition at this time. Patient and spouse demonstrate adequate knowledge and ability to manage at home at this point with healthy skin noted, volumetric measures consistent, and ability to recall bandaging precautions well. Pt and spouse to self manage at home until they have received custom garments for professional fit testing. Encouraged scheduling follow up if any difficulties arise. Patient will continue to benefit from skilled PT services to  address functional mobility deficits, address ROM deficits, address swelling, analyze compression product fit and use, assess and modify postural abnormalities, instruct in home lymphedema management program, measure for compression products, and modify and progress therapeutic interventions to attain remaining goals. [x]  See Plan of Care  []  See progress note/recertification  []  See Discharge Summary         Progress towards goals / Updated goals:  Short term goals  Time frame: 6 weeks  1.   Patient will demonstrate knowledge of signs/symptoms of infections/cellulitis and be independent in skin care to prevent cellulitis. MET 8/23/22  2. Patient will demonstrate independence in lymphedema home program of therapeutic exercises to improve circulation and decongest limb to improve ADLs. 8/12/2022 Patient reports good tolerance of HEP. 3.  Patient will tolerate multi-layer bandages (MLB) and show measureable decrease in limb volume by 400ml  and 200ml to allow ordering of home compression system (daytime, nighttime garments and pump as needed). 8/23/22 MET     Long term goals  Time frame: 12 weeks  1. Pt will show improvement in the LLIS by decreasing the score to 25/68 36%  and thus allow improvement in patient's quality of life. ONGOING 8/23/22  2. Patient will be independent with don/doff of compression system and use in order to prevent reaccumulation of fluid at discharge. ONGOING 8/23/22  3. Pt will be independent in self-MLD and show stable limb volumes showing decongestion and pt. ready for transition to independent restorative phase of lymphedema therapy.  ONGOING 8/23/22    PLAN  []  Upgrade activities as tolerated     [x]  Continue plan of care  []  Update interventions per flow sheet       []  Discharge due to:_  [x]  Other:  PN 9/20/22      Cielo Stark, PT, DPT, CLT  8/30/2022

## 2022-09-02 ENCOUNTER — APPOINTMENT (OUTPATIENT)
Dept: PHYSICAL THERAPY | Age: 67
End: 2022-09-02

## 2022-09-06 ENCOUNTER — APPOINTMENT (OUTPATIENT)
Dept: PHYSICAL THERAPY | Age: 67
End: 2022-09-06

## 2022-09-06 ENCOUNTER — TELEPHONE (OUTPATIENT)
Dept: FAMILY MEDICINE CLINIC | Age: 67
End: 2022-09-06

## 2022-09-06 NOTE — TELEPHONE ENCOUNTER
A representative from [address] in regards to needing an attending doctor to [facility] prescription along with you and including NPI numbers. The prescription is for compression garments. When completed it can be faxed to [phone].

## 2022-09-09 ENCOUNTER — APPOINTMENT (OUTPATIENT)
Dept: PHYSICAL THERAPY | Age: 67
End: 2022-09-09

## 2022-09-09 DIAGNOSIS — N95.1 HOT FLASHES DUE TO MENOPAUSE: ICD-10-CM

## 2022-09-09 RX ORDER — VENLAFAXINE HYDROCHLORIDE 75 MG/1
CAPSULE, EXTENDED RELEASE ORAL
Qty: 60 CAPSULE | Refills: 2 | OUTPATIENT
Start: 2022-09-09

## 2022-09-09 NOTE — TELEPHONE ENCOUNTER
Form needed to be signed by attending physician. This writer had form signed by Dr Dorie Stratton. Faxed form along with recent notes as requested.

## 2022-09-13 ENCOUNTER — APPOINTMENT (OUTPATIENT)
Dept: PHYSICAL THERAPY | Age: 67
End: 2022-09-13

## 2022-09-16 ENCOUNTER — APPOINTMENT (OUTPATIENT)
Dept: PHYSICAL THERAPY | Age: 67
End: 2022-09-16

## 2022-09-20 ENCOUNTER — APPOINTMENT (OUTPATIENT)
Dept: PHYSICAL THERAPY | Age: 67
End: 2022-09-20

## 2022-10-04 DIAGNOSIS — N95.1 HOT FLASHES DUE TO MENOPAUSE: ICD-10-CM

## 2022-10-05 RX ORDER — VENLAFAXINE HYDROCHLORIDE 75 MG/1
CAPSULE, EXTENDED RELEASE ORAL
Qty: 60 CAPSULE | Refills: 2 | OUTPATIENT
Start: 2022-10-05

## 2022-12-12 ENCOUNTER — OFFICE VISIT (OUTPATIENT)
Dept: FAMILY MEDICINE CLINIC | Age: 67
End: 2022-12-12
Payer: COMMERCIAL

## 2022-12-12 DIAGNOSIS — M54.50 LEFT LOW BACK PAIN, UNSPECIFIED CHRONICITY, UNSPECIFIED WHETHER SCIATICA PRESENT: ICD-10-CM

## 2022-12-12 DIAGNOSIS — R30.0 DYSURIA: Primary | ICD-10-CM

## 2022-12-12 LAB
BILIRUB UR QL STRIP: NEGATIVE
GLUCOSE UR-MCNC: NEGATIVE MG/DL
KETONES P FAST UR STRIP-MCNC: NEGATIVE MG/DL
PH UR STRIP: 5.5 [PH] (ref 4.6–8)
PROT UR QL STRIP: NEGATIVE
SP GR UR STRIP: 1.01 (ref 1–1.03)
UA UROBILINOGEN AMB POC: NORMAL (ref 0.2–1)
URINALYSIS CLARITY POC: CLEAR
URINALYSIS COLOR POC: YELLOW
URINE BLOOD POC: NORMAL
URINE LEUKOCYTES POC: NORMAL
URINE NITRITES POC: NEGATIVE

## 2022-12-12 PROCEDURE — G9899 SCRN MAM PERF RSLTS DOC: HCPCS | Performed by: FAMILY MEDICINE

## 2022-12-12 PROCEDURE — G8400 PT W/DXA NO RESULTS DOC: HCPCS | Performed by: FAMILY MEDICINE

## 2022-12-12 PROCEDURE — 99213 OFFICE O/P EST LOW 20 MIN: CPT | Performed by: FAMILY MEDICINE

## 2022-12-12 PROCEDURE — 81003 URINALYSIS AUTO W/O SCOPE: CPT | Performed by: FAMILY MEDICINE

## 2022-12-12 PROCEDURE — G8427 DOCREV CUR MEDS BY ELIG CLIN: HCPCS | Performed by: FAMILY MEDICINE

## 2022-12-12 PROCEDURE — 3074F SYST BP LT 130 MM HG: CPT | Performed by: FAMILY MEDICINE

## 2022-12-12 PROCEDURE — G8754 DIAS BP LESS 90: HCPCS | Performed by: FAMILY MEDICINE

## 2022-12-12 PROCEDURE — G8536 NO DOC ELDER MAL SCRN: HCPCS | Performed by: FAMILY MEDICINE

## 2022-12-12 PROCEDURE — 1101F PT FALLS ASSESS-DOCD LE1/YR: CPT | Performed by: FAMILY MEDICINE

## 2022-12-12 PROCEDURE — 3017F COLORECTAL CA SCREEN DOC REV: CPT | Performed by: FAMILY MEDICINE

## 2022-12-12 PROCEDURE — 1090F PRES/ABSN URINE INCON ASSESS: CPT | Performed by: FAMILY MEDICINE

## 2022-12-12 PROCEDURE — 1123F ACP DISCUSS/DSCN MKR DOCD: CPT | Performed by: FAMILY MEDICINE

## 2022-12-12 PROCEDURE — 3078F DIAST BP <80 MM HG: CPT | Performed by: FAMILY MEDICINE

## 2022-12-12 PROCEDURE — G8752 SYS BP LESS 140: HCPCS | Performed by: FAMILY MEDICINE

## 2022-12-12 PROCEDURE — G8417 CALC BMI ABV UP PARAM F/U: HCPCS | Performed by: FAMILY MEDICINE

## 2022-12-12 PROCEDURE — G9717 DOC PT DX DEP/BP F/U NT REQ: HCPCS | Performed by: FAMILY MEDICINE

## 2022-12-12 RX ORDER — CEPHALEXIN 500 MG/1
500 CAPSULE ORAL 2 TIMES DAILY
Qty: 20 CAPSULE | Refills: 0 | Status: SHIPPED | OUTPATIENT
Start: 2022-12-12 | End: 2022-12-22

## 2022-12-12 RX ORDER — CEPHALEXIN 500 MG/1
500 CAPSULE ORAL 4 TIMES DAILY
Qty: 40 CAPSULE | Refills: 0 | Status: SHIPPED | OUTPATIENT
Start: 2022-12-12 | End: 2022-12-12

## 2022-12-12 NOTE — PROGRESS NOTES
Sandro Echols (: 1955) is a 78 y.o. female is here for evaluation of the following chief complaint(s): Urinary Frequency    Subjective/Objective:   Pressure pain in lower back for 1 week. Associated with urinary frequency, urgency and feelings of decreased urinary volume. She has a hx of frequent UTIs for which she is followed by urology. Current takes takes estrace, macrobid ppx and OTC cranberry supplement to help reduce frequency of her infections. Notes pain will start in her back and radiate down her left buttocks and into thigh. /78 (BP 1 Location: Right arm, BP Patient Position: Sitting, BP Cuff Size: Large adult)   Pulse 87   Temp 98 °F (36.7 °C)   Resp 13   Ht 5' 9\" (1.753 m)   Wt 224 lb (101.6 kg)   SpO2 98%   BMI 33.08 kg/m²      Physical Exam  Constitutional:       General: She is not in acute distress. Appearance: She is obese. Cardiovascular:      Rate and Rhythm: Normal rate and regular rhythm. Pulmonary:      Effort: Pulmonary effort is normal.      Breath sounds: Normal breath sounds. Abdominal:      General: Bowel sounds are normal. There is no distension. Palpations: Abdomen is soft. Tenderness: There is abdominal tenderness in the right lower quadrant, suprapubic area and left lower quadrant. There is no left CVA tenderness, guarding or rebound. Musculoskeletal:      Lumbar back: Tenderness present. Back:    Skin:     Capillary Refill: Capillary refill takes less than 2 seconds. Neurological:      Mental Status: She is alert.      Results for orders placed or performed in visit on 22   AMB POC URINALYSIS DIP STICK AUTO W/O MICRO   Result Value Ref Range    Color (UA POC) Yellow     Clarity (UA POC) Clear     Glucose (UA POC) Negative Negative    Bilirubin (UA POC) Negative Negative    Ketones (UA POC) Negative Negative    Specific gravity (UA POC) 1.015 1.001 - 1.035    Blood (UA POC) Trace Negative    pH (UA POC) 5.5 4.6 - 8.0 Protein (UA POC) Negative Negative    Urobilinogen (UA POC) 0.2 mg/dL 0.2 - 1    Nitrites (UA POC) Negative Negative    Leukocyte esterase (UA POC) 1+ Negative         Assessment/Plan:   1. Dysuria  Hx of recurrent UTIs with suprapubic tenderness. UA equivocal and sent for culture. Will treat with Keflex. If culture negative, would stop before complete course. -     AMB POC URINALYSIS DIP STICK AUTO W/O MICRO  -     CULTURE, URINE; Future  -     cephALEXin (KEFLEX) 500 mg capsule; Take 1 Capsule by mouth two (2) times a day for 10 days. , Normal, Disp-20 Capsule, R-0Correction to recent RX. Should be twice a day, not 4 times per day. Patient notified  2. Left low back pain, unspecified chronicity, unspecified whether sciatica present  Based on exam, I suspect there is also a musculoskeletal component of her pain. Will treat UTI. If pain persists, would recommend the follow-up for re-evaluation. May use heat and OTC analgesics in the meantime. No RED FLAG sx on todays visit. Return in about 4 weeks (around 1/9/2023), or if symptoms worsen or fail to improve. An electronic signature was used to authenticate this note.   -- Belen Murray MD

## 2022-12-12 NOTE — PROGRESS NOTES
Kelly Brown is a 78 y.o. female    Chief Complaint   Patient presents with    Urinary Frequency       1. Have you been to the ER, urgent care clinic since your last visit? Hospitalized since your last visit? No  2. Have you seen or consulted any other health care providers outside of the [address] [place] [address] since your last visit? Include any pap smears or colon screening.  No    Visit Vitals  /78 (BP 1 Location: Right arm, BP Patient Position: Sitting, BP Cuff Size: Large adult)   Pulse 87   Temp 98 °F (36.7 °C)   Resp 13   Ht 5' 9\" (1.753 m)   Wt 224 lb (101.6 kg)   SpO2 98%   BMI 33.08 kg/m²     3 most recent PHQ Screens 5/20/2022   Little interest or pleasure in doing things Not at all   Feeling down, depressed, irritable, or hopeless Not at all   Total Score PHQ 2 0     Health Maintenance Due   Topic Date Due    Hepatitis C Screening  Never done    DTaP/Tdap/Td series (1 - Tdap) Never done    Colorectal Cancer Screening Combo  Never done    Low dose CT lung screening  Never done    Bone Densitometry (Dexa) Screening  Never done    Pneumococcal 65+ years (1 - PCV) Never done    COVID-19 Vaccine (4 - Booster for Pfizer series) 03/22/2022    Flu Vaccine (1) 08/01/2022

## 2022-12-13 VITALS
SYSTOLIC BLOOD PRESSURE: 129 MMHG | DIASTOLIC BLOOD PRESSURE: 78 MMHG | TEMPERATURE: 98 F | RESPIRATION RATE: 13 BRPM | HEIGHT: 69 IN | HEART RATE: 87 BPM | WEIGHT: 224 LBS | OXYGEN SATURATION: 98 % | BODY MASS INDEX: 33.18 KG/M2

## 2022-12-13 DIAGNOSIS — R30.0 DYSURIA: ICD-10-CM

## 2022-12-16 LAB
BACTERIA SPEC CULT: ABNORMAL
CC UR VC: ABNORMAL
SERVICE CMNT-IMP: ABNORMAL

## 2023-01-13 DIAGNOSIS — N95.1 HOT FLASHES DUE TO MENOPAUSE: ICD-10-CM

## 2023-01-13 RX ORDER — CLONIDINE HYDROCHLORIDE 0.1 MG/1
TABLET ORAL
Qty: 90 TABLET | Refills: 1 | Status: SHIPPED | OUTPATIENT
Start: 2023-01-13

## 2023-02-03 ENCOUNTER — OFFICE VISIT (OUTPATIENT)
Dept: FAMILY MEDICINE CLINIC | Age: 68
End: 2023-02-03

## 2023-02-03 VITALS
WEIGHT: 232.4 LBS | OXYGEN SATURATION: 96 % | RESPIRATION RATE: 16 BRPM | HEIGHT: 69 IN | SYSTOLIC BLOOD PRESSURE: 126 MMHG | DIASTOLIC BLOOD PRESSURE: 77 MMHG | TEMPERATURE: 98.5 F | HEART RATE: 85 BPM | BODY MASS INDEX: 34.42 KG/M2

## 2023-02-03 DIAGNOSIS — F33.0 MAJOR DEPRESSIVE DISORDER, RECURRENT, MILD (HCC): ICD-10-CM

## 2023-02-03 DIAGNOSIS — M16.12 OSTEOARTHRITIS OF LEFT HIP, UNSPECIFIED OSTEOARTHRITIS TYPE: Primary | ICD-10-CM

## 2023-02-03 DIAGNOSIS — R56.9 SEIZURES (HCC): ICD-10-CM

## 2023-02-03 DIAGNOSIS — Z11.59 ENCOUNTER FOR HEPATITIS C SCREENING TEST FOR LOW RISK PATIENT: ICD-10-CM

## 2023-02-03 DIAGNOSIS — Z01.818 PRE-OP TESTING: ICD-10-CM

## 2023-02-03 DIAGNOSIS — Z86.39 HISTORY OF ELEVATED GLUCOSE: ICD-10-CM

## 2023-02-03 NOTE — PROGRESS NOTES
[id number] [facility]    Subjective  Varsha Delgado is an 78 y.o. female who presents for preoperative clearance. Of note she had L hip arthroplasty 3/2022 without issues. Does have a hx of epilepsy- follows with Dr. Rehana Trvais, neurology at [facility]. Has about 1 focal seizure per month. Compliant with lamictal 100mg tid and lyrica 75mg tid. She has also seen a cardiologist once in 3/2022 for an abnormal EKG. EKG from that time showed NSR, LAFB, LVH, poor r wave progression, no acute ST changes. He did not recommend continued follow up. She denies chest pain, BOATENG, SOB today.      Planned procedure: R total hip arthroplasty on 2/28/2023  Surgeon: Dr. Isabel Lopez     History of injury: N/A    Risk Assessment using Revised Mera cardiac risk index (RCRI):  High-risk type of surgery (examples include vascular surgery and any open intraperitoneal or intrathoracic procedures): no  History of ischemic heart disease (i.e. MI or a positive exercise test, current complaint of chest pain, use of nitrate therapy, or ECG with pathological Q waves): no  History of HF: no  History of cerebrovascular disease: no  Diabetes mellitus requiring treatment with insulin: no  Preoperative serum creatinine >2.0 mg/dL (177 µmol/L): no    Assessment of Cardiac Functional Status:   Can take care of self, such as eat, dress, or use the toilet (1 MET): yes  Can walk up a flight of steps or a hill or walk on level ground at 3 to 4 mph (4 METs): yes  Can do heavy work around the house such as scrubbing floors or lifting or moving heavy furniture or climb two flights of stairs (between 4 and 10 METs): no  Can participate in strenuous sports such as swimming, singles tennis, football, basketball, and skiing (>10 METs): no    Allergies - reviewed:   No Known Allergies    Medications - reviewed:   Current Outpatient Medications   Medication Sig    atorvastatin (LIPITOR) 40 mg tablet TAKE 1 TABLET BY MOUTH EVERY DAY    mv,Ca,min-folic acid-vit K1 448-88 mcg tab Take 1 Tablet by mouth daily. estradioL (ESTRACE) 0.01 % (0.1 mg/gram) vaginal cream     lamoTRIgine (LaMICtal) 100 mg tablet TAKE 3 TABLETS BY MOUTH DAILY. FINAL DOSE: 1AM, 2PM    pregabalin (LYRICA) 75 mg capsule TAKE 3 CAPSULES BY MOUTH DAILY. 2 IN MORNING, 1 IN EVENING    nitrofurantoin, macrocrystal-monohydrate, (MACROBID) 100 mg capsule Take 100 mg by mouth daily. meloxicam (MOBIC) 15 mg tablet Take 15 mg by mouth daily. hydroCHLOROthiazide (HYDRODIURIL) 25 mg tablet Take 50 mg by mouth two (2) times a day. aspirin (ASPIRIN) 325 mg tablet Take 325 mg by mouth. No current facility-administered medications for this visit.      Past Medical History - reviewed:  Past Medical History:   Diagnosis Date    Arthritis     Seizure ([facility] [address]      Past Surgical History - reviewed:   Past Surgical History:   Procedure Laterality Date    HX CHOLECYSTECTOMY      HX COLONOSCOPY  2014    HX DENTAL TRANSPLANT      HX HYSTERECTOMY      HX ORTHOPAEDIC      knee left and right     Social History - reviewed:  Social History     Socioeconomic History    Marital status:      Spouse name: Not on file    Number of children: Not on file    Years of education: Not on file    Highest education level: Not on file   Occupational History    Not on file   Tobacco Use    Smoking status: Former     Packs/day: 2.00     Years: 30.00     Pack years: 60.00     Types: Cigarettes     Quit date:      Years since quittin.0    Smokeless tobacco: Never   Vaping Use    Vaping Use: Never used   Substance and Sexual Activity    Alcohol use: Yes     Comment: occ    Drug use: Not Currently    Sexual activity: Yes   Other Topics Concern    Not on file   Social History Narrative    Not on file     Social Determinants of Health     Financial Resource Strain: Not on file   Food Insecurity: Not on file   Transportation Needs: Not on file Physical Activity: Not on file   Stress: Not on file   Social Connections: Not on file   Intimate Partner Violence: Not on file   Housing Stability: Not on file     Family History - reviewed:  Family History   Problem Relation Age of Onset    Cancer Mother     Heart Disease Mother     Cancer Father      Immunizations - reviewed:   Immunization History   Administered Date(s) Administered    COVID-19, MODERNA BLUE border, Primary or Immunocompromised, (age 18y+), IM, 100 mcg/0.5mL 01/25/2022    COVID-19, PFIZER PURPLE top, DILUTE for use, (age 15 y+), IM, 30mcg/0.3mL 04/29/2021, 05/20/2021    Influenza Vaccine 12/10/2021    Zoster Recombinant 10/21/2021, 12/30/2021     Review of Systems   Constitutional:  Negative for chills, fever and malaise/fatigue. Respiratory:  Negative for cough and shortness of breath. Cardiovascular:  Negative for chest pain, palpitations and leg swelling. Gastrointestinal:  Negative for abdominal pain, constipation, diarrhea, nausea and vomiting. Neurological:  Negative for headaches. Physical Exam  Visit Vitals  /77 (BP 1 Location: Right upper arm, BP Patient Position: Sitting, BP Cuff Size: Large adult)   Pulse 85   Temp 98.5 °F (36.9 °C) (Oral)   Resp 16   Ht 5' 9\" (1.753 m)   Wt 232 lb 6.4 oz (105.4 kg)   SpO2 96%   BMI 34.32 kg/m²     Physical Exam  Constitutional:       Appearance: Normal appearance. HENT:      Head: Normocephalic and atraumatic. Eyes:      General: No scleral icterus. Conjunctiva/sclera: Conjunctivae normal.   Cardiovascular:      Rate and Rhythm: Normal rate and regular rhythm. Heart sounds: No murmur heard. No friction rub. Pulmonary:      Effort: Pulmonary effort is normal. No respiratory distress. Breath sounds: Normal breath sounds. No wheezing or rales. Abdominal:      General: Abdomen is flat. Palpations: Abdomen is soft. Skin:     General: Skin is warm and dry.    Neurological:      General: No focal deficit present. Mental Status: She is alert and oriented to person, place, and time. Psychiatric:         Mood and Affect: Mood normal.         Behavior: Behavior normal.       Assessment/Plan    ICD-10-CM ICD-9-CM    1. Osteoarthritis of left hip, unspecified osteoarthritis type  M16.12 715.95       2. Pre-op testing  Z01.818 V72.84 AMB POC EKG ROUTINE W/ 12 LEADS, INTER & REP      METABOLIC PANEL, BASIC      CBC W/O DIFF      CBC W/O DIFF      METABOLIC PANEL, BASIC      3. Major depressive disorder, recurrent, mild (HCC)  F33.0 296.31       4. Seizures ([facility] [address]  R56.9 780.39       5. Encounter for hepatitis C screening test for low risk patient  Z11.59 V73.89 HEPATITIS C AB      HEPATITIS C AB      6. History of elevated glucose  Z86.39 V12.29 HEMOGLOBIN A1C WITH EAG      HEMOGLOBIN A1C WITH EAG        Kamala Dillon is scheduled to have noncardiac surgery and has been evaluated for the risk of a cardiovascular perioperative cardiac event. female is cleared for surgery and the estimated risk of an adverse cardiac event using the Revised Mera Cardiac Risk Index (RCRI) is 3.9%. Ortho recommended EKG, CBC, BMP for preop clearance. EKG today with NSR, LAD, LVH, and anterior fascicular block, unchanged from previous EKG a year ago. I have discussed the diagnosis with the patient and the intended plan as seen in the above orders. The patient has received an after-visit summary and questions were answered concerning future plans. I have discussed medication side effects and warnings with the patient as well.     Patient discussed with Dr. Moira Sharma, attending physician   Ramonita Mac DO

## 2023-02-04 LAB
ANION GAP SERPL CALC-SCNC: 2 MMOL/L (ref 5–15)
BUN SERPL-MCNC: 23 MG/DL (ref 6–20)
BUN/CREAT SERPL: 32 (ref 12–20)
CALCIUM SERPL-MCNC: 9.7 MG/DL (ref 8.5–10.1)
CHLORIDE SERPL-SCNC: 104 MMOL/L (ref 97–108)
CO2 SERPL-SCNC: 33 MMOL/L (ref 21–32)
CREAT SERPL-MCNC: 0.72 MG/DL (ref 0.55–1.02)
ERYTHROCYTE [DISTWIDTH] IN BLOOD BY AUTOMATED COUNT: 12.9 % (ref 11.5–14.5)
EST. AVERAGE GLUCOSE BLD GHB EST-MCNC: 105 MG/DL
GLUCOSE SERPL-MCNC: 119 MG/DL (ref 65–100)
HBA1C MFR BLD: 5.3 % (ref 4–5.6)
HCT VFR BLD AUTO: 41.5 % (ref 35–47)
HCV AB SERPL QL IA: NONREACTIVE
HGB BLD-MCNC: 14.3 G/DL (ref 11.5–16)
MCH RBC QN AUTO: 31.1 PG (ref 26–34)
MCHC RBC AUTO-ENTMCNC: 34.5 G/DL (ref 30–36.5)
MCV RBC AUTO: 90.2 FL (ref 80–99)
NRBC # BLD: 0 K/UL (ref 0–0.01)
NRBC BLD-RTO: 0 PER 100 WBC
PLATELET # BLD AUTO: 216 K/UL (ref 150–400)
PMV BLD AUTO: 10.8 FL (ref 8.9–12.9)
POTASSIUM SERPL-SCNC: 3.4 MMOL/L (ref 3.5–5.1)
RBC # BLD AUTO: 4.6 M/UL (ref 3.8–5.2)
SODIUM SERPL-SCNC: 139 MMOL/L (ref 136–145)
WBC # BLD AUTO: 6 K/UL (ref 3.6–11)

## 2023-05-24 ENCOUNTER — OFFICE VISIT (OUTPATIENT)
Age: 68
End: 2023-05-24
Payer: COMMERCIAL

## 2023-05-24 VITALS
SYSTOLIC BLOOD PRESSURE: 114 MMHG | HEART RATE: 89 BPM | OXYGEN SATURATION: 96 % | WEIGHT: 227 LBS | BODY MASS INDEX: 33.52 KG/M2 | DIASTOLIC BLOOD PRESSURE: 78 MMHG

## 2023-05-24 DIAGNOSIS — Z12.2 SCREENING FOR LUNG CANCER: ICD-10-CM

## 2023-05-24 DIAGNOSIS — Z00.00 WELL WOMAN EXAM WITHOUT GYNECOLOGICAL EXAM: Primary | ICD-10-CM

## 2023-05-24 DIAGNOSIS — E78.2 MIXED HYPERLIPIDEMIA: ICD-10-CM

## 2023-05-24 DIAGNOSIS — Z12.11 SCREENING FOR COLON CANCER: ICD-10-CM

## 2023-05-24 DIAGNOSIS — D17.9 LIPOMA, UNSPECIFIED SITE: ICD-10-CM

## 2023-05-24 DIAGNOSIS — R23.2 HOT FLASHES: ICD-10-CM

## 2023-05-24 DIAGNOSIS — Z23 ENCOUNTER FOR IMMUNIZATION: ICD-10-CM

## 2023-05-24 DIAGNOSIS — Z12.31 ENCOUNTER FOR SCREENING MAMMOGRAM FOR MALIGNANT NEOPLASM OF BREAST: ICD-10-CM

## 2023-05-24 DIAGNOSIS — R74.8 ELEVATED ALKALINE PHOSPHATASE LEVEL: ICD-10-CM

## 2023-05-24 DIAGNOSIS — Z78.0 MENOPAUSE: ICD-10-CM

## 2023-05-24 PROCEDURE — 3017F COLORECTAL CA SCREEN DOC REV: CPT | Performed by: FAMILY MEDICINE

## 2023-05-24 PROCEDURE — 90715 TDAP VACCINE 7 YRS/> IM: CPT | Performed by: STUDENT IN AN ORGANIZED HEALTH CARE EDUCATION/TRAINING PROGRAM

## 2023-05-24 PROCEDURE — 90471 IMMUNIZATION ADMIN: CPT | Performed by: STUDENT IN AN ORGANIZED HEALTH CARE EDUCATION/TRAINING PROGRAM

## 2023-05-24 PROCEDURE — 99214 OFFICE O/P EST MOD 30 MIN: CPT | Performed by: STUDENT IN AN ORGANIZED HEALTH CARE EDUCATION/TRAINING PROGRAM

## 2023-05-24 PROCEDURE — 1090F PRES/ABSN URINE INCON ASSESS: CPT | Performed by: FAMILY MEDICINE

## 2023-05-24 PROCEDURE — G8427 DOCREV CUR MEDS BY ELIG CLIN: HCPCS | Performed by: FAMILY MEDICINE

## 2023-05-24 PROCEDURE — 3074F SYST BP LT 130 MM HG: CPT | Performed by: FAMILY MEDICINE

## 2023-05-24 PROCEDURE — G8400 PT W/DXA NO RESULTS DOC: HCPCS | Performed by: FAMILY MEDICINE

## 2023-05-24 PROCEDURE — 3078F DIAST BP <80 MM HG: CPT | Performed by: FAMILY MEDICINE

## 2023-05-24 PROCEDURE — G8417 CALC BMI ABV UP PARAM F/U: HCPCS | Performed by: FAMILY MEDICINE

## 2023-05-24 PROCEDURE — 99397 PER PM REEVAL EST PAT 65+ YR: CPT | Performed by: STUDENT IN AN ORGANIZED HEALTH CARE EDUCATION/TRAINING PROGRAM

## 2023-05-24 PROCEDURE — 1036F TOBACCO NON-USER: CPT | Performed by: FAMILY MEDICINE

## 2023-05-24 SDOH — ECONOMIC STABILITY: INCOME INSECURITY: HOW HARD IS IT FOR YOU TO PAY FOR THE VERY BASICS LIKE FOOD, HOUSING, MEDICAL CARE, AND HEATING?: NOT HARD AT ALL

## 2023-05-24 SDOH — ECONOMIC STABILITY: FOOD INSECURITY: WITHIN THE PAST 12 MONTHS, YOU WORRIED THAT YOUR FOOD WOULD RUN OUT BEFORE YOU GOT MONEY TO BUY MORE.: NEVER TRUE

## 2023-05-24 SDOH — ECONOMIC STABILITY: FOOD INSECURITY: WITHIN THE PAST 12 MONTHS, THE FOOD YOU BOUGHT JUST DIDN'T LAST AND YOU DIDN'T HAVE MONEY TO GET MORE.: NEVER TRUE

## 2023-05-24 SDOH — ECONOMIC STABILITY: HOUSING INSECURITY
IN THE LAST 12 MONTHS, WAS THERE A TIME WHEN YOU DID NOT HAVE A STEADY PLACE TO SLEEP OR SLEPT IN A SHELTER (INCLUDING NOW)?: NO

## 2023-05-24 NOTE — PROGRESS NOTES
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 female with PMHx of HTN, hot flashes, focal epilepsy, OA, HLD, depression, lymphedema presenting for well woman exam. Her concern today includes hot flashes and constipation. Constipation  - Has hard pellet like stools  - Has tried miralax and colace on their own with no relief  - Has tried Mag citrate previously with some relief but it is no longer helping     Hot flashes   - Started at 62, worse in the past 2 years   - LMP in her 35s after ?partial hysterectomy. She thinks her ovaries may have been left behind but she is not 100% sure   - TSH always. - Has tried effexor and clonidine without improvement     Diet: eats small amounts, some fruits/vegetables, does eat out occasionally   Exercise: going to the gym daily since recent hip replacement   Tobacco: former smoker, quit in 2012, smoked 2ppd x 30 years   Alcohol: socially   Illicit drug use: none    Health Maintenance reviewed -  Pap smear: Had partial hysterectomy in her 35s where she   Mammogram: Due, normal 1 year ago   Colonoscopy: last one 5-6 years ago, believes they told her to come back in 5-10 years   DEXA scan: Due   Hepatitis C testing: NR 2023  Lung cancer screening: Due, never had   Pneumonia vaccine: Due  Shingles vaccine: UTD   Tdap vaccine: Due, last one over 10 years ago     Allergies- reviewed:   No Known Allergies    Medications- reviewed:   Current Outpatient Medications   Medication Sig    Licorice, Glycyrrhiza glabra, (LICORICE ROOT PO) Take by mouth    QUERCETIN PO Take by mouth    RESVERATROL PO Take by mouth    Barberry-Oreg Grape-Goldenseal (BERBERINE COMPLEX PO) Take by mouth     No current facility-administered medications for this visit.        Past Medical History- reviewed:  Past Medical History:   Diagnosis Date    Arthritis     Seizure [facility])        Past Surgical History- reviewed:

## 2023-05-24 NOTE — PROGRESS NOTES
Chief Complaint   Patient presents with    Annual Exam     Vitals:    05/24/23 1329   BP: 114/78   Pulse: 89   SpO2: 96%

## 2023-05-25 LAB
ALBUMIN SERPL-MCNC: 4.3 G/DL (ref 3.5–5)
ALBUMIN/GLOB SERPL: 1.9 (ref 1.1–2.2)
ALP SERPL-CCNC: 133 U/L (ref 45–117)
ALT SERPL-CCNC: 27 U/L (ref 12–78)
ANION GAP SERPL CALC-SCNC: 4 MMOL/L (ref 5–15)
AST SERPL-CCNC: 20 U/L (ref 15–37)
BILIRUB SERPL-MCNC: 0.7 MG/DL (ref 0.2–1)
BUN SERPL-MCNC: 16 MG/DL (ref 6–20)
BUN/CREAT SERPL: 22 (ref 12–20)
CALCIUM SERPL-MCNC: 9.8 MG/DL (ref 8.5–10.1)
CHLORIDE SERPL-SCNC: 103 MMOL/L (ref 97–108)
CHOLEST SERPL-MCNC: 161 MG/DL
CO2 SERPL-SCNC: 32 MMOL/L (ref 21–32)
CREAT SERPL-MCNC: 0.72 MG/DL (ref 0.55–1.02)
GGT SERPL-CCNC: 25 U/L (ref 5–55)
GLOBULIN SER CALC-MCNC: 2.3 G/DL (ref 2–4)
GLUCOSE SERPL-MCNC: 103 MG/DL (ref 65–100)
HDLC SERPL-MCNC: 55 MG/DL
HDLC SERPL: 2.9 (ref 0–5)
LDLC SERPL CALC-MCNC: 64.2 MG/DL (ref 0–100)
POTASSIUM SERPL-SCNC: 3.1 MMOL/L (ref 3.5–5.1)
PROT SERPL-MCNC: 6.6 G/DL (ref 6.4–8.2)
SODIUM SERPL-SCNC: 139 MMOL/L (ref 136–145)
TRIGL SERPL-MCNC: 209 MG/DL
VLDLC SERPL CALC-MCNC: 41.8 MG/DL

## 2023-05-29 RX ORDER — MELOXICAM 15 MG/1
15 TABLET ORAL DAILY
COMMUNITY
Start: 2021-12-15

## 2023-05-29 RX ORDER — LAMOTRIGINE 100 MG/1
TABLET ORAL
COMMUNITY
Start: 2022-02-01

## 2023-05-29 RX ORDER — ESTRADIOL 0.1 MG/G
CREAM VAGINAL
COMMUNITY
Start: 2021-09-01 | End: 2023-06-02 | Stop reason: ALTCHOICE

## 2023-05-29 RX ORDER — ASPIRIN 325 MG
325 TABLET ORAL
COMMUNITY

## 2023-05-29 RX ORDER — NITROFURANTOIN 25; 75 MG/1; MG/1
100 CAPSULE ORAL DAILY
COMMUNITY
Start: 2022-02-02

## 2023-05-29 RX ORDER — HYDROCHLOROTHIAZIDE 25 MG/1
50 TABLET ORAL 2 TIMES DAILY
COMMUNITY
Start: 2022-02-16 | End: 2023-06-01 | Stop reason: SINTOL

## 2023-05-29 RX ORDER — ATORVASTATIN CALCIUM 40 MG/1
1 TABLET, FILM COATED ORAL DAILY
COMMUNITY
Start: 2022-06-07 | End: 2023-06-19 | Stop reason: SDUPTHER

## 2023-05-29 RX ORDER — PREGABALIN 75 MG/1
CAPSULE ORAL
COMMUNITY
Start: 2022-01-06

## 2023-05-31 ENCOUNTER — PATIENT MESSAGE (OUTPATIENT)
Age: 68
End: 2023-05-31

## 2023-05-31 DIAGNOSIS — I10 BENIGN HYPERTENSION: Primary | ICD-10-CM

## 2023-06-01 ENCOUNTER — NURSE ONLY (OUTPATIENT)
Age: 68
End: 2023-06-01

## 2023-06-01 DIAGNOSIS — E55.9 VITAMIN D DEFICIENCY, UNSPECIFIED: ICD-10-CM

## 2023-06-01 DIAGNOSIS — R74.8 ELEVATED ALKALINE PHOSPHATASE LEVEL: ICD-10-CM

## 2023-06-01 RX ORDER — LISINOPRIL 20 MG/1
20 TABLET ORAL DAILY
Qty: 30 TABLET | Refills: 1 | Status: SHIPPED | OUTPATIENT
Start: 2023-06-01

## 2023-06-01 NOTE — TELEPHONE ENCOUNTER
From: Jed Marquez  To: Miguel Gray DO  Sent: 5/31/2023 8:06 AM EDT  Subject: potassium level    Dr. Yamil Medel. I would rather change the hydrochlorothiazide if that will do the trick instead of adding yet another med to my long list. Also, we will follow-up with the endocrinologist, which is a different referral than you gave me at my latest appointment. We also have all 3 scans scheduled for next week.     Thanks  Jed Marquez

## 2023-06-02 ENCOUNTER — OFFICE VISIT (OUTPATIENT)
Age: 68
End: 2023-06-02
Payer: COMMERCIAL

## 2023-06-02 VITALS
RESPIRATION RATE: 18 BRPM | OXYGEN SATURATION: 96 % | DIASTOLIC BLOOD PRESSURE: 71 MMHG | TEMPERATURE: 98 F | BODY MASS INDEX: 33.62 KG/M2 | HEIGHT: 69 IN | SYSTOLIC BLOOD PRESSURE: 139 MMHG | HEART RATE: 69 BPM | WEIGHT: 227 LBS

## 2023-06-02 DIAGNOSIS — R61 DIAPHORESIS: ICD-10-CM

## 2023-06-02 DIAGNOSIS — R74.8 ALKALINE PHOSPHATASE ELEVATION: Primary | ICD-10-CM

## 2023-06-02 DIAGNOSIS — N95.1 VASOMOTOR SYMPTOMS DUE TO MENOPAUSE: ICD-10-CM

## 2023-06-02 DIAGNOSIS — R63.5 WEIGHT GAIN: ICD-10-CM

## 2023-06-02 PROBLEM — S06.9X9A TRAUMATIC BRAIN INJURY WITH LOSS OF CONSCIOUSNESS (HCC): Status: ACTIVE | Noted: 2017-12-29

## 2023-06-02 PROBLEM — D17.20 LIPOMA OF ARM: Status: ACTIVE | Noted: 2019-09-17

## 2023-06-02 LAB
CALCIUM SERPL-MCNC: 9.7 MG/DL (ref 8.5–10.1)
PTH-INTACT SERPL-MCNC: 38.9 PG/ML (ref 18.4–88)

## 2023-06-02 PROCEDURE — 3078F DIAST BP <80 MM HG: CPT | Performed by: INTERNAL MEDICINE

## 2023-06-02 PROCEDURE — 3074F SYST BP LT 130 MM HG: CPT | Performed by: INTERNAL MEDICINE

## 2023-06-02 PROCEDURE — 99205 OFFICE O/P NEW HI 60 MIN: CPT | Performed by: INTERNAL MEDICINE

## 2023-06-02 PROCEDURE — 1123F ACP DISCUSS/DSCN MKR DOCD: CPT | Performed by: INTERNAL MEDICINE

## 2023-06-02 RX ORDER — DEXAMETHASONE 1 MG
TABLET ORAL
Qty: 1 TABLET | Refills: 0 | Status: SHIPPED | OUTPATIENT
Start: 2023-06-02

## 2023-06-02 NOTE — PROGRESS NOTES
Patt Messina is a 78 y.o. female here for   Chief Complaint   Patient presents with    Other     Elevated alkaline phosphate levels        1. Have you been to the ER or an urgent care clinic since your last visit?  - n/a    2. Have you been hospitalized since your last visit? - n/a    3. Have you seen or consulted any other health care providers outside of the [address] [place] [address] since your last visit? Include any pap smears or colon screening.- n/a    Pt was seen by her PCP for a physical and it was discovered that she has an elevated alkaline phosphate level. Pt is also suffering from hot flashes that have been going on for the past 6 years, PCP unable to find a solution. Pt stated that when she bends over that the hot flashes start. Pt is also concerned with fluid build up on her arms, pt was seen by a lymphedema specialist with no relief.

## 2023-06-02 NOTE — PROGRESS NOTES
Michela Orantes MD          Patient Information  Date:6/2/2023  Name : Alex Hopson 78 y.o.     YOB: 1955         Referred by: Rekha Kinsey DO         Chief Complaint   Patient presents with    Other     Elevated alkaline phosphate levels        History of Present Illness: Alex Hopson is a 78 y.o. female was referred for evaluation of elevated alkaline phosphatase. Alkaline phosphatase 133, lower limit of normal 117. History of seizures, on antiseizure medication, no known history of vitamin D deficiency,  History of falls, recent fall, no fractures, no known osteoporosis. History of bilateral hip replacement, spinal DJD, questionable spinal stenosis  Scheduled for DEXA  No weight loss, no known thyroid disorders  Also reports excessive sweating on exertion, at night, hysterectomy at age 29, per  she might have had oophorectomy also  Sweating started at age 62 years, she has tried Clonidine, venlafexine with without much benefit    No rash, diarrhea, no relation to the food, reports weight gain    Past Medical History:   Diagnosis Date    Arthritis     Seizure [facility])      Past Surgical History:   Procedure Laterality Date    CHOLECYSTECTOMY      COLONOSCOPY  08/01/2014    DENTAL SURGERY      HYSTERECTOMY (CERVIX STATUS UNKNOWN)      JOINT REPLACEMENT Right 02/2023    ORTHOPEDIC SURGERY      knee left and right     Current Outpatient Medications   Medication Sig    Multiple Vitamins-Minerals (WOMENS MULTIVITAMIN PO) Take by mouth    D-MANNOSE PO Take by mouth With cranberry    lisinopril (PRINIVIL;ZESTRIL) 20 MG tablet Take 1 tablet by mouth daily    aspirin 325 MG tablet Take 1 tablet by mouth    atorvastatin (LIPITOR) 40 MG tablet Take 1 tablet by mouth daily    lamoTRIgine (LAMICTAL) 100 MG tablet TAKE 3 TABLETS BY MOUTH DAILY.  FINAL DOSE: 1AM, 2PM    meloxicam (MOBIC) 15 MG tablet Take 1 tablet by mouth daily    nitrofurantoin,

## 2023-06-03 LAB
25(OH)D3 SERPL-MCNC: 44.8 NG/ML (ref 30–100)
LDH SERPL L TO P-CCNC: 195 U/L (ref 81–246)
TSH SERPL DL<=0.05 MIU/L-ACNC: 1.69 UIU/ML (ref 0.36–3.74)

## 2023-06-05 LAB — TRYPTASE SERPL-MCNC: 3.9 UG/L (ref 2.2–13.2)

## 2023-06-06 ENCOUNTER — HOSPITAL ENCOUNTER (OUTPATIENT)
Facility: HOSPITAL | Age: 68
Discharge: HOME | End: 2023-06-09
Attending: STUDENT IN AN ORGANIZED HEALTH CARE EDUCATION/TRAINING PROGRAM
Payer: COMMERCIAL

## 2023-06-06 ENCOUNTER — APPOINTMENT (OUTPATIENT)
Facility: HOSPITAL | Age: 68
End: 2023-06-06
Attending: STUDENT IN AN ORGANIZED HEALTH CARE EDUCATION/TRAINING PROGRAM
Payer: COMMERCIAL

## 2023-06-06 VITALS — HEIGHT: 69 IN | BODY MASS INDEX: 33.62 KG/M2 | WEIGHT: 227 LBS

## 2023-06-06 DIAGNOSIS — Z12.31 ENCOUNTER FOR SCREENING MAMMOGRAM FOR MALIGNANT NEOPLASM OF BREAST: ICD-10-CM

## 2023-06-06 DIAGNOSIS — Z12.2 SCREENING FOR LUNG CANCER: ICD-10-CM

## 2023-06-06 PROCEDURE — 77067 SCR MAMMO BI INCL CAD: CPT

## 2023-06-06 PROCEDURE — 71271 CT THORAX LUNG CANCER SCR C-: CPT

## 2023-06-07 LAB
ALP BONE CFR SERPL: 31 % (ref 14–68)
ALP INTEST CFR SERPL: 4 % (ref 0–18)
ALP LIVER CFR SERPL: 65 % (ref 18–85)
ALP SERPL-CCNC: 132 IU/L (ref 44–121)

## 2023-06-08 DIAGNOSIS — R74.8 ALKALINE PHOSPHATASE ELEVATION: Primary | ICD-10-CM

## 2023-06-15 ENCOUNTER — HOSPITAL ENCOUNTER (OUTPATIENT)
Facility: HOSPITAL | Age: 68
Discharge: HOME | End: 2023-06-18
Attending: STUDENT IN AN ORGANIZED HEALTH CARE EDUCATION/TRAINING PROGRAM
Payer: COMMERCIAL

## 2023-06-15 DIAGNOSIS — Z78.0 MENOPAUSE: ICD-10-CM

## 2023-06-15 DIAGNOSIS — R61 DIAPHORESIS: Primary | ICD-10-CM

## 2023-06-15 PROCEDURE — 77080 DXA BONE DENSITY AXIAL: CPT

## 2023-06-15 RX ORDER — GLYCOPYRROLATE 2 MG/1
TABLET ORAL
Qty: 90 TABLET | Refills: 1 | Status: SHIPPED | OUTPATIENT
Start: 2023-06-15

## 2023-06-16 DIAGNOSIS — E78.5 HYPERLIPIDEMIA, UNSPECIFIED: ICD-10-CM

## 2023-06-19 RX ORDER — ATORVASTATIN CALCIUM 40 MG/1
TABLET, FILM COATED ORAL
Qty: 90 TABLET | Refills: 3 | Status: SHIPPED | OUTPATIENT
Start: 2023-06-19 | End: 2024-06-18

## 2023-06-19 NOTE — TELEPHONE ENCOUNTER
Medication Refill Request    Christos Dueñas is requesting a refill of the following medication(s):   Requested Prescriptions     Pending Prescriptions Disp Refills    atorvastatin (LIPITOR) 40 MG tablet [Pharmacy Med Name: ATORVASTATIN 40 MG TABLET] 90 tablet      Sig: TAKE 1 TABLET BY MOUTH EVERY DAY      Last provider to prescribe medication: Dr. Sadaf Mcdermott  Last Date of Medication Prescribed: 06/07/2022   Last Office Visit Date: 05/24/2023  Last Labs:   Lab Results   Component Value Date    CHOL 161 05/24/2023    CHOL 189 06/09/2022    CHOL 224 (H) 02/18/2022     Lab Results   Component Value Date    TRIG 209 (H) 05/24/2023    TRIG 285 (H) 06/09/2022    TRIG 196 (H) 02/18/2022     Lab Results   Component Value Date    HDL 55 05/24/2023    HDL 43 06/09/2022    HDL 61 02/18/2022     Lab Results   Component Value Date    LDLCALC 64.2 05/24/2023    LDLCALC 89 06/09/2022    LDLCALC 123.8 (H) 02/18/2022     Lab Results   Component Value Date    LABVLDL 41.8 05/24/2023    LABVLDL 57 06/09/2022    LABVLDL 39.2 02/18/2022     Lab Results   Component Value Date    CHOLHDLRATIO 2.9 05/24/2023    CHOLHDLRATIO 4.4 06/09/2022    CHOLHDLRATIO 3.7 02/18/2022       Please send refill to:    [facility]/pharmacy #[id number] [place], [place] - [address] [phone] - F [phone]  [place] [address] [address]  Phone: [phone] Fax: [phone]

## 2023-06-24 DIAGNOSIS — I10 BENIGN HYPERTENSION: ICD-10-CM

## 2023-06-26 ENCOUNTER — TELEPHONE (OUTPATIENT)
Age: 68
End: 2023-06-26

## 2023-06-28 RX ORDER — LISINOPRIL 20 MG/1
TABLET ORAL
Qty: 30 TABLET | Refills: 1 | Status: SHIPPED | OUTPATIENT
Start: 2023-06-28

## 2023-07-03 ENCOUNTER — HOSPITAL ENCOUNTER (OUTPATIENT)
Facility: HOSPITAL | Age: 68
Discharge: HOME | End: 2023-07-06
Attending: INTERNAL MEDICINE
Payer: COMMERCIAL

## 2023-07-03 DIAGNOSIS — R74.8 ALKALINE PHOSPHATASE ELEVATION: ICD-10-CM

## 2023-07-03 PROCEDURE — A9503 TC99M MEDRONATE: HCPCS | Performed by: INTERNAL MEDICINE

## 2023-07-03 PROCEDURE — 3430000000 HC RX DIAGNOSTIC RADIOPHARMACEUTICAL: Performed by: INTERNAL MEDICINE

## 2023-07-03 PROCEDURE — 78306 BONE IMAGING WHOLE BODY: CPT | Performed by: INTERNAL MEDICINE

## 2023-07-03 RX ORDER — TC 99M MEDRONATE 20 MG/10ML
24.5 INJECTION, POWDER, LYOPHILIZED, FOR SOLUTION INTRAVENOUS
Status: COMPLETED | OUTPATIENT
Start: 2023-07-03 | End: 2023-07-03

## 2023-07-03 RX ADMIN — TC 99M MEDRONATE 24.5 MILLICURIE: 20 INJECTION, POWDER, LYOPHILIZED, FOR SOLUTION INTRAVENOUS at 10:45

## 2023-07-05 ENCOUNTER — OFFICE VISIT (OUTPATIENT)
Age: 68
End: 2023-07-05
Payer: COMMERCIAL

## 2023-07-05 VITALS
HEART RATE: 67 BPM | TEMPERATURE: 97.9 F | HEIGHT: 69 IN | DIASTOLIC BLOOD PRESSURE: 73 MMHG | SYSTOLIC BLOOD PRESSURE: 133 MMHG | OXYGEN SATURATION: 94 % | BODY MASS INDEX: 34.07 KG/M2 | WEIGHT: 230 LBS

## 2023-07-05 DIAGNOSIS — R10.32 LEFT LOWER QUADRANT ABDOMINAL PAIN: Primary | ICD-10-CM

## 2023-07-05 DIAGNOSIS — N39.0 RECURRENT UTI: ICD-10-CM

## 2023-07-05 LAB
BILIRUBIN, URINE, POC: NEGATIVE
BLOOD URINE, POC: NEGATIVE
GLUCOSE URINE, POC: NEGATIVE
KETONES, URINE, POC: NEGATIVE
LEUKOCYTE ESTERASE, URINE, POC: NORMAL
NITRITE, URINE, POC: NEGATIVE
PH, URINE, POC: 7 (ref 4.6–8)
PROTEIN,URINE, POC: NEGATIVE
SPECIFIC GRAVITY, URINE, POC: 1.02 (ref 1–1.03)
URINALYSIS CLARITY, POC: CLEAR
URINALYSIS COLOR, POC: YELLOW
UROBILINOGEN, POC: NORMAL

## 2023-07-05 PROCEDURE — 3078F DIAST BP <80 MM HG: CPT

## 2023-07-05 PROCEDURE — 3075F SYST BP GE 130 - 139MM HG: CPT

## 2023-07-05 PROCEDURE — 99213 OFFICE O/P EST LOW 20 MIN: CPT

## 2023-07-05 PROCEDURE — 1123F ACP DISCUSS/DSCN MKR DOCD: CPT

## 2023-07-05 PROCEDURE — 81003 URINALYSIS AUTO W/O SCOPE: CPT

## 2023-07-05 ASSESSMENT — PATIENT HEALTH QUESTIONNAIRE - PHQ9
2. FEELING DOWN, DEPRESSED OR HOPELESS: 0
SUM OF ALL RESPONSES TO PHQ QUESTIONS 1-9: 0
SUM OF ALL RESPONSES TO PHQ9 QUESTIONS 1 & 2: 0
1. LITTLE INTEREST OR PLEASURE IN DOING THINGS: 0
SUM OF ALL RESPONSES TO PHQ QUESTIONS 1-9: 0

## 2023-07-05 NOTE — PATIENT INSTRUCTIONS
Please take the following medications: two packets of mirilax daily with plenty of water, dulcolax suppository, and magnesium citrate. Consider increasing your fiber intake using a fiber supplement or incorporating more fiber-rich foods.

## 2023-07-14 DIAGNOSIS — R61 DIAPHORESIS: ICD-10-CM

## 2023-07-14 RX ORDER — GLYCOPYRROLATE 2 MG/1
2 TABLET ORAL 3 TIMES DAILY
Qty: 270 TABLET | Refills: 3 | OUTPATIENT
Start: 2023-07-14

## 2023-07-20 NOTE — PROGRESS NOTES
Olimpia Vanessa is a 78 y.o. female presents for a problem visit. Chief Complaint   Patient presents with    Menopause     No LMP recorded. Patient has had a hysterectomy. Birth Control: status post hysterectomy.     The patient is reporting having: menopause symptoms- severe hot flashes, patient states she is drenching with sweat, has hx of seizures and told that she can not take hormone replacements           Examination chaperoned by Diane Chacko MA.

## 2023-07-22 DIAGNOSIS — I10 BENIGN HYPERTENSION: ICD-10-CM

## 2023-07-24 ENCOUNTER — OFFICE VISIT (OUTPATIENT)
Age: 68
End: 2023-07-24
Payer: COMMERCIAL

## 2023-07-24 VITALS — WEIGHT: 226.4 LBS | SYSTOLIC BLOOD PRESSURE: 125 MMHG | BODY MASS INDEX: 33.43 KG/M2 | DIASTOLIC BLOOD PRESSURE: 69 MMHG

## 2023-07-24 DIAGNOSIS — R68.89 HEAT INTOLERANCE: Primary | ICD-10-CM

## 2023-07-24 PROCEDURE — 3074F SYST BP LT 130 MM HG: CPT | Performed by: OBSTETRICS & GYNECOLOGY

## 2023-07-24 PROCEDURE — 1123F ACP DISCUSS/DSCN MKR DOCD: CPT | Performed by: OBSTETRICS & GYNECOLOGY

## 2023-07-24 PROCEDURE — 3078F DIAST BP <80 MM HG: CPT | Performed by: OBSTETRICS & GYNECOLOGY

## 2023-07-24 PROCEDURE — 99202 OFFICE O/P NEW SF 15 MIN: CPT | Performed by: OBSTETRICS & GYNECOLOGY

## 2023-07-24 NOTE — PROGRESS NOTES
OB/GYN Problem VIsit    HPI  Steward Litten is a ,  78 y.o. female who presents for a problem visit. She is complaining of hot flashes but when she describes them she is actually just feeling hot. The symptoms last for hours. They are relieved by standing in front of a fan or somehow cooling off. She has profuse sweating associated with this. She is already been seen by her endocrinologist and tried Effexor and clonidine as well as a medication for sweating which were all unsuccessful in alleviating her symptoms. She had a hysterectomy in her 35s for endometriosis but states she does still have her ovaries. She also has a seizure disorder and is followed by neurology. Past Medical History:   Diagnosis Date    Arthritis     Seizure [facility])      Past Surgical History:   Procedure Laterality Date    CHOLECYSTECTOMY      COLONOSCOPY  2014    DENTAL SURGERY      HYSTERECTOMY (CERVIX STATUS UNKNOWN)      JOINT REPLACEMENT Right 2023    ORTHOPEDIC SURGERY      knee left and right     Social History     Occupational History    Not on file   Tobacco Use    Smoking status: Former     Packs/day: 2.00     Types: Cigarettes     Quit date: 2012     Years since quittin.5    Smokeless tobacco: Never   Substance and Sexual Activity    Alcohol use: Yes    Drug use: Not Currently    Sexual activity: Yes     Partners: Male     Birth control/protection: Surgical     Comment: hysterectomy     Family History   Problem Relation Age of Onset    Cancer Father     Heart Disease Mother     Cancer Mother        No Known Allergies  Prior to Admission medications    Medication Sig Start Date End Date Taking?  Authorizing Provider   hydroCHLOROthiazide (HYDRODIURIL) 25 MG tablet Take 2 tablets by mouth daily 23 Yes Vanesa Lomas, DO   potassium chloride (KLOR-CON M) 20 MEQ extended release tablet Take 1 tablet by mouth daily 23  Yes Vanesa Lomas DO   atorvastatin (LIPITOR) 40 MG tablet

## 2023-07-26 NOTE — TELEPHONE ENCOUNTER
This writer contacted patient, two patient identifiers verified with patient.  Patient states she is currently taking medication and requesting a refill on medication/ Prescription pending for authorization     Last Office Visit: 05/24/2023 Dr. Baljeet Wilson  Recommended to follow up in 1 years  Lab Results   Component Value Date     05/24/2023    K 3.1 (L) 05/24/2023     05/24/2023    CO2 32 05/24/2023    BUN 16 05/24/2023    CREATININE 0.72 05/24/2023    GLUCOSE 103 (H) 05/24/2023    CALCIUM 9.7 06/01/2023    PROT 6.6 05/24/2023    LABALBU 4.3 05/24/2023    BILITOT 0.7 05/24/2023    ALKPHOS 132 (H) 06/02/2023    AST 20 05/24/2023    ALT 27 05/24/2023    LABGLOM >60 05/24/2023    GFRAA >60 03/09/2022    AGRATIO 1.4 06/25/2021    GLOB 2.3 05/24/2023

## 2023-07-27 ENCOUNTER — PATIENT MESSAGE (OUTPATIENT)
Age: 68
End: 2023-07-27

## 2023-07-27 DIAGNOSIS — I10 BENIGN HYPERTENSION: Primary | ICD-10-CM

## 2023-07-28 NOTE — TELEPHONE ENCOUNTER
From: Delvin Shanks  To: Sonya Marquez MD  Sent: 7/27/2023 5:42 PM EDT  Subject: Rx Refill    Dr. Zena Grimes. As Dr. Adelina Hammans partner, and now her replacement, you earlier issued a prescription for me for Lisinopril 20Mg on 6/28/23. However, it is not on my Rx list with you. My pharmacy is trying to refill now and needs an authorization from you if you still need me to take it. Let me know.     Thanks,    Delvin Shanks

## 2023-07-29 RX ORDER — LISINOPRIL 20 MG/1
20 TABLET ORAL DAILY
Qty: 90 TABLET | Refills: 1 | Status: SHIPPED | OUTPATIENT
Start: 2023-07-29

## 2023-07-29 RX ORDER — LISINOPRIL 20 MG/1
TABLET ORAL
Qty: 30 TABLET | Refills: 1 | Status: SHIPPED | OUTPATIENT
Start: 2023-07-29

## 2023-09-14 ENCOUNTER — OFFICE VISIT (OUTPATIENT)
Age: 68
End: 2023-09-14

## 2023-09-14 VITALS
HEART RATE: 73 BPM | WEIGHT: 233.2 LBS | OXYGEN SATURATION: 95 % | DIASTOLIC BLOOD PRESSURE: 80 MMHG | BODY MASS INDEX: 34.44 KG/M2 | RESPIRATION RATE: 16 BRPM | SYSTOLIC BLOOD PRESSURE: 118 MMHG | TEMPERATURE: 98.3 F

## 2023-09-14 DIAGNOSIS — N39.0 URINARY TRACT INFECTION WITHOUT HEMATURIA, SITE UNSPECIFIED: Primary | ICD-10-CM

## 2023-09-14 LAB
BILIRUBIN, URINE, POC: NEGATIVE
BLOOD URINE, POC: NEGATIVE
GLUCOSE URINE, POC: NEGATIVE
KETONES, URINE, POC: NEGATIVE
LEUKOCYTE ESTERASE, URINE, POC: ABNORMAL
NITRITE, URINE, POC: NEGATIVE
PH, URINE, POC: 7 (ref 4.6–8)
PROTEIN,URINE, POC: NEGATIVE
SPECIFIC GRAVITY, URINE, POC: 1.01 (ref 1–1.03)
URINALYSIS CLARITY, POC: CLEAR
URINALYSIS COLOR, POC: ABNORMAL
UROBILINOGEN, POC: ABNORMAL

## 2023-09-14 RX ORDER — SULFAMETHOXAZOLE AND TRIMETHOPRIM 800; 160 MG/1; MG/1
1 TABLET ORAL 2 TIMES DAILY
Qty: 10 TABLET | Refills: 0 | Status: SHIPPED | OUTPATIENT
Start: 2023-09-14 | End: 2023-09-19

## 2023-09-14 RX ORDER — PHENAZOPYRIDINE HYDROCHLORIDE 200 MG/1
200 TABLET, FILM COATED ORAL 3 TIMES DAILY PRN
Qty: 10 TABLET | Refills: 0 | Status: SHIPPED | OUTPATIENT
Start: 2023-09-14 | End: 2023-09-17

## 2023-09-14 NOTE — PROGRESS NOTES
Chief Complaint   Patient presents with    Urinary Tract Infection     C/o itching and burning when passing x 8 days. Dysuria   This is a recurrent problem. The current episode started in the past 7 days. The problem occurs intermittently. The problem has been gradually worsening. The quality of the pain is described as burning. The pain is mild. There has been no fever. There is No history of pyelonephritis. Associated symptoms include frequency and urgency. Pertinent negatives include no chills, flank pain or hesitancy. Associated symptoms comments: Vaginal and urethral itching . Her past medical history is significant for recurrent UTIs. on daily Macrobid       Past Medical History:   Diagnosis Date    Arthritis     Seizure [facility])        Past Surgical History:   Procedure Laterality Date    CHOLECYSTECTOMY      COLONOSCOPY  2014    DENTAL SURGERY      HYSTERECTOMY (CERVIX STATUS UNKNOWN)      JOINT REPLACEMENT Right 2023    ORTHOPEDIC SURGERY      knee left and right       Social History     Tobacco Use    Smoking status: Former     Packs/day: 2     Types: Cigarettes     Quit date: 2012     Years since quittin.7     Passive exposure: Past    Smokeless tobacco: Never   Vaping Use    Vaping Use: Never used   Substance Use Topics    Alcohol use: Yes    Drug use: Not Currently        Allergies   Allergen Reactions    Naproxen Other (See Comments) and Hives     Reacts with seizure meds. (Pharmacists's note:  Tegretol may enhance the adverse/toxic effect of Nonsteroidal Anti-Inflammatory Agents, but it is not contraindicated. Use customary monitoring (bleeding) to monitor NSAID therapy)  Reacts with seizure meds. (Pharmacists's note:  Tegretol may enhance the adverse/toxic effect of Nonsteroidal Anti-Inflammatory Agents, but it is not contraindicated. Use customary monitoring (bleeding) to monitor NSAID therapy)  Reacts with seizure meds.   (Pharmacists's note:  Tegretol may enhance the

## 2023-09-14 NOTE — PATIENT INSTRUCTIONS
Results for orders placed or performed in visit on 09/14/23   AMB POC URINALYSIS DIP STICK AUTO W/ MICRO   Result Value Ref Range    Color (UA POC) Orange     Clarity (UA POC) Clear     Glucose, Urine, POC Negative Negative    Bilirubin, Urine, POC Negative Negative    Ketones, Urine, POC Negative Negative    Specific Gravity, Urine, POC 1.015 1.001 - 1.035    Blood (UA POC) Negative Negative    pH, Urine, POC 7.0 4.6 - 8.0    Protein, Urine, POC Negative Negative    Urobilinogen, POC 0.2 mg/dL     Nitrite, Urine, POC Negative Negative    Leukocyte Esterase, Urine, POC Trace Negative

## 2024-01-05 ENCOUNTER — TELEPHONE (OUTPATIENT)
Age: 69
End: 2024-01-05

## 2024-01-05 DIAGNOSIS — E87.6 HYPOKALEMIA: Primary | ICD-10-CM

## 2024-01-05 NOTE — TELEPHONE ENCOUNTER
01/05/24  11:26 AM    Called and discussed updates in pt's recent health changes w pt's , Mr. Nielsen - pt has had longstanding h/o epilepsy - recently had sEEG implantation by [facility] neurosurgery. Per chart review, pt underwent phase 2 intracranial monitoring on 12/4, with subsequent explantation of electrodes 12/18. She did well during her admission and was discharged 12/19. Since then, she has had two episodes of seizures - one on 12/21 for which she was seen at [facility] ER, and another on 12/25 for which she was seen at [facility], then transferred to [facility]. There, she was noted to have an increase int he size of her subdural hematoma with associated swelling. She was started on steroids to help w the swelling. Mr. Nielsen noted BP were on the higher end (160s/80s) and there were not any changes made to meds. She was discharged home on 12/30, has had home PT/OT following who have been helping with her left sided deficits. PT/OT have noted elevated BPs as well - 140s-160s SBP. Mr. Nielsen is concerned about BP elevations (prev was 120s/80s prior to hospitalization). He spoke with pharmacist who advised pt speak with PCP about increasing Lisinopril to 40mg daily and d/c HCTZ as she has had issues with hypoK since starting on HCTZ. I advised that for now, we should continue meds as they are - will switch KlorCon to EfferK as pt has had issues w swallowing KlorCon. Will also reach out to pt's neurologist to clarify BP goals as I am unable to see records from most recent admission and what BP goals were on d/c. Will also get pt scheduled for hospital f/up with me on 1/15 at 10:40. Has neuro f/up on 1/30 with neurology and f/up CT scheduled at same time.     John Moore MD  [facility] Resident

## 2024-01-05 NOTE — TELEPHONE ENCOUNTER
01/05/24  11:58 AM    Reached out to [facility] neurology to discuss pt's recent health changes, elevated BPs and goal for BP in context of recent ?cerebral edema. Awaiting call back.     John Moore MD  [facility] Resident

## 2024-01-15 ENCOUNTER — OFFICE VISIT (OUTPATIENT)
Age: 69
End: 2024-01-15
Payer: COMMERCIAL

## 2024-01-15 VITALS
HEIGHT: 69 IN | RESPIRATION RATE: 18 BRPM | DIASTOLIC BLOOD PRESSURE: 65 MMHG | TEMPERATURE: 98.1 F | BODY MASS INDEX: 32.26 KG/M2 | SYSTOLIC BLOOD PRESSURE: 94 MMHG | OXYGEN SATURATION: 94 % | WEIGHT: 217.8 LBS | HEART RATE: 93 BPM

## 2024-01-15 DIAGNOSIS — E87.6 HYPOKALEMIA: ICD-10-CM

## 2024-01-15 DIAGNOSIS — I10 BENIGN HYPERTENSION: Primary | ICD-10-CM

## 2024-01-15 DIAGNOSIS — R56.9 SEIZURES (HCC): ICD-10-CM

## 2024-01-15 PROCEDURE — 3074F SYST BP LT 130 MM HG: CPT | Performed by: STUDENT IN AN ORGANIZED HEALTH CARE EDUCATION/TRAINING PROGRAM

## 2024-01-15 PROCEDURE — 99214 OFFICE O/P EST MOD 30 MIN: CPT | Performed by: STUDENT IN AN ORGANIZED HEALTH CARE EDUCATION/TRAINING PROGRAM

## 2024-01-15 PROCEDURE — 3078F DIAST BP <80 MM HG: CPT | Performed by: STUDENT IN AN ORGANIZED HEALTH CARE EDUCATION/TRAINING PROGRAM

## 2024-01-15 PROCEDURE — 1123F ACP DISCUSS/DSCN MKR DOCD: CPT | Performed by: STUDENT IN AN ORGANIZED HEALTH CARE EDUCATION/TRAINING PROGRAM

## 2024-01-15 RX ORDER — TIZANIDINE 2 MG/1
2 TABLET ORAL EVERY 6 HOURS PRN
COMMUNITY
Start: 2023-12-19

## 2024-01-15 RX ORDER — SENNOSIDES A AND B 8.6 MG/1
2 TABLET, FILM COATED ORAL 2 TIMES DAILY
COMMUNITY

## 2024-01-15 RX ORDER — PREGABALIN 150 MG/1
150 CAPSULE ORAL 2 TIMES DAILY
COMMUNITY

## 2024-01-15 RX ORDER — OXYCODONE HYDROCHLORIDE 5 MG/1
5 TABLET ORAL EVERY 4 HOURS PRN
COMMUNITY

## 2024-01-15 RX ORDER — LAMOTRIGINE 100 MG/1
4 TABLET ORAL NIGHTLY
COMMUNITY

## 2024-01-15 RX ORDER — DEXAMETHASONE 1 MG
TABLET ORAL
COMMUNITY
Start: 2023-12-30 | End: 2024-01-15

## 2024-01-15 RX ORDER — FAMOTIDINE 20 MG/1
20 TABLET, FILM COATED ORAL 2 TIMES DAILY
COMMUNITY

## 2024-01-15 RX ORDER — ACETAMINOPHEN 325 MG/1
650 TABLET ORAL EVERY 6 HOURS PRN
COMMUNITY

## 2024-01-15 RX ORDER — LACOSAMIDE 100 MG/1
100 TABLET ORAL 2 TIMES DAILY
COMMUNITY
Start: 2023-12-30

## 2024-01-15 RX ORDER — OLANZAPINE 2.5 MG/1
2.5 TABLET, FILM COATED ORAL NIGHTLY
COMMUNITY
Start: 2023-12-30

## 2024-01-15 NOTE — PROGRESS NOTES
[address] [id number]   Office [phone], Fax [phone]    Subjective   CC:  Marii Nielsen is a 68 y.o. female who presents for hospital f/up.    Hospital f/up:   - Admitted three times in past several months for sEEG (implantation of electrodes 12/4)  removal of sEEG (explantation 12/18) - discharged on 12/19  - Had seizures again 12/21 - seen at [facility] ER and then d/c'ed  - Had another seizure 12/25 - seen at , transferred to [facility]  - At [facility], found to have increase in size of SDH, started on steroids  - During this admission, noted to have BP in the 160s/80s  - Ongoing seizures, last yesterday  - Has f/up 1/30 w [facility] neurology  - Still on Decadron, on a taper, last dose is next week    HTN:  - Spoke w pharmacist, who rec d/c HCTZ and uptitrate Lisinopril  - BP today is low, pt experiencing lightheadedness  - Took Lisinopril and HCTZ today    HM:  - H/o hysterectomy, unknown cervix status  - Last mammogram in June 2023  - Last DEXA June 2023  - Last colonoscopy 6 yrs ago    Complete ROS performed and pertinent positives/negatives are documented in HPI.    Past Medical History  Past Medical History:   Diagnosis Date    Arthritis     Seizure (HCC)        Current Medications  Current Outpatient Medications   Medication Sig Dispense Refill    acetaminophen (TYLENOL) 325 MG tablet Take 2 tablets by mouth every 6 hours as needed for Pain      famotidine (PEPCID) 20 MG tablet Take 1 tablet by mouth 2 times daily      lamoTRIgine (LAMICTAL) 100 MG tablet Take 4 tablets by mouth at bedtime      tiZANidine (ZANAFLEX) 2 MG tablet Take 1 tablet by mouth every 6 hours as needed      OLANZapine (ZYPREXA) 2.5 MG tablet Take 1 tablet by mouth nightly      lacosamide (VIMPAT) 100 MG TABS tablet Take 1 tablet by mouth 2 times daily.      oxyCODONE (ROXICODONE) 5 MG immediate release tablet Take 1 tablet by mouth every 4 hours as needed for Pain. Max Daily Amount: 30 mg      senna (SENOKOT) 8.6 MG

## 2024-01-15 NOTE — PROGRESS NOTES
Room: 12    Identified pt with two pt identifiers(name and ). Reviewed record in preparation for visit and have obtained necessary documentation.  Chief Complaint   Patient presents with    Follow-Up from Hospital     [place], [place], [facility] and [facility]     Nail Problem     C/O Yellow Discoloration of Fingernails post hospitalization discharge. Unsure if changes are related to medication         Health Maintenance Due   Topic    Colorectal Cancer Screen     Respiratory Syncytial Virus (RSV) Pregnant or age 60 yrs+ (1 - 1-dose 60+ series)    COVID-19 Vaccine ( season)       Vitals:    01/15/24 1059   BP: 94/65   Site: Left Upper Arm   Position: Sitting   Cuff Size: Large Adult   Pulse: 93   Resp: 18   Temp: 98.1 °F (36.7 °C)   TempSrc: Oral   SpO2: 94%   Weight: 98.8 kg (217 lb 12.8 oz)   Height: 1.753 m (5' 9\")           Coordination of Care Questionnaire:  :   1. Have you been to the ER, urgent care clinic since your last visit?  Hospitalized since your last visit? See Chief Complaint     2. Have you seen or consulted any other health care providers outside of the [facility] since your last visit?  Include any pap smears or colon screening. No    This patient is accompanied in the office by her spouse.  I have received verbal consent from Marii Nielsen to discuss any/all medical information while they are present in the room.

## 2024-01-16 LAB
ANION GAP SERPL CALC-SCNC: 9 MMOL/L (ref 5–15)
BUN SERPL-MCNC: 22 MG/DL (ref 6–20)
BUN/CREAT SERPL: 25 (ref 12–20)
CALCIUM SERPL-MCNC: 9.2 MG/DL (ref 8.5–10.1)
CHLORIDE SERPL-SCNC: 96 MMOL/L (ref 97–108)
CO2 SERPL-SCNC: 31 MMOL/L (ref 21–32)
CREAT SERPL-MCNC: 0.89 MG/DL (ref 0.55–1.02)
GLUCOSE SERPL-MCNC: 129 MG/DL (ref 65–100)
POTASSIUM SERPL-SCNC: 3.5 MMOL/L (ref 3.5–5.1)
SODIUM SERPL-SCNC: 136 MMOL/L (ref 136–145)

## 2024-01-24 ENCOUNTER — PATIENT MESSAGE (OUTPATIENT)
Age: 69
End: 2024-01-24

## 2024-01-24 DIAGNOSIS — I10 BENIGN HYPERTENSION: ICD-10-CM

## 2024-01-29 DIAGNOSIS — E87.6 HYPOKALEMIA: ICD-10-CM

## 2024-01-30 LAB
ANION GAP SERPL CALC-SCNC: 7 MMOL/L (ref 5–15)
BUN SERPL-MCNC: 17 MG/DL (ref 6–20)
BUN/CREAT SERPL: 20 (ref 12–20)
CALCIUM SERPL-MCNC: 9.5 MG/DL (ref 8.5–10.1)
CHLORIDE SERPL-SCNC: 104 MMOL/L (ref 97–108)
CO2 SERPL-SCNC: 30 MMOL/L (ref 21–32)
CREAT SERPL-MCNC: 0.83 MG/DL (ref 0.55–1.02)
GLUCOSE SERPL-MCNC: 140 MG/DL (ref 65–100)
POTASSIUM SERPL-SCNC: 3.7 MMOL/L (ref 3.5–5.1)
SODIUM SERPL-SCNC: 141 MMOL/L (ref 136–145)

## 2024-01-30 RX ORDER — LISINOPRIL 10 MG/1
10 TABLET ORAL DAILY
Qty: 30 TABLET | Refills: 0 | Status: SHIPPED | OUTPATIENT
Start: 2024-01-30

## 2024-01-30 NOTE — TELEPHONE ENCOUNTER
From: Marii Nielsen  To: John Moore MD  Sent: 1/24/2024 8:36 AM EST  Subject: Follow-up to 1/15/24 visit    Dr. Moore. Marii's Neuro-surgeon met with her yesterday after performing a CT scan. He said the scan showed the hematoma was now gone and suggested that her continuing symptoms are the result of a pinched nerve. He ordered a MRI of her spine on 2/9 and after I prodded him on the hematoma issue, he agreed to include the MRI of her head to rule out any brain issues. He also told her to stop the famotidine, olanzapine, hydrochlorothiazide, and potassium with a possible increase in the lisinopril for blood pressure if that is your recommendation. Marii is still concerned about the issues she is having and also possible swelling from stopping the hydrochlrorthiazide. We will get the blood draw to check the potassium level on 1/29 and wait to hear from you when you want to see her again to discuss everything

## 2024-02-06 ENCOUNTER — PATIENT MESSAGE (OUTPATIENT)
Age: 69
End: 2024-02-06

## 2024-02-06 DIAGNOSIS — I10 BENIGN HYPERTENSION: Primary | ICD-10-CM

## 2024-02-29 RX ORDER — HYDROCHLOROTHIAZIDE 25 MG/1
25 TABLET ORAL EVERY MORNING
Qty: 90 TABLET | Refills: 1 | Status: SHIPPED | OUTPATIENT
Start: 2024-02-29

## 2024-03-01 NOTE — TELEPHONE ENCOUNTER
Kylah De La Rosa MA 2/27/2024 4:12 PM EST      ----- Message -----  From: Marii Nielsen  Sent: 2/27/2024 2:48 PM EST  To: [facility] Clinical Staff  Subject: Recent Blood Test     No, she was not fasting in advance of this test    On another note, she has used all her Hydrochlorothiazide 25Mg water pills and [facility] won't refill. This med is not listed on my chart, although previously prescribed by LARA Stewart. [facility] you [facility]? She is now out of pills and experiencing some swelling.    Thanks

## 2024-03-23 DIAGNOSIS — I10 BENIGN HYPERTENSION: ICD-10-CM

## 2024-03-25 NOTE — TELEPHONE ENCOUNTER
Medication Refill Request    Marii Nielsen is requesting a refill of the following medication(s):   Requested Prescriptions     Pending Prescriptions Disp Refills    lisinopril (PRINIVIL;ZESTRIL) 10 MG tablet [Pharmacy Med Name: LISINOPRIL 10 MG TABLET] 30 tablet 0     Sig: TAKE 1 TABLET BY MOUTH EVERY DAY        Listed PCP is John Moore MD   Last provider to prescribe medication: Dr. Moore  Last Date of Medication Prescribed: 01/30/2024   Date of Last Office Visit at [facility]: 01/15/2024   Last Labs:  Lab Results   Component Value Date/Time     01/29/2024 10:12 AM    K 3.7 01/29/2024 10:12 AM     01/29/2024 10:12 AM    CO2 30 01/29/2024 10:12 AM    BUN 17 01/29/2024 10:12 AM    CREATININE 0.83 01/29/2024 10:12 AM    GLUCOSE 140 01/29/2024 10:12 AM    CALCIUM 9.5 01/29/2024 10:12 AM    LABGLOM >60 01/29/2024 10:12 AM        Future Appointment: No future appointments.    Refill Request Note: Recommended follow up at last office visit noted below  Return in about 2 weeks (around 1/29/2024) for 2-3 weeks for f/up HTN.    Please send refill to:    [facility]/pharmacy #[id number] - [place], [place] - [address] - P [phone] - F [phone]  [id number] [place] [address]  Phone: [phone] Fax: [phone]    [facility] #[id number] - [place], [place] - [address] - P [phone] - F [phone] [address]  [facility] [address]  Phone: [phone] Fax: [phone]

## 2024-03-26 RX ORDER — LISINOPRIL 10 MG/1
10 TABLET ORAL DAILY
Qty: 30 TABLET | Refills: 0 | Status: SHIPPED | OUTPATIENT
Start: 2024-03-26

## 2024-04-13 DIAGNOSIS — I10 BENIGN HYPERTENSION: ICD-10-CM

## 2024-04-15 NOTE — TELEPHONE ENCOUNTER
Medication Refill Request    Marii Nielsen is requesting a refill of the following medication(s):   Requested Prescriptions     Pending Prescriptions Disp Refills    lisinopril (PRINIVIL;ZESTRIL) 10 MG tablet [Pharmacy Med Name: LISINOPRIL 10 MG TABLET] 30 tablet 0     Sig: TAKE 1 TABLET BY MOUTH EVERY DAY        Listed PCP is John Moore MD   Last provider to prescribe medication: Dr. Moore  Last Date of Medication Prescribed: 03/26/2024   Date of Last Office Visit at [facility]: 01/15/2024 Dr. Moore    Last Labs:    Future Appointment: No future appointments.    Refill Request Note:   Patient was advised to follow up in 2 weeks for HTN follow up. No appointment scheduled at this time. A message will be forwarded to PSR to assist with scheduling.   Please send refill to:    [facility]/pharmacy #[id number] - [place], [place] - [address] - P [phone] - F [phone]  [address] [place] [address]  Phone: [phone] Fax: [phone]    [id number] [place], [place] - [address] - P [phone] - F [phone] [address]  [facility] [address]  Phone: [phone] Fax: [phone]      //

## 2024-04-15 NOTE — TELEPHONE ENCOUNTER
Fax Received - Prescription Refill Request    Pharmacy [facility] [address]    Medication: Effer K 20meq     Directions: Dissolve 1 tablet and take by mouth daily as directed     Last Prescribed 01/10/2024    Last Office Visit: 01/15/2024 - Dr. Moore

## 2024-04-17 DIAGNOSIS — I10 BENIGN HYPERTENSION: ICD-10-CM

## 2024-04-17 DIAGNOSIS — E87.6 HYPOKALEMIA: ICD-10-CM

## 2024-04-17 RX ORDER — LISINOPRIL 10 MG/1
10 TABLET ORAL DAILY
Qty: 30 TABLET | Refills: 0 | Status: SHIPPED | OUTPATIENT
Start: 2024-04-17

## 2024-04-17 RX ORDER — LISINOPRIL 10 MG/1
10 TABLET ORAL DAILY
Qty: 30 TABLET | Refills: 0 | OUTPATIENT
Start: 2024-04-17

## 2024-04-17 RX ORDER — POTASSIUM BICARBONATE 782 MG/1
TABLET, EFFERVESCENT ORAL
Qty: 90 TABLET | Refills: 1 | Status: SHIPPED | OUTPATIENT
Start: 2024-04-17

## 2024-04-19 ENCOUNTER — PATIENT MESSAGE (OUTPATIENT)
Age: 69
End: 2024-04-19

## 2024-04-22 NOTE — TELEPHONE ENCOUNTER
From: Marii Nielsen  To: John Moore MD  Sent: 4/19/2024 2:16 PM EDT  Subject: Refill for Effer-K Potassium    Previous prescription was filled at [facility], but new refill done at [facility]. They filled with an Orange Cream flavor which Marii cannot tolerate, yet [facility] says they have no other flavors. [facility] filled with a \"unflavored\" tablet to be dissolved in fruit juice, which Marii Prefers. Can you resubmit another prescription for this to [facility] on [address] for this type and be able to have it still covered by insurance?    Thanks in advance,

## 2024-04-25 RX ORDER — POTASSIUM BICARBONATE 782 MG/1
TABLET, EFFERVESCENT ORAL
Qty: 90 TABLET | Refills: 1 | Status: SHIPPED | OUTPATIENT
Start: 2024-04-25

## 2024-05-18 DIAGNOSIS — I10 BENIGN HYPERTENSION: ICD-10-CM

## 2024-05-22 DIAGNOSIS — I10 BENIGN HYPERTENSION: ICD-10-CM

## 2024-05-23 RX ORDER — LISINOPRIL 10 MG/1
10 TABLET ORAL DAILY
Qty: 30 TABLET | Refills: 0 | OUTPATIENT
Start: 2024-05-23

## 2024-05-29 DIAGNOSIS — I10 BENIGN HYPERTENSION: ICD-10-CM

## 2024-06-03 RX ORDER — LISINOPRIL 10 MG/1
10 TABLET ORAL DAILY
Qty: 30 TABLET | Refills: 0 | Status: SHIPPED | OUTPATIENT
Start: 2024-06-03

## 2024-06-04 RX ORDER — LISINOPRIL 10 MG/1
10 TABLET ORAL DAILY
Qty: 30 TABLET | Refills: 0 | OUTPATIENT
Start: 2024-06-04

## 2024-06-11 SDOH — ECONOMIC STABILITY: FOOD INSECURITY: WITHIN THE PAST 12 MONTHS, THE FOOD YOU BOUGHT JUST DIDN'T LAST AND YOU DIDN'T HAVE MONEY TO GET MORE.: NEVER TRUE

## 2024-06-11 SDOH — ECONOMIC STABILITY: FOOD INSECURITY: WITHIN THE PAST 12 MONTHS, YOU WORRIED THAT YOUR FOOD WOULD RUN OUT BEFORE YOU GOT MONEY TO BUY MORE.: NEVER TRUE

## 2024-06-11 SDOH — ECONOMIC STABILITY: INCOME INSECURITY: HOW HARD IS IT FOR YOU TO PAY FOR THE VERY BASICS LIKE FOOD, HOUSING, MEDICAL CARE, AND HEATING?: NOT VERY HARD

## 2024-06-11 SDOH — ECONOMIC STABILITY: TRANSPORTATION INSECURITY
IN THE PAST 12 MONTHS, HAS LACK OF TRANSPORTATION KEPT YOU FROM MEETINGS, WORK, OR FROM GETTING THINGS NEEDED FOR DAILY LIVING?: NO

## 2024-06-14 ENCOUNTER — OFFICE VISIT (OUTPATIENT)
Age: 69
End: 2024-06-14
Payer: COMMERCIAL

## 2024-06-14 VITALS
HEIGHT: 69 IN | OXYGEN SATURATION: 95 % | SYSTOLIC BLOOD PRESSURE: 91 MMHG | HEART RATE: 87 BPM | WEIGHT: 232 LBS | TEMPERATURE: 97.9 F | BODY MASS INDEX: 34.36 KG/M2 | DIASTOLIC BLOOD PRESSURE: 59 MMHG | RESPIRATION RATE: 18 BRPM

## 2024-06-14 DIAGNOSIS — Z87.891 PERSONAL HISTORY OF TOBACCO USE: ICD-10-CM

## 2024-06-14 DIAGNOSIS — Z12.11 COLON CANCER SCREENING: ICD-10-CM

## 2024-06-14 DIAGNOSIS — Z12.31 ENCOUNTER FOR SCREENING MAMMOGRAM FOR MALIGNANT NEOPLASM OF BREAST: ICD-10-CM

## 2024-06-14 DIAGNOSIS — R91.1 LUNG NODULE SEEN ON IMAGING STUDY: ICD-10-CM

## 2024-06-14 DIAGNOSIS — R73.9 HYPERGLYCEMIA: ICD-10-CM

## 2024-06-14 DIAGNOSIS — Z51.81 MEDICATION MONITORING ENCOUNTER: ICD-10-CM

## 2024-06-14 DIAGNOSIS — I10 BENIGN HYPERTENSION: Primary | ICD-10-CM

## 2024-06-14 PROCEDURE — 99214 OFFICE O/P EST MOD 30 MIN: CPT | Performed by: STUDENT IN AN ORGANIZED HEALTH CARE EDUCATION/TRAINING PROGRAM

## 2024-06-14 PROCEDURE — 3078F DIAST BP <80 MM HG: CPT | Performed by: FAMILY MEDICINE

## 2024-06-14 PROCEDURE — 1123F ACP DISCUSS/DSCN MKR DOCD: CPT | Performed by: FAMILY MEDICINE

## 2024-06-14 PROCEDURE — G0296 VISIT TO DETERM LDCT ELIG: HCPCS | Performed by: STUDENT IN AN ORGANIZED HEALTH CARE EDUCATION/TRAINING PROGRAM

## 2024-06-14 PROCEDURE — 3074F SYST BP LT 130 MM HG: CPT | Performed by: FAMILY MEDICINE

## 2024-06-14 RX ORDER — NALOXONE HYDROCHLORIDE 4 MG/.1ML
1 SPRAY NASAL PRN
COMMUNITY
Start: 2024-05-17

## 2024-06-14 RX ORDER — HYDROCHLOROTHIAZIDE 12.5 MG/1
12.5 TABLET ORAL EVERY MORNING
Qty: 90 TABLET | Refills: 0 | Status: SHIPPED | OUTPATIENT
Start: 2024-06-14

## 2024-06-14 RX ORDER — ACETAMINOPHEN 500 MG
1000 TABLET ORAL EVERY 6 HOURS PRN
COMMUNITY

## 2024-06-14 RX ORDER — METHOCARBAMOL 750 MG/1
750 TABLET, FILM COATED ORAL DAILY
COMMUNITY
Start: 2024-05-17

## 2024-06-14 RX ORDER — ASPIRIN 81 MG/1
162 TABLET, COATED ORAL DAILY
COMMUNITY
Start: 2024-05-16

## 2024-06-14 RX ORDER — MELOXICAM 15 MG/1
15 TABLET ORAL PRN
COMMUNITY
Start: 2024-05-29 | End: 2024-06-14

## 2024-06-14 RX ORDER — LISINOPRIL 5 MG/1
5 TABLET ORAL DAILY
Qty: 90 TABLET | Refills: 0 | Status: SHIPPED | OUTPATIENT
Start: 2024-06-14

## 2024-06-14 SDOH — ECONOMIC STABILITY: FOOD INSECURITY: WITHIN THE PAST 12 MONTHS, THE FOOD YOU BOUGHT JUST DIDN'T LAST AND YOU DIDN'T HAVE MONEY TO GET MORE.: NEVER TRUE

## 2024-06-14 SDOH — ECONOMIC STABILITY: FOOD INSECURITY: WITHIN THE PAST 12 MONTHS, YOU WORRIED THAT YOUR FOOD WOULD RUN OUT BEFORE YOU GOT MONEY TO BUY MORE.: NEVER TRUE

## 2024-06-14 SDOH — ECONOMIC STABILITY: INCOME INSECURITY: HOW HARD IS IT FOR YOU TO PAY FOR THE VERY BASICS LIKE FOOD, HOUSING, MEDICAL CARE, AND HEATING?: NOT HARD AT ALL

## 2024-06-14 ASSESSMENT — PATIENT HEALTH QUESTIONNAIRE - PHQ9
SUM OF ALL RESPONSES TO PHQ QUESTIONS 1-9: 0
8. MOVING OR SPEAKING SO SLOWLY THAT OTHER PEOPLE COULD HAVE NOTICED. OR THE OPPOSITE, BEING SO FIGETY OR RESTLESS THAT YOU HAVE BEEN MOVING AROUND A LOT MORE THAN USUAL: NOT AT ALL
SUM OF ALL RESPONSES TO PHQ9 QUESTIONS 1 & 2: 0
9. THOUGHTS THAT YOU WOULD BE BETTER OFF DEAD, OR OF HURTING YOURSELF: NOT AT ALL
SUM OF ALL RESPONSES TO PHQ QUESTIONS 1-9: 0
4. FEELING TIRED OR HAVING LITTLE ENERGY: NOT AT ALL
7. TROUBLE CONCENTRATING ON THINGS, SUCH AS READING THE NEWSPAPER OR WATCHING TELEVISION: NOT AT ALL
10. IF YOU CHECKED OFF ANY PROBLEMS, HOW DIFFICULT HAVE THESE PROBLEMS MADE IT FOR YOU TO DO YOUR WORK, TAKE CARE OF THINGS AT HOME, OR GET ALONG WITH OTHER PEOPLE: NOT DIFFICULT AT ALL
1. LITTLE INTEREST OR PLEASURE IN DOING THINGS: NOT AT ALL
3. TROUBLE FALLING OR STAYING ASLEEP: NOT AT ALL
5. POOR APPETITE OR OVEREATING: NOT AT ALL
2. FEELING DOWN, DEPRESSED OR HOPELESS: NOT AT ALL
SUM OF ALL RESPONSES TO PHQ QUESTIONS 1-9: 0
6. FEELING BAD ABOUT YOURSELF - OR THAT YOU ARE A FAILURE OR HAVE LET YOURSELF OR YOUR FAMILY DOWN: NOT AT ALL
SUM OF ALL RESPONSES TO PHQ QUESTIONS 1-9: 0

## 2024-06-14 ASSESSMENT — ANXIETY QUESTIONNAIRES
4. TROUBLE RELAXING: NOT AT ALL
1. FEELING NERVOUS, ANXIOUS, OR ON EDGE: NOT AT ALL
7. FEELING AFRAID AS IF SOMETHING AWFUL MIGHT HAPPEN: NOT AT ALL
3. WORRYING TOO MUCH ABOUT DIFFERENT THINGS: NOT AT ALL
IF YOU CHECKED OFF ANY PROBLEMS ON THIS QUESTIONNAIRE, HOW DIFFICULT HAVE THESE PROBLEMS MADE IT FOR YOU TO DO YOUR WORK, TAKE CARE OF THINGS AT HOME, OR GET ALONG WITH OTHER PEOPLE: NOT DIFFICULT AT ALL
6. BECOMING EASILY ANNOYED OR IRRITABLE: NOT AT ALL
GAD7 TOTAL SCORE: 0
2. NOT BEING ABLE TO STOP OR CONTROL WORRYING: NOT AT ALL
5. BEING SO RESTLESS THAT IT IS HARD TO SIT STILL: NOT AT ALL

## 2024-06-14 NOTE — PROGRESS NOTES
[address] [place] [address]   Office [phone], Fax [phone]    Subjective   CC:  Marii Nielsen is a 68 y.o. female who presents for f/up HTN.    HTN:  - Tolerating Lisinopril, HCTZ well  - No lightheadedness, dizziness, sxs hypotension    Complete ROS performed and pertinent positives/negatives are documented in HPI.    Past Medical History  Past Medical History:   Diagnosis Date    Arthritis     Seizure (HCC)        Current Medications  Current Outpatient Medications   Medication Sig Dispense Refill    acetaminophen (TYLENOL) 500 MG tablet Take 2 tablets by mouth every 6 hours as needed for Pain      ASPIRIN LOW DOSE 81 MG EC tablet Take 2 tablets by mouth daily      methocarbamol (ROBAXIN) 750 MG tablet Take 1 tablet by mouth daily      naloxone 4 MG/0.1ML LIQD nasal spray 1 spray by Nasal route as needed      hydroCHLOROthiazide 12.5 MG tablet Take 1 tablet by mouth every morning 90 tablet 0    lisinopril (PRINIVIL;ZESTRIL) 5 MG tablet Take 1 tablet by mouth daily 90 tablet 0    potassium bicarb-citric acid (EFFER-K) 20 MEQ TBEF effervescent tablet Dissolve 1 Tablet and take by mouth daily as directed 90 tablet 1    lamoTRIgine (LAMICTAL) 100 MG tablet Take 4 tablets by mouth at bedtime      tiZANidine (ZANAFLEX) 2 MG tablet Take 1 tablet by mouth every 6 hours as needed      lacosamide (VIMPAT) 100 MG TABS tablet Take 1 tablet by mouth 2 times daily.      oxyCODONE (ROXICODONE) 5 MG immediate release tablet Take 2 tablets by mouth every 4 hours as needed for Pain.      senna (SENOKOT) 8.6 MG tablet Take 2 tablets by mouth 2 times daily      pregabalin (LYRICA) 150 MG capsule Take 1 capsule by mouth 2 times daily.      atorvastatin (LIPITOR) 40 MG tablet TAKE 1 TABLET BY MOUTH EVERY DAY 90 tablet 3    Multiple Vitamins-Minerals (WOMENS MULTIVITAMIN PO) Take 1 tablet by mouth daily      D-MANNOSE PO Take by mouth every morning With cranberry      nitrofurantoin,

## 2024-06-14 NOTE — PROGRESS NOTES
Identified pt with two pt identifiers(name and ). Reviewed record in preparation for visit and have obtained necessary documentation.  Chief Complaint   Patient presents with    Follow-up Chronic Condition     Pt would also like to discuss her medications to see which ones she can stop.         Health Maintenance Due   Topic    Colorectal Cancer Screen     Respiratory Syncytial Virus (RSV) Pregnant or age 60 yrs+ (1 - 1-dose 60+ series)    COVID-19 Vaccine ( season)    Low dose CT lung screening &/or counseling     Lipids     Depression Monitoring        Vitals:    24 1338 24 1353   BP: (!) 87/57 (!) 91/59   Site: Right Upper Arm Left Upper Arm   Position: Sitting Sitting   Cuff Size: Large Adult Large Adult   Pulse: 80 87   Resp: 18    Temp: 97.9 °F (36.6 °C)    TempSrc: Oral    SpO2: 95%    Weight: 105.2 kg (232 lb)    Height: 1.753 m (5' 9\")          \"Have you been to the ER, urgent care clinic since your last visit?  Hospitalized since your last visit?\"    NO    “Have you seen or consulted any other health care providers outside of [facility] since your last visit?”    Yes, Dr[facility]        “Have you had a colorectal cancer screening such as a colonoscopy/FIT/Cologuard?    NO    No colonoscopy on file  No cologuard on file  No FIT/FOBT on file   No flexible sigmoidoscopy on file         Click Here for Release of Records Request     This patient is accompanied in the office by her spouse.  I have received verbal consent from Marii Nielsen to discuss any/all medical information while they are present in the room.

## 2024-06-14 NOTE — PATIENT INSTRUCTIONS
Please call [phone] to schedule your imaging appointment.     Learning About Lung Cancer Screening  What is screening for lung cancer?     Lung cancer screening is a way to find some lung cancers early, before a person has any symptoms of the cancer.  Lung cancer screening may help those who have the highest risk for lung cancer--people age 50 and older who are or were heavy smokers. For most people, who aren't at increased risk, screening for lung cancer probably isn't helpful.  Screening won't prevent cancer. And it may not find all lung cancers. Lung cancer screening may lower the risk of dying from lung cancer in a small number of people.  How is it done?  Lung cancer screening is done with a low-dose CT (computed tomography) scan. A CT scan uses X-rays, or radiation, to make detailed pictures of your body. Experts recommend that screening be done in medical centers that focus on finding and treating lung cancer.  Who is screening recommended for?  Lung cancer screening is recommended for people age 50 and older who are or were heavy smokers. That means people with a smoking history of at least 20 pack years. A pack year is a way to measure how heavy a smoker you are or were.  To figure out your pack years, multiply how many packs a day on average (assuming 20 cigarettes per pack) you have smoked by how many years you have smoked. For example:  If you smoked 1 pack a day for 20 years, that's 1 times 20. So you have a smoking history of 20 pack years.  If you smoked 2 packs a day for 10 years, that's 2 times 10. So you have a smoking history of 20 pack years.  Experts agree that screening is for people who have a high risk of lung cancer. But experts don't agree on what high risk means. Some say people age 50 or older with at least a 20-pack-year smoking history are high risk. Others say it's people age 55 or older with a 30-pack-year history.  To see if you could benefit from screening, first find out if

## 2024-06-15 LAB
ALBUMIN SERPL-MCNC: 3.9 G/DL (ref 3.5–5)
ALBUMIN/GLOB SERPL: 1.6 (ref 1.1–2.2)
ALP SERPL-CCNC: 148 U/L (ref 45–117)
ALT SERPL-CCNC: 21 U/L (ref 12–78)
ANION GAP SERPL CALC-SCNC: 6 MMOL/L (ref 5–15)
AST SERPL-CCNC: 12 U/L (ref 15–37)
BILIRUB SERPL-MCNC: 0.6 MG/DL (ref 0.2–1)
BUN SERPL-MCNC: 22 MG/DL (ref 6–20)
BUN/CREAT SERPL: 32 (ref 12–20)
CALCIUM SERPL-MCNC: 9.3 MG/DL (ref 8.5–10.1)
CHLORIDE SERPL-SCNC: 100 MMOL/L (ref 97–108)
CHOLEST SERPL-MCNC: 151 MG/DL
CO2 SERPL-SCNC: 32 MMOL/L (ref 21–32)
CREAT SERPL-MCNC: 0.69 MG/DL (ref 0.55–1.02)
EST. AVERAGE GLUCOSE BLD GHB EST-MCNC: 100 MG/DL
GLOBULIN SER CALC-MCNC: 2.5 G/DL (ref 2–4)
GLUCOSE SERPL-MCNC: 96 MG/DL (ref 65–100)
HBA1C MFR BLD: 5.1 % (ref 4–5.6)
HDLC SERPL-MCNC: 53 MG/DL
HDLC SERPL: 2.8 (ref 0–5)
LDLC SERPL CALC-MCNC: 59.6 MG/DL (ref 0–100)
POTASSIUM SERPL-SCNC: 3.5 MMOL/L (ref 3.5–5.1)
PROT SERPL-MCNC: 6.4 G/DL (ref 6.4–8.2)
SODIUM SERPL-SCNC: 138 MMOL/L (ref 136–145)
TRIGL SERPL-MCNC: 192 MG/DL
VLDLC SERPL CALC-MCNC: 38.4 MG/DL

## 2024-06-16 LAB — TSH SERPL DL<=0.05 MIU/L-ACNC: 1.34 UIU/ML (ref 0.45–4.5)

## 2024-07-06 DIAGNOSIS — E78.5 HYPERLIPIDEMIA, UNSPECIFIED: ICD-10-CM

## 2024-07-08 RX ORDER — ATORVASTATIN CALCIUM 40 MG/1
40 TABLET, FILM COATED ORAL DAILY
Qty: 90 TABLET | Refills: 1 | Status: SHIPPED | OUTPATIENT
Start: 2024-07-08 | End: 2025-01-04

## 2024-07-10 DIAGNOSIS — Z12.11 SCREENING FOR COLON CANCER: Primary | ICD-10-CM

## 2024-07-10 NOTE — PROGRESS NOTES
[facility]     Received patient message requesting cologaurd for colon cancer screening. Order provided.     Khadra Kerr MD

## 2024-07-23 LAB — NONINV COLON CA DNA+OCC BLD SCRN STL QL: NEGATIVE

## 2024-07-24 ENCOUNTER — HOSPITAL ENCOUNTER (OUTPATIENT)
Facility: HOSPITAL | Age: 69
Discharge: HOME | End: 2024-07-27
Attending: STUDENT IN AN ORGANIZED HEALTH CARE EDUCATION/TRAINING PROGRAM
Payer: COMMERCIAL

## 2024-07-24 VITALS — HEIGHT: 69 IN | BODY MASS INDEX: 34.36 KG/M2 | WEIGHT: 232 LBS

## 2024-07-24 DIAGNOSIS — Z87.891 PERSONAL HISTORY OF TOBACCO USE: ICD-10-CM

## 2024-07-24 DIAGNOSIS — Z12.31 ENCOUNTER FOR SCREENING MAMMOGRAM FOR MALIGNANT NEOPLASM OF BREAST: ICD-10-CM

## 2024-07-24 PROCEDURE — 71271 CT THORAX LUNG CANCER SCR C-: CPT

## 2024-07-24 PROCEDURE — 77067 SCR MAMMO BI INCL CAD: CPT

## 2024-08-27 ENCOUNTER — PATIENT MESSAGE (OUTPATIENT)
Age: 69
End: 2024-08-27

## 2024-08-27 DIAGNOSIS — I10 BENIGN HYPERTENSION: ICD-10-CM

## 2024-08-28 RX ORDER — HYDROCHLOROTHIAZIDE 12.5 MG/1
12.5 TABLET ORAL EVERY MORNING
Qty: 90 TABLET | Refills: 3 | Status: SHIPPED | OUTPATIENT
Start: 2024-08-28

## 2024-08-28 NOTE — TELEPHONE ENCOUNTER
[facility]     Reviewed chart & Will send 1 year supply of HCTZ.     Khadra Kerr MD

## 2024-08-28 NOTE — TELEPHONE ENCOUNTER
Medication Refill Request    Marii Nielsen is requesting a refill of the following medication(s):   Requested Prescriptions     Pending Prescriptions Disp Refills    hydroCHLOROthiazide 12.5 MG tablet 90 tablet 0     Sig: Take 1 tablet by mouth every morning        Listed PCP is Khadra Kerr MD   Last provider to prescribe medication: Dr. Moore  Last Date of Medication Prescribed: 06/14/2024   Date of Last Office Visit at [facility]: 06/14/2024   Last Labs:  Lab Results   Component Value Date     06/14/2024    K 3.5 06/14/2024     06/14/2024    CO2 32 06/14/2024    BUN 22 (H) 06/14/2024    CREATININE 0.69 06/14/2024    GLUCOSE 96 06/14/2024    CALCIUM 9.3 06/14/2024    BILITOT 0.6 06/14/2024    ALKPHOS 148 (H) 06/14/2024    AST 12 (L) 06/14/2024    ALT 21 06/14/2024    LABGLOM >90 06/14/2024    GFRAA >60 03/09/2022    AGRATIO 1.4 06/25/2021    GLOB 2.5 06/14/2024         Future Appointment: No future appointments.      Please send refill to:    [facility]/pharmacy #[id number] - [place], [place] - [address] - P [phone] - F [phone]  [address] [place] [address]  Phone: [phone] Fax: [phone]    [facility] #[id number] - [place], [place] - [address] - P [phone] - F [phone] [address]  [facility] [address]  Phone: [phone] Fax: [phone]

## 2024-09-24 ENCOUNTER — OFFICE VISIT (OUTPATIENT)
Age: 69
End: 2024-09-24

## 2024-09-24 VITALS
BODY MASS INDEX: 34.26 KG/M2 | DIASTOLIC BLOOD PRESSURE: 78 MMHG | TEMPERATURE: 98 F | WEIGHT: 232 LBS | OXYGEN SATURATION: 97 % | SYSTOLIC BLOOD PRESSURE: 120 MMHG | HEART RATE: 84 BPM

## 2024-09-24 DIAGNOSIS — E66.9 CLASS 1 OBESITY WITH SERIOUS COMORBIDITY AND BODY MASS INDEX (BMI) OF 34.0 TO 34.9 IN ADULT, UNSPECIFIED OBESITY TYPE: ICD-10-CM

## 2024-09-24 DIAGNOSIS — F32.A MILD DEPRESSION: ICD-10-CM

## 2024-09-24 DIAGNOSIS — M19.90 ARTHRITIS: Primary | ICD-10-CM

## 2024-09-24 ASSESSMENT — PATIENT HEALTH QUESTIONNAIRE - PHQ9
4. FEELING TIRED OR HAVING LITTLE ENERGY: NEARLY EVERY DAY
2. FEELING DOWN, DEPRESSED OR HOPELESS: NOT AT ALL
5. POOR APPETITE OR OVEREATING: MORE THAN HALF THE DAYS
10. IF YOU CHECKED OFF ANY PROBLEMS, HOW DIFFICULT HAVE THESE PROBLEMS MADE IT FOR YOU TO DO YOUR WORK, TAKE CARE OF THINGS AT HOME, OR GET ALONG WITH OTHER PEOPLE: NOT DIFFICULT AT ALL
6. FEELING BAD ABOUT YOURSELF - OR THAT YOU ARE A FAILURE OR HAVE LET YOURSELF OR YOUR FAMILY DOWN: NOT AT ALL
3. TROUBLE FALLING OR STAYING ASLEEP: MORE THAN HALF THE DAYS
8. MOVING OR SPEAKING SO SLOWLY THAT OTHER PEOPLE COULD HAVE NOTICED. OR THE OPPOSITE, BEING SO FIGETY OR RESTLESS THAT YOU HAVE BEEN MOVING AROUND A LOT MORE THAN USUAL: SEVERAL DAYS
SUM OF ALL RESPONSES TO PHQ QUESTIONS 1-9: 8
SUM OF ALL RESPONSES TO PHQ QUESTIONS 1-9: 8
7. TROUBLE CONCENTRATING ON THINGS, SUCH AS READING THE NEWSPAPER OR WATCHING TELEVISION: NOT AT ALL
1. LITTLE INTEREST OR PLEASURE IN DOING THINGS: NOT AT ALL
9. THOUGHTS THAT YOU WOULD BE BETTER OFF DEAD, OR OF HURTING YOURSELF: NOT AT ALL
SUM OF ALL RESPONSES TO PHQ QUESTIONS 1-9: 8
SUM OF ALL RESPONSES TO PHQ QUESTIONS 1-9: 8
SUM OF ALL RESPONSES TO PHQ9 QUESTIONS 1 & 2: 0

## 2024-09-26 RX ORDER — ESCITALOPRAM OXALATE 5 MG/1
5 TABLET ORAL DAILY
Qty: 30 TABLET | Refills: 1 | Status: SHIPPED | OUTPATIENT
Start: 2024-09-26

## 2024-10-06 LAB
14-3-3 ETA AG SER IA-MCNC: <0.2 NG/ML
CCP IGA+IGG SERPL IA-ACNC: <20 UNITS

## 2024-10-07 LAB
14-3-3 ETA AG SER IA-MCNC: <0.2 NG/ML
CCP IGA+IGG SERPL IA-ACNC: <20 UNITS
RHEUMATOID FACT SERPL-ACNC: <14 UNITS/ML

## 2024-10-15 ENCOUNTER — PATIENT MESSAGE (OUTPATIENT)
Age: 69
End: 2024-10-15

## 2024-10-15 DIAGNOSIS — I10 BENIGN HYPERTENSION: ICD-10-CM

## 2024-10-15 DIAGNOSIS — E78.5 HYPERLIPIDEMIA, UNSPECIFIED: ICD-10-CM

## 2024-10-15 NOTE — TELEPHONE ENCOUNTER
Medication Refill Request    Marii Nielsen is requesting a refill of the following medication(s):   Requested Prescriptions     Pending Prescriptions Disp Refills    lisinopril (PRINIVIL;ZESTRIL) 5 MG tablet 90 tablet 0     Sig: Take 1 tablet by mouth daily     Lab Results   Component Value Date    WBC 6.4 12/21/2023    HGB 12.8 12/21/2023    HCT 37.3 12/21/2023    MCV 91.0 12/21/2023     12/21/2023     Lab Results   Component Value Date     06/14/2024    K 3.5 06/14/2024     06/14/2024    CO2 32 06/14/2024    BUN 22 (H) 06/14/2024    CREATININE 0.69 06/14/2024    GLUCOSE 96 06/14/2024    CALCIUM 9.3 06/14/2024    BILITOT 0.6 06/14/2024    ALKPHOS 148 (H) 06/14/2024    AST 12 (L) 06/14/2024    ALT 21 06/14/2024    LABGLOM >90 06/14/2024    GFRAA >60 03/09/2022    AGRATIO 1.4 06/25/2021    GLOB 2.5 06/14/2024     Wt Readings from Last 3 Encounters:   09/24/24 105.2 kg (232 lb)   07/24/24 105.2 kg (232 lb)   06/14/24 105.2 kg (232 lb)     Temp Readings from Last 3 Encounters:   09/24/24 98 °F (36.7 °C) (Oral)   06/14/24 97.9 °F (36.6 °C) (Oral)   01/15/24 98.1 °F (36.7 °C) (Oral)     BP Readings from Last 3 Encounters:   09/24/24 120/78   06/14/24 (!) 91/59   01/15/24 94/65     Pulse Readings from Last 3 Encounters:   09/24/24 84   06/14/24 87   01/15/24 93       Listed PCP is Khadra Kerr MD   Last provider to prescribe medication: Dr. Moore on 06/14/24  Date of Last Office Visit at [facility]: 09/24/24 with Dr. Kerr    FUTURE APPOINTMENT: No future appointments.    Please send refill to:    [facility]/pharmacy #[id number] - [place], [place] - [address] - P [phone] - F [phone]  [address] [address]  Phone: [phone] Fax: [phone]    [facility] [employer] ST[facility] #[id number] - [place], [place] - [address] - P [phone] - F [phone]  [address]  [facility] [address]  Phone: [phone] Fax: [phone]      Please review request and approve or deny with

## 2024-10-16 RX ORDER — LISINOPRIL 5 MG/1
5 TABLET ORAL DAILY
Qty: 90 TABLET | Refills: 3 | Status: SHIPPED | OUTPATIENT
Start: 2024-10-16

## 2024-10-16 RX ORDER — ATORVASTATIN CALCIUM 40 MG/1
40 TABLET, FILM COATED ORAL DAILY
Qty: 90 TABLET | Refills: 3 | Status: SHIPPED | OUTPATIENT
Start: 2024-10-16

## 2024-10-18 RX ORDER — POTASSIUM BICARBONATE 782 MG/1
TABLET, EFFERVESCENT ORAL
Qty: 90 TABLET | Refills: 3 | Status: SHIPPED | OUTPATIENT
Start: 2024-10-18

## 2024-10-18 NOTE — TELEPHONE ENCOUNTER
Medication Refill Request    Marii Nielsen is requesting a refill of the following medication(s):   Requested Prescriptions     Pending Prescriptions Disp Refills    potassium bicarb-citric acid (EFFER-K) 20 MEQ TBEF effervescent tablet 90 tablet 1     Sig: Dissolve 1 Tablet and take by mouth daily as directed        Listed PCP is Khadra Kerr MD   Last provider to prescribe medication: Dr. Moore on 4/25/24  Date of Last Office Visit at [facility]: 9/24/24 with Dr. Kerr    FUTURE APPOINTMENT: No future appointments.    Please send refill to:    [facility]/pharmacy #[id number] - [place], [place] - [address] - P [phone] - F [phone]  [address] [place] [address]  Phone: [phone] Fax: [phone]    [facility] #[id number] [place], [place] - [address] - P [phone] - F [phone] [address]  [facility] [address]  Phone: [phone] Fax: [phone]      Please review request and approve or deny with recommendations within 48 hours.

## 2024-10-20 DIAGNOSIS — F32.A MILD DEPRESSION: ICD-10-CM

## 2024-10-21 RX ORDER — ESCITALOPRAM OXALATE 5 MG/1
5 TABLET ORAL DAILY
Qty: 90 TABLET | Refills: 1 | OUTPATIENT
Start: 2024-10-21

## 2024-10-21 NOTE — TELEPHONE ENCOUNTER
Medication Refill Request    Marii Nielsen is requesting a refill of the following medication(s):   Requested Prescriptions     Pending Prescriptions Disp Refills    escitalopram (LEXAPRO) 5 MG tablet [Pharmacy Med Name: ESCITALOPRAM 5 MG TABLET] 90 tablet 1     Sig: TAKE 1 TABLET BY MOUTH EVERY DAY        Listed PCP is Khadra Kerr MD   Last provider to prescribe medication: Dr. Kerr  Last Date of Medication Prescribed: 09/26/2024   Date of Last Office Visit at [facility]: 09/24/2024  Future Appointment: No future appointments.    Refill Request Note: Recommended to follow up in 4 weeks for Depression Follow Up. Encounter forwarded to PSR to assist with scheduling a follow up appointment     Please send refill to:    [facility]/pharmacy #[id number] - [place], [place] - [address] - P [phone] - F [phone]  [address] [place] [address]  Phone: [phone] Fax: [phone]    [facility] #[id number] - [place], [place] - [address] - P [phone] - F [phone] [address] [facility] [address]  Phone: [phone] Fax: [phone]

## 2024-10-28 ENCOUNTER — OFFICE VISIT (OUTPATIENT)
Age: 69
End: 2024-10-28

## 2024-10-28 VITALS
HEART RATE: 75 BPM | WEIGHT: 239.8 LBS | RESPIRATION RATE: 18 BRPM | HEIGHT: 69 IN | DIASTOLIC BLOOD PRESSURE: 79 MMHG | OXYGEN SATURATION: 93 % | SYSTOLIC BLOOD PRESSURE: 142 MMHG | TEMPERATURE: 97.6 F | BODY MASS INDEX: 35.52 KG/M2

## 2024-10-28 DIAGNOSIS — R20.0 NUMBNESS AND TINGLING OF LEFT SIDE OF FACE: ICD-10-CM

## 2024-10-28 DIAGNOSIS — N76.0 BACTERIAL VAGINOSIS: Primary | ICD-10-CM

## 2024-10-28 DIAGNOSIS — R30.0 DYSURIA: ICD-10-CM

## 2024-10-28 DIAGNOSIS — B96.89 BACTERIAL VAGINOSIS: Primary | ICD-10-CM

## 2024-10-28 DIAGNOSIS — R20.2 NUMBNESS AND TINGLING OF LEFT SIDE OF FACE: ICD-10-CM

## 2024-10-28 DIAGNOSIS — N89.8 VAGINAL DISCHARGE: ICD-10-CM

## 2024-10-28 LAB
BILIRUBIN, URINE, POC: NEGATIVE
BLOOD URINE, POC: NEGATIVE
GLUCOSE URINE, POC: NEGATIVE
KETONES, URINE, POC: NEGATIVE
LEUKOCYTE ESTERASE, URINE, POC: NORMAL
NITRITE, URINE, POC: NEGATIVE
PH, URINE, POC: 6 (ref 4.6–8)
PROTEIN,URINE, POC: NEGATIVE
SPECIFIC GRAVITY, URINE, POC: 1.01 (ref 1–1.03)
URINALYSIS CLARITY, POC: NORMAL
URINALYSIS COLOR, POC: YELLOW
UROBILINOGEN, POC: NORMAL MG/DL
WET PREP (POC): NORMAL

## 2024-10-28 RX ORDER — METRONIDAZOLE 500 MG/1
500 TABLET ORAL 2 TIMES DAILY
Qty: 14 TABLET | Refills: 0 | Status: SHIPPED | OUTPATIENT
Start: 2024-10-28 | End: 2024-11-04

## 2024-10-28 ASSESSMENT — PATIENT HEALTH QUESTIONNAIRE - PHQ9
SUM OF ALL RESPONSES TO PHQ9 QUESTIONS 1 & 2: 0
SUM OF ALL RESPONSES TO PHQ QUESTIONS 1-9: 0
2. FEELING DOWN, DEPRESSED OR HOPELESS: NOT AT ALL
1. LITTLE INTEREST OR PLEASURE IN DOING THINGS: NOT AT ALL
SUM OF ALL RESPONSES TO PHQ QUESTIONS 1-9: 0

## 2024-10-28 NOTE — PROGRESS NOTES
I saw and evaluated the patient, performing the key elements of the service.  I discussed the findings, assessment and plan with the resident and agree with the resident's findings and plan as documented in the resident's note.    
Identified pt with two pt identifiers(name and ). Reviewed record in preparation for visit and have obtained necessary documentation.  Chief Complaint   Patient presents with    Urinary Frequency    Urinary Pain    Vaginal Itching   Pt states she is having the above symptoms.  She also states she is having yellow/dark colored discharge that started a week ago.  Pt states that the discharge has a foul odor to it.      Health Maintenance Due   Topic    Respiratory Syncytial Virus (RSV) Pregnant or age 60 yrs+ (1 - 1-dose 60+ series)    Flu vaccine (1)    COVID-19 Vaccine ( season)       Vitals:    10/28/24 0852 10/28/24 0913   BP: (!) 150/84 (!) 142/79   Site: Right Upper Arm Left Upper Arm   Position: Sitting Sitting   Cuff Size: Large Adult Large Adult   Pulse: 75 75   Resp: 18    Temp: 97.6 °F (36.4 °C)    TempSrc: Oral    SpO2: 93%    Weight: 108.8 kg (239 lb 12.8 oz)    Height: 1.753 m (5' 9\")          \"Have you been to the ER, urgent care clinic since your last visit?  Hospitalized since your last visit?\"    Yes, 24, [facility] ER for fx ankle     “Have you seen or consulted any other health care providers outside of [facility] since your last visit?”    Yes, 10/25/24,PT. 10/2024 [facility]             Click Here for Release of Records Request     This patient is accompanied in the office by her spouse.  I have received verbal consent from Marii Nielsen to discuss any/all medical information while they are present in the room.  
CN5 maxillary distribution. No nystagmus,  face symmetric, tongue/palate midline. Strength 5/5 all four extremities. Sensation intact in all extremities. Steady gait.   Pelvic: Normal appearing external female genitalia, normal vaginal epithelium, white abnormal discharge. Normal appearing cervix. Chaperoned by Attending    No results found for this or any previous visit (from the past 24 hour(s)).      Assessment and Plan:  1. Bacterial vaginosis: acute, uncontrolled. Patient reports symptoms began ~1 week ago.   -     metroNIDAZOLE (FLAGYL) 500 MG tablet; Take 1 tablet by mouth 2 times daily for 7 days, Disp-14 tablet, R-0Normal  2. Numbness and tingling of left side of face: acute, intermittent. PE notable for paraesthesia on left CN5 maxillary distribution. Symptoms are most noticeable in right after waking up & before going to bed. Patient last saw a dentist earlier this year. DDX atypical trigeminal neuralgia; unlikely stroke due to symptoms being intermittent.   -     MRI ORBITS FACE NECK W WO CONTRAST; Future  - recommended to call neurologist to inform them about symptoms   - recommended considering revising dentist to rule out dental origin  3. Dysuria  -     AMB POC URINALYSIS DIP STICK AUTO W/ MICRO  -     Culture, Urine; Future  4. Vaginal discharge  -     Smear, Wet Mount, Saline ([id number])      No follow-up provider specified.    I have discussed the aforementioned diagnoses and plan with the patient in detail. I have provided information in person and/or in AVS. All questions answered prior to discharge.     I discussed and seen this patient with Dr. Spears (Attending Physician)   Signed By:  Khadra Kerr MD     Family Medicine Resident

## 2024-10-29 LAB
BACTERIA SPEC CULT: NORMAL
SERVICE CMNT-IMP: NORMAL

## 2024-10-30 NOTE — RESULT ENCOUNTER NOTE
POC wet mount is notable for clue cells, POC UA unremakrable - results discussed during visit  Ucx is negative    Khadra Kerr MD

## 2024-11-06 ENCOUNTER — HOSPITAL ENCOUNTER (OUTPATIENT)
Facility: HOSPITAL | Age: 69
Discharge: HOME | End: 2024-11-09
Payer: COMMERCIAL

## 2024-11-06 VITALS — BODY MASS INDEX: 35.29 KG/M2 | WEIGHT: 239 LBS

## 2024-11-06 DIAGNOSIS — R20.0 NUMBNESS AND TINGLING OF LEFT SIDE OF FACE: ICD-10-CM

## 2024-11-06 DIAGNOSIS — R20.2 NUMBNESS AND TINGLING OF LEFT SIDE OF FACE: ICD-10-CM

## 2024-11-06 PROCEDURE — 70543 MRI ORBT/FAC/NCK W/O &W/DYE: CPT

## 2024-11-06 PROCEDURE — A9579 GAD-BASE MR CONTRAST NOS,1ML: HCPCS | Performed by: RADIOLOGY

## 2024-11-06 PROCEDURE — 6360000004 HC RX CONTRAST MEDICATION: Performed by: RADIOLOGY

## 2024-11-06 RX ADMIN — GADOTERIDOL 20 ML: 279.3 INJECTION, SOLUTION INTRAVENOUS at 16:25

## 2024-12-02 ENCOUNTER — HOSPITAL ENCOUNTER (OUTPATIENT)
Facility: HOSPITAL | Age: 69
Discharge: HOME | End: 2024-12-05
Payer: COMMERCIAL

## 2024-12-02 ENCOUNTER — TRANSCRIBE ORDERS (OUTPATIENT)
Facility: HOSPITAL | Age: 69
End: 2024-12-02

## 2024-12-02 DIAGNOSIS — S82.831A CLOSED FRACTURE OF DISTAL END OF RIGHT FIBULA, UNSPECIFIED FRACTURE MORPHOLOGY, INITIAL ENCOUNTER: Primary | ICD-10-CM

## 2024-12-02 DIAGNOSIS — S82.831A CLOSED FRACTURE OF DISTAL END OF RIGHT FIBULA, UNSPECIFIED FRACTURE MORPHOLOGY, INITIAL ENCOUNTER: ICD-10-CM

## 2024-12-02 PROCEDURE — 73700 CT LOWER EXTREMITY W/O DYE: CPT

## 2025-01-20 ENCOUNTER — HOSPITAL ENCOUNTER (OUTPATIENT)
Facility: HOSPITAL | Age: 70
Discharge: HOME | End: 2025-01-23
Payer: COMMERCIAL

## 2025-01-20 VITALS
OXYGEN SATURATION: 94 % | DIASTOLIC BLOOD PRESSURE: 79 MMHG | WEIGHT: 236.8 LBS | HEIGHT: 69 IN | RESPIRATION RATE: 18 BRPM | BODY MASS INDEX: 35.07 KG/M2 | HEART RATE: 87 BPM | TEMPERATURE: 97.1 F | SYSTOLIC BLOOD PRESSURE: 138 MMHG

## 2025-01-20 LAB
ANION GAP SERPL CALC-SCNC: 6 MMOL/L (ref 2–12)
BUN SERPL-MCNC: 14 MG/DL (ref 6–20)
BUN/CREAT SERPL: 21 (ref 12–20)
CALCIUM SERPL-MCNC: 9.6 MG/DL (ref 8.5–10.1)
CHLORIDE SERPL-SCNC: 104 MMOL/L (ref 97–108)
CO2 SERPL-SCNC: 32 MMOL/L (ref 21–32)
CREAT SERPL-MCNC: 0.68 MG/DL (ref 0.55–1.02)
GLUCOSE SERPL-MCNC: 103 MG/DL (ref 65–100)
POTASSIUM SERPL-SCNC: 3.8 MMOL/L (ref 3.5–5.1)
SODIUM SERPL-SCNC: 142 MMOL/L (ref 136–145)

## 2025-01-20 PROCEDURE — 36415 COLL VENOUS BLD VENIPUNCTURE: CPT

## 2025-01-20 PROCEDURE — 93005 ELECTROCARDIOGRAM TRACING: CPT | Performed by: SURGERY

## 2025-01-20 PROCEDURE — 80048 BASIC METABOLIC PNL TOTAL CA: CPT

## 2025-01-20 RX ORDER — CALCIUM CARBONATE 260MG(650)
TABLET,CHEWABLE ORAL 2 TIMES DAILY
COMMUNITY

## 2025-01-20 RX ORDER — PHENOL 1.4 %
1 AEROSOL, SPRAY (ML) MUCOUS MEMBRANE EVERY MORNING
COMMUNITY

## 2025-01-20 NOTE — DISCHARGE INSTRUCTIONS
prior to surgery.  Do not shave or trim anywhere 24 hours before surgery  Wear your hair loose or down; no pony-tails, buns, or metal hair clips  Wear loose, comfortable, clean clothes  Wear glasses instead of contacts  Leave money, valuables, and jewelry, including body piercings, at home       Going Home - or Spending the Night SAME-DAY SURGERY: You must have a responsible adult drive you home and stay with you 24 hours after surgery  ADMITS: If your doctor is keeping you in the hospital after surgery, leave personal belongings/luggage in your car until you have a hospital room number.       Hospital discharge time is 12 noon     Drivers must be here before 12 noon unless you are told differently   Special Instructions           Follow all instructions so your surgery won’t be cancelled.  Please, be on time.                    If a situation occurs and you are delayed the day of surgery, call [phone]     If your physical condition changes (like a fever, cold, flu, etc.) call your surgeon.    Home medication(s) reviewed and verified verbally with list during PAT appointment.

## 2025-01-21 LAB
EKG ATRIAL RATE: 69 BPM
EKG DIAGNOSIS: NORMAL
EKG P AXIS: 33 DEGREES
EKG P-R INTERVAL: 180 MS
EKG Q-T INTERVAL: 434 MS
EKG QRS DURATION: 108 MS
EKG QTC CALCULATION (BAZETT): 465 MS
EKG R AXIS: -37 DEGREES
EKG T AXIS: 22 DEGREES
EKG VENTRICULAR RATE: 69 BPM

## 2025-01-21 PROCEDURE — 93010 ELECTROCARDIOGRAM REPORT: CPT | Performed by: SPECIALIST

## 2025-01-23 NOTE — PERIOP NOTE
Called to Dr. Chase office to request ASA plan. Informed them that we are 5 days out from surgery. ASA plan on MD desk. Should get to us by today

## 2025-01-24 NOTE — PERIOP NOTE
Spoke with Lindsey at [facility] - they do not prescribe the ASA so do not have recommendations for surgery related to ASA. Called to Dr. Kilgore office- ok to stay on ASA 81 mg. Called to patient and notified to stay on ASA - verbalized understanding.

## 2025-01-28 ENCOUNTER — ANESTHESIA EVENT (OUTPATIENT)
Facility: HOSPITAL | Age: 70
End: 2025-01-28
Payer: COMMERCIAL

## 2025-01-28 ENCOUNTER — HOSPITAL ENCOUNTER (OUTPATIENT)
Facility: HOSPITAL | Age: 70
Setting detail: OUTPATIENT SURGERY
Discharge: HOME | End: 2025-01-28
Attending: SURGERY | Admitting: SURGERY
Payer: COMMERCIAL

## 2025-01-28 ENCOUNTER — ANESTHESIA (OUTPATIENT)
Facility: HOSPITAL | Age: 70
End: 2025-01-28
Payer: COMMERCIAL

## 2025-01-28 VITALS
HEART RATE: 97 BPM | DIASTOLIC BLOOD PRESSURE: 74 MMHG | BODY MASS INDEX: 34.78 KG/M2 | RESPIRATION RATE: 21 BRPM | OXYGEN SATURATION: 95 % | TEMPERATURE: 98 F | SYSTOLIC BLOOD PRESSURE: 147 MMHG | WEIGHT: 237.22 LBS

## 2025-01-28 DIAGNOSIS — I89.0 LYMPHEDEMA OF ARM: Primary | ICD-10-CM

## 2025-01-28 PROCEDURE — 3600000012 HC SURGERY LEVEL 2 ADDTL 15MIN: Performed by: SURGERY

## 2025-01-28 PROCEDURE — 7100000011 HC PHASE II RECOVERY - ADDTL 15 MIN: Performed by: SURGERY

## 2025-01-28 PROCEDURE — 2580000003 HC RX 258: Performed by: ANESTHESIOLOGY

## 2025-01-28 PROCEDURE — 3600000002 HC SURGERY LEVEL 2 BASE: Performed by: SURGERY

## 2025-01-28 PROCEDURE — 2500000003 HC RX 250 WO HCPCS: Performed by: SURGERY

## 2025-01-28 PROCEDURE — 6360000002 HC RX W HCPCS: Performed by: SURGERY

## 2025-01-28 PROCEDURE — 7100000001 HC PACU RECOVERY - ADDTL 15 MIN: Performed by: SURGERY

## 2025-01-28 PROCEDURE — 2500000003 HC RX 250 WO HCPCS: Performed by: ANESTHESIOLOGY

## 2025-01-28 PROCEDURE — 6360000002 HC RX W HCPCS: Performed by: ANESTHESIOLOGY

## 2025-01-28 PROCEDURE — 2709999900 HC NON-CHARGEABLE SUPPLY: Performed by: SURGERY

## 2025-01-28 PROCEDURE — 7100000000 HC PACU RECOVERY - FIRST 15 MIN: Performed by: SURGERY

## 2025-01-28 PROCEDURE — 3700000000 HC ANESTHESIA ATTENDED CARE: Performed by: SURGERY

## 2025-01-28 PROCEDURE — 3700000001 HC ADD 15 MINUTES (ANESTHESIA): Performed by: SURGERY

## 2025-01-28 PROCEDURE — 7100000010 HC PHASE II RECOVERY - FIRST 15 MIN: Performed by: SURGERY

## 2025-01-28 PROCEDURE — 2720000010 HC SURG SUPPLY STERILE: Performed by: SURGERY

## 2025-01-28 PROCEDURE — 88304 TISSUE EXAM BY PATHOLOGIST: CPT

## 2025-01-28 PROCEDURE — 2580000003 HC RX 258: Performed by: SURGERY

## 2025-01-28 RX ORDER — PROPOFOL 10 MG/ML
INJECTION, EMULSION INTRAVENOUS
Status: DISCONTINUED | OUTPATIENT
Start: 2025-01-28 | End: 2025-01-29 | Stop reason: SDUPTHER

## 2025-01-28 RX ORDER — LIDOCAINE HYDROCHLORIDE 10 MG/ML
1 INJECTION, SOLUTION EPIDURAL; INFILTRATION; INTRACAUDAL; PERINEURAL
Status: DISCONTINUED | OUTPATIENT
Start: 2025-01-28 | End: 2025-01-28 | Stop reason: HOSPADM

## 2025-01-28 RX ORDER — DEXMEDETOMIDINE HYDROCHLORIDE 100 UG/ML
INJECTION, SOLUTION INTRAVENOUS
Status: DISCONTINUED | OUTPATIENT
Start: 2025-01-28 | End: 2025-01-29 | Stop reason: SDUPTHER

## 2025-01-28 RX ORDER — MIDAZOLAM HYDROCHLORIDE 1 MG/ML
INJECTION, SOLUTION INTRAMUSCULAR; INTRAVENOUS
Status: DISCONTINUED | OUTPATIENT
Start: 2025-01-28 | End: 2025-01-29 | Stop reason: SDUPTHER

## 2025-01-28 RX ORDER — FENTANYL CITRATE 50 UG/ML
100 INJECTION, SOLUTION INTRAMUSCULAR; INTRAVENOUS
Status: DISCONTINUED | OUTPATIENT
Start: 2025-01-28 | End: 2025-01-28 | Stop reason: HOSPADM

## 2025-01-28 RX ORDER — SODIUM CHLORIDE 9 MG/ML
INJECTION, SOLUTION INTRAVENOUS CONTINUOUS
Status: DISCONTINUED | OUTPATIENT
Start: 2025-01-28 | End: 2025-01-28 | Stop reason: HOSPADM

## 2025-01-28 RX ORDER — DEXAMETHASONE SODIUM PHOSPHATE 4 MG/ML
INJECTION, SOLUTION INTRA-ARTICULAR; INTRALESIONAL; INTRAMUSCULAR; INTRAVENOUS; SOFT TISSUE
Status: DISCONTINUED | OUTPATIENT
Start: 2025-01-28 | End: 2025-01-29 | Stop reason: SDUPTHER

## 2025-01-28 RX ORDER — CEFAZOLIN SODIUM 1 G/3ML
INJECTION, POWDER, FOR SOLUTION INTRAMUSCULAR; INTRAVENOUS
Status: DISCONTINUED | OUTPATIENT
Start: 2025-01-28 | End: 2025-01-29 | Stop reason: SDUPTHER

## 2025-01-28 RX ORDER — FENTANYL CITRATE 50 UG/ML
INJECTION, SOLUTION INTRAMUSCULAR; INTRAVENOUS
Status: DISCONTINUED | OUTPATIENT
Start: 2025-01-28 | End: 2025-01-29 | Stop reason: SDUPTHER

## 2025-01-28 RX ORDER — DIPHENHYDRAMINE HYDROCHLORIDE 50 MG/ML
12.5 INJECTION INTRAMUSCULAR; INTRAVENOUS
Status: DISCONTINUED | OUTPATIENT
Start: 2025-01-28 | End: 2025-01-28 | Stop reason: HOSPADM

## 2025-01-28 RX ORDER — EPHEDRINE SULFATE/0.9% NACL/PF 25 MG/5 ML
SYRINGE (ML) INTRAVENOUS
Status: DISCONTINUED | OUTPATIENT
Start: 2025-01-28 | End: 2025-01-29 | Stop reason: SDUPTHER

## 2025-01-28 RX ORDER — SODIUM CHLORIDE, SODIUM LACTATE, POTASSIUM CHLORIDE, CALCIUM CHLORIDE 600; 310; 30; 20 MG/100ML; MG/100ML; MG/100ML; MG/100ML
INJECTION, SOLUTION INTRAVENOUS CONTINUOUS
Status: DISCONTINUED | OUTPATIENT
Start: 2025-01-28 | End: 2025-01-28 | Stop reason: HOSPADM

## 2025-01-28 RX ORDER — NALOXONE HYDROCHLORIDE 0.4 MG/ML
INJECTION, SOLUTION INTRAMUSCULAR; INTRAVENOUS; SUBCUTANEOUS PRN
Status: DISCONTINUED | OUTPATIENT
Start: 2025-01-28 | End: 2025-01-28 | Stop reason: HOSPADM

## 2025-01-28 RX ORDER — KETOROLAC TROMETHAMINE 30 MG/ML
INJECTION, SOLUTION INTRAMUSCULAR; INTRAVENOUS
Status: DISCONTINUED | OUTPATIENT
Start: 2025-01-28 | End: 2025-01-29 | Stop reason: SDUPTHER

## 2025-01-28 RX ORDER — HYDROCODONE BITARTRATE AND ACETAMINOPHEN 5; 325 MG/1; MG/1
1 TABLET ORAL EVERY 6 HOURS PRN
Qty: 12 TABLET | Refills: 0 | Status: SHIPPED | OUTPATIENT
Start: 2025-01-28 | End: 2025-01-31

## 2025-01-28 RX ORDER — ONDANSETRON 2 MG/ML
4 INJECTION INTRAMUSCULAR; INTRAVENOUS
Status: DISCONTINUED | OUTPATIENT
Start: 2025-01-28 | End: 2025-01-28 | Stop reason: HOSPADM

## 2025-01-28 RX ORDER — MIDAZOLAM HYDROCHLORIDE 2 MG/2ML
2 INJECTION, SOLUTION INTRAMUSCULAR; INTRAVENOUS
Status: DISCONTINUED | OUTPATIENT
Start: 2025-01-28 | End: 2025-01-28 | Stop reason: HOSPADM

## 2025-01-28 RX ORDER — PHENYLEPHRINE HCL IN 0.9% NACL 0.4MG/10ML
SYRINGE (ML) INTRAVENOUS
Status: DISCONTINUED | OUTPATIENT
Start: 2025-01-28 | End: 2025-01-29 | Stop reason: SDUPTHER

## 2025-01-28 RX ORDER — ONDANSETRON 2 MG/ML
INJECTION INTRAMUSCULAR; INTRAVENOUS
Status: DISCONTINUED | OUTPATIENT
Start: 2025-01-28 | End: 2025-01-29 | Stop reason: SDUPTHER

## 2025-01-28 RX ADMIN — DEXAMETHASONE SODIUM PHOSPHATE 4 MG: 4 INJECTION, SOLUTION INTRAMUSCULAR; INTRAVENOUS at 10:19

## 2025-01-28 RX ADMIN — Medication 40 MCG: at 10:53

## 2025-01-28 RX ADMIN — PROPOFOL 50 MG: 10 INJECTION, EMULSION INTRAVENOUS at 14:12

## 2025-01-28 RX ADMIN — WATER 2000 MG: 1 INJECTION INTRAMUSCULAR; INTRAVENOUS; SUBCUTANEOUS at 10:22

## 2025-01-28 RX ADMIN — DEXMEDETOMIDINE 4 MCG: 100 INJECTION, SOLUTION INTRAVENOUS at 14:34

## 2025-01-28 RX ADMIN — Medication 80 MCG: at 10:34

## 2025-01-28 RX ADMIN — PROPOFOL 40 MG: 10 INJECTION, EMULSION INTRAVENOUS at 11:49

## 2025-01-28 RX ADMIN — WATER 2000 MG: 1 INJECTION INTRAMUSCULAR; INTRAVENOUS; SUBCUTANEOUS at 14:30

## 2025-01-28 RX ADMIN — EPHEDRINE SULFATE 5 MG: 5 INJECTION INTRAVENOUS at 10:29

## 2025-01-28 RX ADMIN — EPHEDRINE SULFATE 10 MG: 5 INJECTION INTRAVENOUS at 10:24

## 2025-01-28 RX ADMIN — SODIUM CHLORIDE: 9 INJECTION, SOLUTION INTRAVENOUS at 11:01

## 2025-01-28 RX ADMIN — PROPOFOL 30 MG: 10 INJECTION, EMULSION INTRAVENOUS at 10:50

## 2025-01-28 RX ADMIN — FENTANYL CITRATE 25 MCG: 50 INJECTION, SOLUTION INTRAMUSCULAR; INTRAVENOUS at 11:11

## 2025-01-28 RX ADMIN — DEXMEDETOMIDINE 2 MCG: 100 INJECTION, SOLUTION INTRAVENOUS at 10:18

## 2025-01-28 RX ADMIN — Medication 120 MCG: at 10:41

## 2025-01-28 RX ADMIN — PHENYLEPHRINE HYDROCHLORIDE 20 MCG/MIN: 10 INJECTION INTRAVENOUS at 11:00

## 2025-01-28 RX ADMIN — SODIUM CHLORIDE: 9 INJECTION, SOLUTION INTRAVENOUS at 14:26

## 2025-01-28 RX ADMIN — DEXMEDETOMIDINE 4 MCG: 100 INJECTION, SOLUTION INTRAVENOUS at 11:12

## 2025-01-28 RX ADMIN — FENTANYL CITRATE 25 MCG: 50 INJECTION, SOLUTION INTRAMUSCULAR; INTRAVENOUS at 11:46

## 2025-01-28 RX ADMIN — KETOROLAC TROMETHAMINE 30 MG: 30 INJECTION, SOLUTION INTRAMUSCULAR at 12:36

## 2025-01-28 RX ADMIN — FENTANYL CITRATE 50 MCG: 50 INJECTION, SOLUTION INTRAMUSCULAR; INTRAVENOUS at 14:59

## 2025-01-28 RX ADMIN — ONDANSETRON HYDROCHLORIDE 4 MG: 2 SOLUTION INTRAMUSCULAR; INTRAVENOUS at 12:14

## 2025-01-28 RX ADMIN — FENTANYL CITRATE 25 MCG: 50 INJECTION, SOLUTION INTRAMUSCULAR; INTRAVENOUS at 10:11

## 2025-01-28 RX ADMIN — CEFAZOLIN 2 G: 1 INJECTION, POWDER, FOR SOLUTION INTRAMUSCULAR; INTRAVENOUS at 14:32

## 2025-01-28 RX ADMIN — PROPOFOL 50 MG: 10 INJECTION, EMULSION INTRAVENOUS at 14:42

## 2025-01-28 RX ADMIN — FENTANYL CITRATE 25 MCG: 50 INJECTION, SOLUTION INTRAMUSCULAR; INTRAVENOUS at 13:49

## 2025-01-28 RX ADMIN — SODIUM CHLORIDE: 9 INJECTION, SOLUTION INTRAVENOUS at 09:56

## 2025-01-28 RX ADMIN — DEXMEDETOMIDINE 4 MCG: 100 INJECTION, SOLUTION INTRAVENOUS at 11:34

## 2025-01-28 RX ADMIN — PROPOFOL 100 MG: 10 INJECTION, EMULSION INTRAVENOUS at 10:11

## 2025-01-28 RX ADMIN — EPHEDRINE SULFATE 10 MG: 5 INJECTION INTRAVENOUS at 10:27

## 2025-01-28 RX ADMIN — PROPOFOL 100 MCG/KG/MIN: 10 INJECTION, EMULSION INTRAVENOUS at 10:13

## 2025-01-28 RX ADMIN — DEXMEDETOMIDINE 4 MCG: 100 INJECTION, SOLUTION INTRAVENOUS at 14:12

## 2025-01-28 RX ADMIN — FENTANYL CITRATE 25 MCG: 50 INJECTION, SOLUTION INTRAMUSCULAR; INTRAVENOUS at 13:09

## 2025-01-28 RX ADMIN — MIDAZOLAM HYDROCHLORIDE 2 MG: 1 INJECTION, SOLUTION INTRAMUSCULAR; INTRAVENOUS at 10:07

## 2025-01-28 RX ADMIN — FENTANYL CITRATE 25 MCG: 50 INJECTION, SOLUTION INTRAMUSCULAR; INTRAVENOUS at 10:07

## 2025-01-28 RX ADMIN — DEXMEDETOMIDINE 2 MCG: 100 INJECTION, SOLUTION INTRAVENOUS at 13:49

## 2025-01-28 RX ADMIN — Medication 40 MCG: at 10:44

## 2025-01-28 ASSESSMENT — PAIN DESCRIPTION - DESCRIPTORS: DESCRIPTORS: ACHING

## 2025-01-28 ASSESSMENT — PAIN - FUNCTIONAL ASSESSMENT: PAIN_FUNCTIONAL_ASSESSMENT: 0-10

## 2025-01-28 ASSESSMENT — PAIN SCALES - GENERAL
PAINLEVEL_OUTOF10: 0

## 2025-01-28 NOTE — DISCHARGE SUMMARY
Discharge Summary    Patient: Marii Nielsen               Sex: female          DOA: 1/28/2025  8:06 AM       YOB: 1955      Age:  69 y.o.        LOS:  LOS: 0 days                Discharge Date:      Admission Diagnoses: Lipoma of left shoulder [D17.22]  Lipoma of right upper extremity [D17.21]    Discharge Diagnoses:  Same    Procedure:  Procedure(s):  EXCISION OF NECK LIPOMA, BILATERAL UPPER EXTREMITY, AND BILATERAL GROIN LIPOMAS    Discharge Condition: Good    Hospital Course: Unremarkable operative procedure.  Discharge to home in stable condition.      Consults: None    Significant Diagnostic Studies: See full electronic record.     Discharge Medications:     Current Discharge Medication List        START taking these medications    Details   HYDROcodone-acetaminophen (NORCO) 5-325 MG per tablet Take 1 tablet by mouth every 6 hours as needed for Pain for up to 3 days. Intended supply: 3 days. Take lowest dose possible to manage pain Max Daily Amount: 4 tablets  Qty: 12 tablet, Refills: 0    Comments: Reduce doses taken as pain becomes manageable  Associated Diagnoses: Lymphedema of arm           CONTINUE these medications which have NOT CHANGED    Details   Magnesium Citrate 100 MG TABS Take by mouth in the morning and at bedtime      calcium carbonate 600 MG TABS tablet Take 1 tablet by mouth every morning      potassium bicarb-citric acid (EFFER-K) 20 MEQ TBEF effervescent tablet Dissolve 1 Tablet and take by mouth daily as directed  Qty: 90 tablet, Refills: 3      atorvastatin (LIPITOR) 40 MG tablet TAKE 1 TABLET BY MOUTH EVERY DAY  Qty: 90 tablet, Refills: 3    Associated Diagnoses: Hyperlipidemia, unspecified      lisinopril (PRINIVIL;ZESTRIL) 5 MG tablet Take 1 tablet by mouth daily  Qty: 90 tablet, Refills: 3    Associated Diagnoses: Benign hypertension      hydroCHLOROthiazide 12.5 MG tablet Take 1 tablet by mouth every morning  Qty: 90 tablet, Refills: 3    Associated Diagnoses: Benign

## 2025-01-28 NOTE — DISCHARGE INSTRUCTIONS
OK to take arm wraps off in 5 days    GENERAL POST-OPERATIVE  PATIENT INSTRUCTIONS      FOLLOW-UP:  Please make an appointment with your physician in 2 week(s).  Call your physician immediately if you have any fevers greater than 102.5, drainage from you wound that is not clear or looks infected, persistent bleeding, increasing abdominal pain, problems urinating, or persistent nausea/vomiting.      WOUND CARE INSTRUCTIONS:  Keep a dry clean dressing on the wound if there is drainage. The initial bandage may be removed after 24 hours.  Once the wound has quit draining you may leave it open to air.  If clothing rubs against the wound or causes irritation and the wound is not draining you may cover it with a dry dressing during the daytime.  Try to keep the wound dry and avoid ointments on the wound unless directed to do so.  If the wound becomes bright red and painful or starts to drain infected material that is not clear, please contact your physician immediately.  If the wound is mildly pink and has a thick firm ridge underneath it, this is normal, and is referred to as a healing ridge.  This will resolve over the next 4-6 weeks.    DIET:  You may eat any foods that you can tolerate.  It is a good idea to eat a high fiber diet and take in plenty of fluids to prevent constipation.  If you do become constipated you may want to take a mild laxative or take ducolax tablets on a daily basis until your bowel habits are regular.  Constipation can be very uncomfortable, along with straining, after recent surgery.    ACTIVITY:  You are encouraged to cough and deep breath or use your incentive spirometer if you were given one, every 15-30 minutes when awake.  This will help prevent respiratory complications and low grade fevers post-operatively if you had a general anesthetic.  You may want to hug a pillow when coughing and sneezing to add additional support to the surgical area, if you had abdominal or chest surgery, which

## 2025-01-28 NOTE — BRIEF OP NOTE
Brief Postoperative Note      Patient: Marii Nielsen  YOB: 1955  MRN: [id number]    Date of Procedure: 1/28/2025    Pre-Op Diagnosis Codes:      * Lipoma of left shoulder [D17.22]     * Lipoma of right upper extremity [D17.21]    Post-Op Diagnosis: Same       Procedure(s):  EXCISION BILATERAL UPPER EXTREMITY, AND BILATERAL GROIN LIPOMAS  EXCISION NECK CYST    Surgeon(s):  Radhames Kilgore MD    Assistant:  Surgical Assistant: Joaquin Mayer    Anesthesia: General    Estimated Blood Loss (mL): less than 50     Complications: None    Specimens:   ID Type Source Tests Collected by Time Destination   1 : TRUNK LIPOMAS. Tissue Abdomen SURGICAL PATHOLOGY Radhames Kilgore MD 1/28/2025 1125    2 : RIGHT ARM LIPOMAS. Tissue Arm SURGICAL PATHOLOGY Radhames Kilgore MD 1/28/2025 1230    3 : LEFT ARM LIPOMAS. Tissue Arm SURGICAL PATHOLOGY Radhames Kilgore MD 1/28/2025 1346    4 : LEFT UPPER BACK SEBACEOUS CYST. Tissue Back SURGICAL PATHOLOGY Radhames Kilgore MD 1/28/2025 1330        Implants:  * No implants in log *      Drains: * No LDAs found *    Findings:  Infection Present At Time Of Surgery (PATOS) (choose all levels that have infection present):  No infection present  Other Findings: Multiple lipomas, total 20 cm aggregate, 3 cm back cyst  This procedure was not performed to treat primary cutaneous melanoma through wide local excision    Electronically signed by Radhames Kilgore MD on 1/28/2025 at 2:44 PM    [id number]

## 2025-01-28 NOTE — H&P
Assessment:     Multiple lipomas    Plan:     Excision    Signed By: Radhames Kilgore MD  [facility]  Office:  [phone]  Fax:  [phone]             General Surgery History and Physical    Subjective:      Marii Nielsen is a 69 y.o.  female who presents with above.     Past Medical History:   Diagnosis Date    Arthritis     Frequent UTI     HTN (hypertension)     Seizure (HCC)     ; can tell when she is going to have one    Subdural hematoma     resolved: related to surgery    TIA (transient ischemic attack)     \"multiple\"     Past Surgical History:   Procedure Laterality Date    CHOLECYSTECTOMY      COLONOSCOPY  2014    DENTAL SURGERY      HYSTERECTOMY (CERVIX STATUS UNKNOWN)      JOINT REPLACEMENT Right 2023    bilateral hip arthroplasty    KNEE SURGERY Right 2024    ORIF TIBIA & FIBULA FRACTURES      ORTHOPEDIC SURGERY      knee left and right    OTHER SURGICAL HISTORY      electordes for seizures      Family History   Problem Relation Age of Onset    Cancer Father     Heart Disease Mother     Cancer Mother     Cancer Maternal Aunt     High Blood Pressure Brother      Social History     Socioeconomic History    Marital status:      Spouse name: None    Number of children: None    Years of education: None    Highest education level: None   Tobacco Use    Smoking status: Former     Current packs/day: 0.00     Average packs/day: 2.0 packs/day for 31.0 years (62.0 ttl pk-yrs)     Types: Cigarettes     Start date: 1981     Quit date: 2012     Years since quittin.0     Passive exposure: Past    Smokeless tobacco: Never    Tobacco comments:     stopped during pregnancies   Vaping Use    Vaping status: Never Used   Substance and Sexual Activity    Alcohol use: Yes     Alcohol/week: 2.0 standard drinks of alcohol     Types: 1 Glasses of wine, 1 Drinks containing 0.5 oz of alcohol per week     Comment: social    Drug use: Never    Sexual activity: Yes

## 2025-01-28 NOTE — ANESTHESIA PRE PROCEDURE
Department of Anesthesiology  Preprocedure Note       Name:  Marii Nielsen   Age:  69 y.o.  :  1955                                          MRN:  [id number]         Date:  2025      Surgeon: Surgeon(s):  Radhames Kilgore MD    Procedure: Procedure(s):  EXCISION OF NECK LIPOMA, BILATERAL UPPER EXTREMITY, AND BILATERAL GROIN LIPOMAS    Medications prior to admission:   Prior to Admission medications    Medication Sig Start Date End Date Taking? Authorizing Provider   Magnesium Citrate 100 MG TABS Take by mouth in the morning and at bedtime    Sergio Lopez MD   calcium carbonate 600 MG TABS tablet Take 1 tablet by mouth every morning    Sergio Lopez MD   potassium bicarb-citric acid (EFFER-K) 20 MEQ TBEF effervescent tablet Dissolve 1 Tablet and take by mouth daily as directed 10/18/24   Khadra Kerr MD   atorvastatin (LIPITOR) 40 MG tablet TAKE 1 TABLET BY MOUTH EVERY DAY  Patient taking differently: Take 1 tablet by mouth every morning 10/16/24   Khadra Kerr MD   lisinopril (PRINIVIL;ZESTRIL) 5 MG tablet Take 1 tablet by mouth daily  Patient taking differently: Take 2 tablets by mouth every morning 10/16/24   Khadra Kerr MD   hydroCHLOROthiazide 12.5 MG tablet Take 1 tablet by mouth every morning  Patient taking differently: Take 2 tablets by mouth every morning 24   Khadra Kerr MD   acetaminophen (TYLENOL) 500 MG tablet Take 2 tablets by mouth every 6 hours as needed for Pain    Sergio Lopez MD   ASPIRIN LOW DOSE 81 MG EC tablet Take 1 tablet by mouth every morning 24   Sergio Lopez MD   lamoTRIgine (LAMICTAL) 100 MG tablet Take 4 tablets by mouth at bedtime    Sergio Lopez MD   lacosamide (VIMPAT) 100 MG TABS tablet Take 1 tablet by mouth 2 times daily. 23   Sergio Lopez MD   senna (SENOKOT) 8.6 MG tablet Take 1 tablet by mouth 2 times daily    Sergio Lopez MD   pregabalin (LYRICA) 150 MG capsule Take 1 capsule by

## 2025-01-28 NOTE — ANESTHESIA POSTPROCEDURE EVALUATION
Department of Anesthesiology  Postprocedure Note    Patient: Marii Nielsen  MRN: [id number]  YOB: 1955  Date of evaluation: 1/28/2025    Procedure Summary       Date: 01/28/25 Room / Location: [facility] MAIN OR  / [facility] MAIN OR    Anesthesia Start: 1007 Anesthesia Stop:     Procedure: EXCISION OF NECK LIPOMA, BILATERAL UPPER EXTREMITY, AND BILATERAL GROIN LIPOMAS Diagnosis:       Lipoma of left shoulder      Lipoma of right upper extremity      (Lipoma of left shoulder [D17.22])      (Lipoma of right upper extremity [D17.21])    Surgeons: Radhames Kilgore MD Responsible Provider: Galileo Hennessy MD    Anesthesia Type: general ASA Status: 2            Anesthesia Type: No value filed.    Omar Phase I: Omar Score: 8    Omar Phase II:      Anesthesia Post Evaluation    Patient location during evaluation: PACU  Patient participation: complete - patient participated  Level of consciousness: awake and sleepy but conscious  Airway patency: patent  Nausea & Vomiting: no vomiting and no nausea  Cardiovascular status: hemodynamically stable  Respiratory status: acceptable  Hydration status: stable  Comments: Patient seen and examined.  Ready for discharge from PACU.  Pain management: adequate and satisfactory to patient    No notable events documented.

## 2025-01-29 NOTE — OP NOTE
[facility]          [address] [place]  [address]                            OPERATIVE REPORT      PATIENT NAME: LINDEN CALDERON                 : 1955  MED REC NO: [id number]                       ROOM: OR  ACCOUNT NO: [id number]                       ADMIT DATE: 2025  PROVIDER: Radhames Kilgore MD    DATE OF SERVICE:  2025    PREOPERATIVE DIAGNOSES:  Multiple painful lipomas.    POSTOPERATIVE DIAGNOSES:  Multiple painful lipomas with a left neck cyst.    PROCEDURES PERFORMED:       1. Excision of bilateral upper extremity and bilateral groin lipomas.     2. Excision of neck cyst.    SURGEON:  Radhames Kilgore MD    ASSISTANT:  Surgical assistant: Joaquin Mayer.    ANESTHESIA:  General.    ESTIMATED BLOOD LOSS:  Less than 50 mL.    SPECIMENS REMOVED:  Trunk lipomas, right arm lipoma, left arm lipoma, left back sebaceous cyst.    INTRAOPERATIVE FINDINGS:  Multiple lipomas of the upper and lower extremities, 3 cm back cyst, thought to be a lipoma from evaluation.     COMPLICATIONS:  None.    IMPLANTS:  ***    INDICATIONS:  The patient has a known history of multiple painful lipomas.  She was taken to the operating room for surgical management.    DESCRIPTION OF PROCEDURE:  Consent was obtained.  She was taken to the operating room and placed in supine position.  SCDs were turned on and working.  After successful induction of general LMA anesthetic, the abdomen and upper extremities were prepped in usual sterile fashion.  A time-out was performed per protocol.     *** for simplicity, each of the skin incisions of the lipoma was closed in this fashion.  The dermis was reapproximated with 3-0 Vicryl interrupted stitches.  Skin was closed with 4-0 Monocryl subcuticular stitch and surgical glue.  I now started with the bilateral abdominal wall.  I excised a 4 cm left upper abdomen lipoma from the capsular connections, I excised a 4    *** right 
                   [facility]          [address] [place]  [address]                            OPERATIVE REPORT      PATIENT NAME: LINDEN CALDERON                 : 1955  MED REC NO: [id number]                       ROOM: OR  ACCOUNT NO: [id number]                       ADMIT DATE: 2025  PROVIDER: Radhames Kilgore MD    DATE OF SERVICE:  2025    PREOPERATIVE DIAGNOSES:  Multiple painful lipomas.    POSTOPERATIVE DIAGNOSES:  Multiple painful lipomas with a left neck cyst.    PROCEDURES PERFORMED:       1. Excision of bilateral upper extremity and bilateral groin lipomas.     2. Excision of neck cyst.    SURGEON:  Radhames Kilgore MD    ASSISTANT:  Surgical assistant: Joaquin Mayer.    ANESTHESIA:  General.    ESTIMATED BLOOD LOSS:  Less than 50 mL.    SPECIMENS REMOVED:  Trunk lipomas, right arm lipoma, left arm lipoma, left back sebaceous cyst.    INTRAOPERATIVE FINDINGS:  Multiple lipomas of the upper and lower extremities, 3 cm back cyst, thought to be a lipoma from evaluation.     COMPLICATIONS:  None.    IMPLANTS:  ***    INDICATIONS:  The patient has a known history of multiple painful lipomas.  She was taken to the operating room for surgical management.    DESCRIPTION OF PROCEDURE:  Consent was obtained.  She was taken to the operating room and placed in supine position.  SCDs were turned on and working.  After successful induction of general LMA anesthetic, the abdomen and upper extremities were prepped in usual sterile fashion.  A time-out was performed per protocol.   *** for simplicity, each of the skin incisions of the lipoma was closed in this fashion.  The dermis was reapproximated with 3-0 Vicryl interrupted stitches.  Skin was closed with 4-0 Monocryl subcuticular stitch and surgical glue.  I now started with the bilateral abdominal wall.  I excised a 4 cm left upper abdomen lipoma from the capsular connections, I excised a 4  *** right upper 
                   [facility]        [address] [place]  [address]                        OPERATIVE REPORTPATIENT NAME: LINDEN CALDERON                 : 1955MED REC NO: [id number]                       ROOM: ORACCOUNT NO: [id number]                       ADMIT DATE: 2025PROVIDER: Radhames Kilgore, MDDATE OF SERVICE:  2025PREOPERATIVE DIAGNOSES:  Multiple painful lipomas.POSTOPERATIVE DIAGNOSES:  Multiple painful lipomas with a left neck cyst.PROCEDURES PERFORMED:     1. Excision of bilateral upper extremity and bilateral groin lipomas.   2. Excision of neck cyst.SURGEON:  LILY BarnesSSISTANT:  Surgical assistant: Joaquin Mayer.ANESTHESIA:  General.ESTIMATED BLOOD LOSS:  Less than 50 mL.SPECIMENS REMOVED:  Trunk lipomas, right arm lipoma, left arm lipoma, left back sebaceous cyst.INTRAOPERATIVE FINDINGS:  Multiple lipomas of the upper and lower extremities, 3 cm back cyst, thought to be a lipoma from evaluation. COMPLICATIONS:  None.IMPLANTS:  ***INDICATIONS:  The patient has a known history of multiple painful lipomas.  She was taken to the operating room for surgical management.DESCRIPTION OF PROCEDURE:  Consent was obtained.  She was taken to the operating room and placed in supine position.  SCDs were turned on and working.  After successful induction of general LMA anesthetic, the abdomen and upper extremities were prepped in usual sterile fashion.  A time-out was performed per protocol.   *** for simplicity, each of the skin incisions of the lipoma was closed in this fashion.  The dermis was reapproximated with 3-0 Vicryl interrupted stitches.  Skin was closed with 4-0 Monocryl subcuticular stitch and surgical glue.  I now started with the bilateral abdominal wall.  I excised a 4 cm left upper abdomen lipoma from the capsular connections, I excised a 4  *** right upper abdomen lipoma from capsular connections, I excised a left groin 4 cm lipoma, 
                   [facility]        [address] [place]  [address]                        OPERATIVE REPORTPATIENT NAME: LINDEN CALDERON                 : 1955MED REC NO: [id number]                       ROOM: ORACCOUNT NO: [id number]                       ADMIT DATE: 2025PROVIDER: Radhames Kilgore, MDDATE OF SERVICE:  2025PREOPERATIVE DIAGNOSES:  Multiple painful lipomas.POSTOPERATIVE DIAGNOSES:  Multiple painful lipomas with a left neck cyst.PROCEDURES PERFORMED:     1. Excision of bilateral upper extremity and bilateral groin lipomas.   2. Excision of neck cyst.SURGEON:  LILY BarnesSSISTANT:  Surgical assistant: Joaquin Mayer.ANESTHESIA:  General.ESTIMATED BLOOD LOSS:  Less than 50 mL.SPECIMENS REMOVED:  Trunk lipomas, right arm lipoma, left arm lipoma, left back sebaceous cyst.INTRAOPERATIVE FINDINGS:  Multiple lipomas of the upper and lower extremities, 3 cm back cyst, thought to be a lipoma from evaluation. COMPLICATIONS:  None.IMPLANTS:  ***INDICATIONS:  The patient has a known history of multiple painful lipomas.  She was taken to the operating room for surgical management.DESCRIPTION OF PROCEDURE:  Consent was obtained.  She was taken to the operating room and placed in supine position.  SCDs were turned on and working.  After successful induction of general LMA anesthetic, the abdomen and upper extremities were prepped in usual sterile fashion.  A time-out was performed per protocol.     *** for simplicity, each of the skin incisions of the lipoma was closed in this fashion.  The dermis was reapproximated with 3-0 Vicryl interrupted stitches.  Skin was closed with 4-0 Monocryl subcuticular stitch and surgical glue.  I now started with the bilateral abdominal wall.  I excised a 4 cm left upper abdomen lipoma from the capsular connections, I excised a 4    *** right upper abdomen lipoma from capsular connections, I excised a left groin 4 cm 
                   [facility]          [address] [place]  [address]                            OPERATIVE REPORT      PATIENT NAME: LINDEN CALDERON                 : 1955  MED REC NO: [id number]                       ROOM: OR  ACCOUNT NO: [id number]                       ADMIT DATE: 2025  PROVIDER: Radhames Kilgore MD    DATE OF SERVICE:  2025    PREOPERATIVE DIAGNOSES:    MULTIPLE PAINFUL LIPOMAS.:      POSTOPERATIVE DIAGNOSES:  Multiple painful lipomas with a left neck cyst.    PROCEDURES PERFORMED:       1. Excision of bilateral upper extremity and bilateral groin lipomas.     2. Excision of neck cyst.     3.    SURGEON:  Radhames Kilgore MD    ASSISTANT:  Surgical assistant: Joaquin Mayer.    ANESTHESIA:  General.    ESTIMATED BLOOD LOSS:  Less than 50 mL.    SPECIMENS REMOVED:  Trunk lipomas, right arm lipoma, left arm lipoma, left back sebaceous cyst.    INTRAOPERATIVE FINDINGS:  Multiple lipomas of the upper and lower extremities, 3 cm back cyst, thought to be a lipoma from evaluation.     COMPLICATIONS:  None.    IMPLANTS:  ***    INDICATIONS:  The patient has a known history of multiple painful lipomas.  She was taken to the operating room for surgical management.    DESCRIPTION OF PROCEDURE:  Consent was obtained.  She was taken to the operating room and placed in supine position.  SCDs were turned on and working.  After successful induction of general LMA anesthetic, the abdomen and upper extremities were prepped in usual sterile fashion.  A time-out was performed per protocol.    *** for simplicity, each of the skin incisions of the lipoma was closed in this fashion.  The dermis was reapproximated with 3-0 Vicryl interrupted stitches.  Skin was closed with 4-0 Monocryl subcuticular stitch and surgical glue.  I now started with the bilateral abdominal wall.  I excised a 4 cm left upper abdomen lipoma from the capsular connections, I excised a 4   
                   [facility]        [address] [place]  [address]                        OPERATIVE REPORTPATIENT NAME: LINDEN CALDERON                 : 1955MED REC NO: [id number]                       ROOM: ORACCOUNT NO: [id number]                       ADMIT DATE: 2025PROVIDER: Radhames Kilgore, MDDATE OF SERVICE:  2025PREOPERATIVE DIAGNOSES:  MULTIPLE PAINFUL LIPOMAS.:  POSTOPERATIVE DIAGNOSES:  Multiple painful lipomas with a left neck cyst.PROCEDURES PERFORMED:     1. Excision of bilateral upper extremity and bilateral groin lipomas.   2. Excision of neck cyst.   3.SURGEON:  MIRTA BarnesISTANT:  Surgical assistant: Joaquin Mayer.ANESTHESIA:  General.ESTIMATED BLOOD LOSS:  Less than 50 mL.SPECIMENS REMOVED:  Trunk lipomas, right arm lipoma, left arm lipoma, left back sebaceous cyst.INTRAOPERATIVE FINDINGS:  Multiple lipomas of the upper and lower extremities, 3 cm back cyst, thought to be a lipoma from evaluation. COMPLICATIONS:  None.IMPLANTS:  ***INDICATIONS:  The patient has a known history of multiple painful lipomas.  She was taken to the operating room for surgical management.DESCRIPTION OF PROCEDURE:  Consent was obtained.  She was taken to the operating room and placed in supine position.  SCDs were turned on and working.  After successful induction of general LMA anesthetic, the abdomen and upper extremities were prepped in usual sterile fashion.  A time-out was performed per protocol.    *** for simplicity, each of the skin incisions of the lipoma was closed in this fashion.  The dermis was reapproximated with 3-0 Vicryl interrupted stitches.  Skin was closed with 4-0 Monocryl subcuticular stitch and surgical glue.  I now started with the bilateral abdominal wall.  I excised a 4 cm left upper abdomen lipoma from the capsular connections, I excised a 4   *** right upper abdomen lipoma from capsular connections, I excised a left groin 4 
                   [facility]        [address] [place]  [address]                        OPERATIVE REPORTPATIENT NAME: LINDEN CALDERON                 : 1955MED REC NO: [id number]                       ROOM: ORACCOUNT NO: [id number]                       ADMIT DATE: 2025PROVIDER: Radhames Kilgore, MDDATE OF SERVICE:  2025PREOPERATIVE DIAGNOSES:  Multiple painful lipomas.POSTOPERATIVE DIAGNOSES:  Multiple painful lipomas with a left neck cyst.PROCEDURES PERFORMED:     1. Excision of bilateral upper extremity and bilateral groin lipomas.   2. Excision of neck cyst.SURGEON:  LILY BarnesSSISTANT:  Surgical assistant: Joaquin Mayer.ANESTHESIA:  General.ESTIMATED BLOOD LOSS:  Less than 50 mL.SPECIMENS REMOVED:  Trunk lipomas, right arm lipoma, left arm lipoma, left back sebaceous cyst.INTRAOPERATIVE FINDINGS:  Multiple lipomas of the upper and lower extremities, 3 cm back cyst, thought to be a lipoma from evaluation. COMPLICATIONS:  None.IMPLANTS:  ***INDICATIONS:  The patient has a known history of multiple painful lipomas.  She was taken to the operating room for surgical management.DESCRIPTION OF PROCEDURE:  Consent was obtained.  She was taken to the operating room and placed in supine position.  SCDs were turned on and working.  After successful induction of general LMA anesthetic, the abdomen and upper extremities were prepped in usual sterile fashion.  A time-out was performed per protocol.   *** for simplicity, each of the skin incisions of the lipoma was closed in this fashion.  The dermis was reapproximated with 3-0 Vicryl interrupted stitches.  Skin was closed with 4-0 Monocryl subcuticular stitch and surgical glue.  I now started with the bilateral abdominal wall.  I excised a 4 cm left upper abdomen lipoma from the capsular connections, I excised a 4  *** right upper abdomen lipoma from capsular connections, I excised a left groin 4 cm lipoma, 
administered at the end.  Local was a mixture of 0.5 Marcaine plain, 30 mL mixed with 150 mL of saline mixed   with 20 mL of Exparel and distributed evenly among all skin incisions.  The right and left forearms were wrapped in a compression dressing for reduction of seroma postoperatively.  She tolerated the procedure well.BRET ESCOBAR/LELIA:  01/28/2025 14:53:48T:  01/28/2025 22:26:27JOB #:  [id number]

## 2025-06-20 DIAGNOSIS — I10 BENIGN HYPERTENSION: ICD-10-CM

## 2025-06-20 NOTE — TELEPHONE ENCOUNTER
Medication Refill Request    Marii Nielsen is requesting a refill of the following medication(s):   Requested Prescriptions     Pending Prescriptions Disp Refills    hydroCHLOROthiazide 12.5 MG tablet [Pharmacy Med Name: HYDROCHLOROTHIAZIDE 12.5 MG TB] 90 tablet 3     Sig: TAKE 1 TABLET BY MOUTH EVERY DAY IN THE MORNING        Listed PCP is Khadra Kerr MD   Last provider to prescribe medication: Dr. Kerr on 8/28/24  Date of Last Office Visit at [facility]: 10/28/2024 with Dr. Kerr    FUTURE [facility] APPOINTMENT: Visit date not found    Please send refill to:    [id number] - [place], [place] - [address] [facility] - P [phone] - F [phone]  [address] [place] [address]  Phone: [phone] Fax: [phone]    [facility] #[id number] [place], [place] - [address] - P [phone] - F [phone]  [address] [facility] [address]  Phone: [phone] Fax: [phone]      Please review request and approve or deny with recommendations within 48 hours.

## 2025-06-22 RX ORDER — HYDROCHLOROTHIAZIDE 12.5 MG/1
25 TABLET ORAL EVERY MORNING
Qty: 180 TABLET | Refills: 3 | Status: SHIPPED | OUTPATIENT
Start: 2025-06-22

## 2025-07-04 DIAGNOSIS — K59.00 CONSTIPATION, UNSPECIFIED CONSTIPATION TYPE: Primary | ICD-10-CM

## 2025-07-04 DIAGNOSIS — Z12.11 COLON CANCER SCREENING: ICD-10-CM

## 2025-07-04 NOTE — PROGRESS NOTES
[facility]      Received patient message that patient would like to see GI Dr. Aguilar [facility] [place] for colonoscopy & constipation but office requires a referral. Referral placed.     Khadra Kerr MD

## 2025-07-18 DIAGNOSIS — I10 BENIGN HYPERTENSION: ICD-10-CM

## 2025-07-20 RX ORDER — LISINOPRIL 5 MG/1
5 TABLET ORAL DAILY
Qty: 90 TABLET | Refills: 0 | Status: SHIPPED | OUTPATIENT
Start: 2025-07-20

## 2025-08-21 DIAGNOSIS — R20.2 NUMBNESS AND TINGLING OF LEFT SIDE OF FACE: ICD-10-CM

## 2025-08-21 DIAGNOSIS — R56.9 SEIZURES (HCC): Primary | ICD-10-CM

## 2025-08-21 DIAGNOSIS — G89.29 OTHER CHRONIC PAIN: ICD-10-CM

## 2025-08-21 DIAGNOSIS — R20.0 NUMBNESS AND TINGLING OF LEFT SIDE OF FACE: ICD-10-CM

## (undated) DEVICE — SOLUTION IRRIG 500ML 0.9% SOD CHLO USP POUR PLAS BTL

## (undated) DEVICE — TRANSFER SET 3": Brand: MEDLINE INDUSTRIES, INC.

## (undated) DEVICE — DISSECTOR ENDOSCP L21CM TIP CURVATURE 40DEG FN CRV JAW VES

## (undated) DEVICE — SPONGE GZ W4XL4IN COT 12 PLY TYP VII WVN C FLD DSGN STERILE

## (undated) DEVICE — DRAPE,REIN 53X77,STERILE: Brand: MEDLINE

## (undated) DEVICE — LIQUIBAND RAPID ADHESIVE 36/CS 0.8ML: Brand: MEDLINE

## (undated) DEVICE — SOLUTION IRRIG 1000ML STRL H2O USP PLAS POUR BTL

## (undated) DEVICE — SUTURE MONOCRYL + SZ 4-0 L27IN ABSRB UD L19MM PS-2 3/8 CIR MCP426H

## (undated) DEVICE — BASIC GENERAL-SFMC: Brand: MEDLINE INDUSTRIES, INC.

## (undated) DEVICE — SHEET, DRAPE, SPLIT, STERILE: Brand: MEDLINE

## (undated) DEVICE — SUTURE VICRYL + SZ 2-0 L27IN ABSRB WHT SH 1/2 CIR TAPERCUT VCP417H

## (undated) DEVICE — CANISTER, RIGID, 3000CC: Brand: MEDLINE INDUSTRIES, INC.

## (undated) DEVICE — CELLERATERX® SURGICAL ACTIVATED COLLAGEN® POWDER IS TYPE I BOVINE HYDROLYZED COLLAGEN AND CONTAINS NO ADDITIVES.: Brand: CELLERATERX SURGICAL

## (undated) DEVICE — BLADE ES L2.75IN ELASTOMERIC COAT DURABLE BEND UPTO 90DEG

## (undated) DEVICE — STAPLER SKIN H3.9MM WIRE DIA0.58MM CRWN 6.9MM 35 STPL ROT

## (undated) DEVICE — TOWEL,OR,DSP,ST,BLUE,STD,4/PK,20PK/CS: Brand: MEDLINE

## (undated) DEVICE — Device

## (undated) DEVICE — BANDAGE,GAUZE,BULKEE II,4.5"X4.1YD,STRL: Brand: MEDLINE

## (undated) DEVICE — GLOVE ORTHO 8   MSG9480

## (undated) DEVICE — BANDAGE COMPR W6INXL10YD ST M E WHITE/BEIGE

## (undated) DEVICE — SPONGE GZ W4XL4IN COT RADPQ HIGHLY ABSRB STERILE

## (undated) DEVICE — SPONGE LAP W18XL18IN WHT COT 4 PLY FLD STRUNG RADPQ DISP ST 2 PER PACK

## (undated) DEVICE — SUTURE VICRYL + SZ 3-0 L27IN ABSRB UD L26MM SH 1/2 CIR VCP416H